# Patient Record
Sex: FEMALE | Race: WHITE | NOT HISPANIC OR LATINO | Employment: FULL TIME | URBAN - METROPOLITAN AREA
[De-identification: names, ages, dates, MRNs, and addresses within clinical notes are randomized per-mention and may not be internally consistent; named-entity substitution may affect disease eponyms.]

---

## 2017-07-14 ENCOUNTER — OFFICE VISIT - HEALTHEAST (OUTPATIENT)
Dept: MIDWIFE SERVICES | Facility: CLINIC | Age: 27
End: 2017-07-14

## 2017-07-14 DIAGNOSIS — Z30.431 IUD SURVEILLANCE: ICD-10-CM

## 2017-07-14 DIAGNOSIS — T83.32XA IUD STRINGS LOST: ICD-10-CM

## 2017-07-14 DIAGNOSIS — Z97.5 IUD (INTRAUTERINE DEVICE) IN PLACE: ICD-10-CM

## 2017-07-14 ASSESSMENT — MIFFLIN-ST. JEOR: SCORE: 1478.12

## 2017-12-17 ENCOUNTER — HEALTH MAINTENANCE LETTER (OUTPATIENT)
Age: 27
End: 2017-12-17

## 2018-05-21 ENCOUNTER — COMMUNICATION - HEALTHEAST (OUTPATIENT)
Dept: MIDWIFE SERVICES | Facility: CLINIC | Age: 28
End: 2018-05-21

## 2018-05-22 ENCOUNTER — AMBULATORY - HEALTHEAST (OUTPATIENT)
Dept: MIDWIFE SERVICES | Facility: CLINIC | Age: 28
End: 2018-05-22

## 2018-05-22 DIAGNOSIS — Z23 IMMUNIZATION DUE: ICD-10-CM

## 2018-06-12 ENCOUNTER — AMBULATORY - HEALTHEAST (OUTPATIENT)
Dept: INTERNAL MEDICINE | Facility: CLINIC | Age: 28
End: 2018-06-12

## 2018-06-13 ENCOUNTER — OFFICE VISIT - HEALTHEAST (OUTPATIENT)
Dept: INTERNAL MEDICINE | Facility: CLINIC | Age: 28
End: 2018-06-13

## 2018-06-13 DIAGNOSIS — Z11.1 SCREENING EXAMINATION FOR PULMONARY TUBERCULOSIS: ICD-10-CM

## 2018-06-13 DIAGNOSIS — Z00.00 ROUTINE GENERAL MEDICAL EXAMINATION AT A HEALTH CARE FACILITY: ICD-10-CM

## 2018-06-13 ASSESSMENT — MIFFLIN-ST. JEOR: SCORE: 1528.02

## 2018-06-15 LAB
QTF INTERPRETATION: NORMAL
QTF MITOGEN - NIL: 7.1 IU/ML
QTF NIL: 0.08 IU/ML
QTF RESULT: NEGATIVE
QTF TB ANTIGEN - NIL: 0.02 IU/ML

## 2018-07-05 ENCOUNTER — COMMUNICATION - HEALTHEAST (OUTPATIENT)
Dept: ADMINISTRATIVE | Facility: CLINIC | Age: 28
End: 2018-07-05

## 2018-07-18 ENCOUNTER — OFFICE VISIT - HEALTHEAST (OUTPATIENT)
Dept: INTERNAL MEDICINE | Facility: CLINIC | Age: 28
End: 2018-07-18

## 2018-07-18 DIAGNOSIS — F41.0 PANIC ATTACK: ICD-10-CM

## 2018-07-18 DIAGNOSIS — F43.23 ADJUSTMENT DISORDER WITH MIXED ANXIETY AND DEPRESSED MOOD: ICD-10-CM

## 2018-07-18 ASSESSMENT — MIFFLIN-ST. JEOR: SCORE: 1518.95

## 2018-08-08 ENCOUNTER — OFFICE VISIT - HEALTHEAST (OUTPATIENT)
Dept: INTERNAL MEDICINE | Facility: CLINIC | Age: 28
End: 2018-08-08

## 2018-08-08 ENCOUNTER — AMBULATORY - HEALTHEAST (OUTPATIENT)
Dept: INTERNAL MEDICINE | Facility: CLINIC | Age: 28
End: 2018-08-08

## 2018-08-08 ENCOUNTER — RECORDS - HEALTHEAST (OUTPATIENT)
Dept: GENERAL RADIOLOGY | Facility: CLINIC | Age: 28
End: 2018-08-08

## 2018-08-08 DIAGNOSIS — F41.9 ANXIETY: ICD-10-CM

## 2018-08-08 DIAGNOSIS — R07.89 CHEST WALL PAIN: ICD-10-CM

## 2018-08-08 DIAGNOSIS — R07.89 OTHER CHEST PAIN: ICD-10-CM

## 2018-08-08 DIAGNOSIS — F41.0 PANIC ATTACKS: ICD-10-CM

## 2018-08-08 ASSESSMENT — MIFFLIN-ST. JEOR: SCORE: 1518.95

## 2018-09-10 ENCOUNTER — OFFICE VISIT - HEALTHEAST (OUTPATIENT)
Dept: MIDWIFE SERVICES | Facility: CLINIC | Age: 28
End: 2018-09-10

## 2018-09-10 DIAGNOSIS — Z30.09 EMERGENCY CONTRACEPTIVE COUNSELING: ICD-10-CM

## 2018-09-10 DIAGNOSIS — Z30.09 ENCOUNTER FOR GENERAL COUNSELING AND ADVICE ON CONTRACEPTIVE MANAGEMENT: ICD-10-CM

## 2018-09-10 DIAGNOSIS — Z30.432 ENCOUNTER FOR IUD REMOVAL: ICD-10-CM

## 2018-09-10 ASSESSMENT — MIFFLIN-ST. JEOR: SCORE: 1519.4

## 2018-10-18 ENCOUNTER — OFFICE VISIT - HEALTHEAST (OUTPATIENT)
Dept: MIDWIFE SERVICES | Facility: CLINIC | Age: 28
End: 2018-10-18

## 2018-10-18 DIAGNOSIS — Z01.419 WELL WOMAN EXAM: ICD-10-CM

## 2018-10-18 DIAGNOSIS — Z88.9 PERSONAL HISTORY OF ALLERGY TO MEDICINAL AGENT: ICD-10-CM

## 2018-10-18 DIAGNOSIS — Z12.4 CERVICAL CANCER SCREENING: ICD-10-CM

## 2018-10-18 ASSESSMENT — MIFFLIN-ST. JEOR: SCORE: 1520.76

## 2018-10-19 LAB
HPV SOURCE: NORMAL
HUMAN PAPILLOMA VIRUS 16 DNA: NEGATIVE
HUMAN PAPILLOMA VIRUS 18 DNA: NEGATIVE
HUMAN PAPILLOMA VIRUS FINAL DIAGNOSIS: NORMAL
HUMAN PAPILLOMA VIRUS OTHER HR: NEGATIVE
SPECIMEN DESCRIPTION: NORMAL

## 2018-10-22 ENCOUNTER — OFFICE VISIT - HEALTHEAST (OUTPATIENT)
Dept: ALLERGY | Facility: CLINIC | Age: 28
End: 2018-10-22

## 2018-10-22 DIAGNOSIS — J30.9 ALLERGIC RHINITIS, UNSPECIFIED SEASONALITY, UNSPECIFIED TRIGGER: ICD-10-CM

## 2018-10-22 DIAGNOSIS — J45.901 ASTHMA EXACERBATION: ICD-10-CM

## 2018-10-22 DIAGNOSIS — J45.21 MILD INTERMITTENT ASTHMA WITH EXACERBATION: ICD-10-CM

## 2018-10-22 ASSESSMENT — MIFFLIN-ST. JEOR: SCORE: 1520.76

## 2018-10-24 LAB
BKR LAB AP ABNORMAL BLEEDING: NO
BKR LAB AP BIRTH CONTROL/HORMONES: NORMAL
BKR LAB AP CERVICAL APPEARANCE: NORMAL
BKR LAB AP GYN ADEQUACY: NORMAL
BKR LAB AP GYN INTERPRETATION: NORMAL
BKR LAB AP GYN OTHER FINDINGS: NORMAL
BKR LAB AP HPV REFLEX: NORMAL
BKR LAB AP LMP: NORMAL
BKR LAB AP PATIENT STATUS: NORMAL
BKR LAB AP PREVIOUS ABNORMAL: NORMAL
BKR LAB AP PREVIOUS NORMAL: NORMAL
HIGH RISK?: NO
PATH REPORT.COMMENTS IMP SPEC: NORMAL
RESULT FLAG (HE HISTORICAL CONVERSION): NORMAL

## 2018-10-25 ENCOUNTER — COMMUNICATION - HEALTHEAST (OUTPATIENT)
Dept: ALLERGY | Facility: CLINIC | Age: 28
End: 2018-10-25

## 2018-10-25 LAB
A ALTERNATA IGE QN: 11.7 KU/L
A FUMIGATUS IGE QN: <0.35 KU/L
C HERBARUM IGE QN: 1.94 KU/L
CAT DANDER IGG QN: 2.57 KU/L
COCKSFOOT IGE QN: <0.35 KU/L
COMMON RAGWEED IGE QN: <0.35 KU/L
COTTONWOOD IGE QN: <0.35 KU/L
D FARINAE IGE QN: <0.35 KU/L
D PTERONYSS IGE QN: <0.35 KU/L
DOG DANDER+EPITH IGE QN: 0.67 KU/L
KENT BLUE GRASS IGE QN: <0.35 KU/L
MAPLE IGE QN: <0.35 KU/L
ROACH IGE QN: <0.35 KU/L
SILVER BIRCH IGE QN: <0.35 KU/L
TIMOTHY IGE QN: <0.35 KU/L
TOTAL IGE - HISTORICAL: 141 KU/L (ref 0–100)
WHITE ASH IGE QN: 0.4 KU/L
WHITE ELM IGE QN: <0.35 KU/L
WHITE OAK IGE QN: <0.35 KU/L

## 2019-01-03 ENCOUNTER — COMMUNICATION - HEALTHEAST (OUTPATIENT)
Dept: SCHEDULING | Facility: CLINIC | Age: 29
End: 2019-01-03

## 2019-01-03 ENCOUNTER — AMBULATORY - HEALTHEAST (OUTPATIENT)
Dept: MIDWIFE SERVICES | Facility: CLINIC | Age: 29
End: 2019-01-03

## 2019-01-03 ENCOUNTER — OFFICE VISIT - HEALTHEAST (OUTPATIENT)
Dept: MIDWIFE SERVICES | Facility: CLINIC | Age: 29
End: 2019-01-03

## 2019-01-03 ENCOUNTER — COMMUNICATION - HEALTHEAST (OUTPATIENT)
Dept: MIDWIFE SERVICES | Facility: CLINIC | Age: 29
End: 2019-01-03

## 2019-01-03 DIAGNOSIS — N92.6 MISSED MENSES: ICD-10-CM

## 2019-01-03 DIAGNOSIS — Z32.00 POSSIBLE PREGNANCY: ICD-10-CM

## 2019-01-03 LAB — HCG UR QL: POSITIVE

## 2019-01-03 ASSESSMENT — MIFFLIN-ST. JEOR: SCORE: 1514.86

## 2019-01-15 ENCOUNTER — COMMUNICATION - HEALTHEAST (OUTPATIENT)
Dept: OBGYN | Facility: CLINIC | Age: 29
End: 2019-01-15

## 2019-03-11 ENCOUNTER — OFFICE VISIT (OUTPATIENT)
Dept: FAMILY MEDICINE | Facility: CLINIC | Age: 29
End: 2019-03-11
Payer: COMMERCIAL

## 2019-03-11 VITALS
OXYGEN SATURATION: 98 % | SYSTOLIC BLOOD PRESSURE: 122 MMHG | BODY MASS INDEX: 24.13 KG/M2 | TEMPERATURE: 98.2 F | DIASTOLIC BLOOD PRESSURE: 81 MMHG | HEIGHT: 68 IN | HEART RATE: 96 BPM | WEIGHT: 159.2 LBS

## 2019-03-11 DIAGNOSIS — B37.31 CANDIDIASIS OF VAGINA: ICD-10-CM

## 2019-03-11 DIAGNOSIS — N76.0 BV (BACTERIAL VAGINOSIS): ICD-10-CM

## 2019-03-11 DIAGNOSIS — Z11.3 SCREEN FOR STD (SEXUALLY TRANSMITTED DISEASE): ICD-10-CM

## 2019-03-11 DIAGNOSIS — Z30.431 IUD CHECK UP: Primary | ICD-10-CM

## 2019-03-11 DIAGNOSIS — B96.89 BV (BACTERIAL VAGINOSIS): ICD-10-CM

## 2019-03-11 DIAGNOSIS — J45.20 MILD INTERMITTENT ASTHMA WITHOUT COMPLICATION: ICD-10-CM

## 2019-03-11 LAB
ALBUMIN UR-MCNC: NEGATIVE MG/DL
APPEARANCE UR: CLEAR
BACTERIA #/AREA URNS HPF: ABNORMAL /HPF
BILIRUB UR QL STRIP: NEGATIVE
COLOR UR AUTO: YELLOW
GLUCOSE UR STRIP-MCNC: NEGATIVE MG/DL
HCG UR QL: NEGATIVE
HGB UR QL STRIP: NEGATIVE
KETONES UR STRIP-MCNC: NEGATIVE MG/DL
LEUKOCYTE ESTERASE UR QL STRIP: NEGATIVE
NITRATE UR QL: NEGATIVE
PH UR STRIP: 6.5 PH (ref 5–7)
RBC #/AREA URNS AUTO: ABNORMAL /HPF
SOURCE: ABNORMAL
SP GR UR STRIP: <=1.005 (ref 1–1.03)
SPECIMEN SOURCE: ABNORMAL
UROBILINOGEN UR STRIP-ACNC: 0.2 EU/DL (ref 0.2–1)
WBC #/AREA URNS AUTO: ABNORMAL /HPF
WET PREP SPEC: ABNORMAL

## 2019-03-11 PROCEDURE — 87491 CHLMYD TRACH DNA AMP PROBE: CPT | Performed by: NURSE PRACTITIONER

## 2019-03-11 PROCEDURE — 87591 N.GONORRHOEAE DNA AMP PROB: CPT | Performed by: NURSE PRACTITIONER

## 2019-03-11 PROCEDURE — 81025 URINE PREGNANCY TEST: CPT | Performed by: NURSE PRACTITIONER

## 2019-03-11 PROCEDURE — 81001 URINALYSIS AUTO W/SCOPE: CPT | Performed by: NURSE PRACTITIONER

## 2019-03-11 PROCEDURE — 99213 OFFICE O/P EST LOW 20 MIN: CPT | Performed by: NURSE PRACTITIONER

## 2019-03-11 PROCEDURE — 87210 SMEAR WET MOUNT SALINE/INK: CPT | Performed by: NURSE PRACTITIONER

## 2019-03-11 RX ORDER — METRONIDAZOLE 500 MG/1
500 TABLET ORAL 2 TIMES DAILY
Qty: 14 TABLET | Refills: 0 | Status: SHIPPED | OUTPATIENT
Start: 2019-03-11 | End: 2019-03-18

## 2019-03-11 RX ORDER — FLUCONAZOLE 150 MG/1
150 TABLET ORAL ONCE
Qty: 1 TABLET | Refills: 0 | Status: SHIPPED | OUTPATIENT
Start: 2019-03-11 | End: 2019-03-11

## 2019-03-11 RX ORDER — PRENATAL VIT/IRON FUM/FOLIC AC 27MG-0.8MG
1 TABLET ORAL DAILY
COMMUNITY
End: 2024-06-20

## 2019-03-11 ASSESSMENT — MIFFLIN-ST. JEOR: SCORE: 1494.6

## 2019-03-11 NOTE — PROGRESS NOTES
SUBJECTIVE:   Margarita Roper is a 28 year old female who presents to clinic today for the following health issues:      Would like to get blood drawn to check for potassium levels.    {additional problems for provider to add:341107}    Problem list and histories reviewed & adjusted, as indicated.  Additional history: as documented    {HIST REVIEW/ LINKS 2:024882}    Reviewed and updated as needed this visit by clinical staff  Tobacco  Allergies  Meds  Med Hx  Surg Hx  Fam Hx  Soc Hx      Reviewed and updated as needed this visit by Provider         {PROVIDER CHARTING PREFERENCE:364134}

## 2019-03-11 NOTE — LETTER
My Asthma Action Plan  Name: Margarita Roper   YOB: 1990  Date: 3/11/2019   My doctor: GAURAV Stone CNP   My clinic: [facility]        My Control Medicine: None  My Rescue Medicine: Albuterol (Proair/Ventolin/Proventil) inhaler inhale 2 puffs every 4 ours as needed for cough, sob, wheezing   My Asthma Severity: intermittent  Avoid your asthma triggers: .               GREEN ZONE   Good Control    I feel good    No cough or wheeze    Can work, sleep and play without asthma symptoms       Take your asthma control medicine every day.     1. If exercise triggers your asthma, take your rescue medication    15 minutes before exercise or sports, and    During exercise if you have asthma symptoms  2. Spacer to use with inhaler: If you have a spacer, make sure to use it with your inhaler             YELLOW ZONE Getting Worse  I have ANY of these:    I do not feel good    Cough or wheeze    Chest feels tight    Wake up at night   1. Keep taking your Green Zone medications  2. Start taking your rescue medicine:    every 20 minutes for up to 1 hour. Then every 4 hours for 24-48 hours.  3. If you stay in the Yellow Zone for more than 12-24 hours, contact your doctor.  4. If you do not return to the Green Zone in 12-24 hours or you get worse, start taking your oral steroid medicine if prescribed by your provider.           RED ZONE Medical Alert - Get Help  I have ANY of these:    I feel awful    Medicine is not helping    Breathing getting harder    Trouble walking or talking    Nose opens wide to breathe       1. Take your rescue medicine NOW  2. If your provider has prescribed an oral steroid medicine, start taking it NOW  3. Call your doctor NOW  4. If you are still in the Red Zone after 20 minutes and you have not reached your doctor:    Take your rescue medicine again and    Call 911 or go to the emergency room right away    See your regular doctor within 2 weeks of an  Emergency Room or Urgent Care visit for follow-up treatment.          Annual Reminders:  Meet with Asthma Educator,  Flu Shot in the Fall, consider Pneumonia Vaccination for patients with asthma (aged 19 and older).    Pharmacy:    [facility] [place] [facility] [address]  [id number] - [place], [place] - [address] [facility] [address] [place], [place] [address] [facility] [address]  [facility][id number] - [facility], [place] - [address] [id number] - [facility], [place] - [address]                      Asthma Triggers  How To Control Things That Make Your Asthma Worse    Triggers are things that make your asthma worse.  Look at the list below to help you find your triggers and what you can do about them.  You can help prevent asthma flare-ups by staying away from your triggers.      Trigger                                                          What you can do   Cigarette Smoke  Tobacco smoke can make asthma worse. Do not allow smoking in your home, car or around you.  Be sure no one smokes at a child s day care or school.  If you smoke, ask your health care provider for ways to help you quit.  Ask family members to quit too.  Ask your health care provider for a referral to Quit Plan to help you quit smoking, or call 0-089-530-PLAN.     Colds, Flu, Bronchitis  These are common triggers of asthma. Wash your hands often.  Don t touch your eyes, nose or mouth.  Get a flu shot every year.     Dust Mites  These are tiny bugs that live in cloth or carpet. They are too small to see. Wash sheets and blankets in hot water every week.   Encase pillows and mattress in dust mite proof covers.  Avoid having carpet if you can. If you have carpet, vacuum weekly.   Use a dust mask and HEPA vacuum.   Pollen and Outdoor Mold  Some people are allergic to trees, grass, or weed pollen, or molds. Try to  keep your windows closed.  Limit time out doors when pollen count is high.   Ask you health care provider about taking medicine during allergy season.     Animal Dander  Some people are allergic to skin flakes, urine or saliva from pets with fur or feathers. Keep pets with fur or feathers out of your home.    If you can t keep the pet outdoors, then keep the pet out of your bedroom.  Keep the bedroom door closed.  Keep pets off cloth furniture and away from stuffed toys.     Mice, Rats, and Cockroaches  Some people are allergic to the waste from these pests.   Cover food and garbage.  Clean up spills and food crumbs.  Store grease in the refrigerator.   Keep food out of the bedroom.   Indoor Mold  This can be a trigger if your home has high moisture. Fix leaking faucets, pipes, or other sources of water.   Clean moldy surfaces.  Dehumidify basement if it is damp and smelly.   Smoke, Strong Odors, and Sprays  These can reduce air quality. Stay away from strong odors and sprays, such as perfume, powder, hair spray, paints, smoke incense, paint, cleaning products, candles and new carpet.   Exercise or Sports  Some people with asthma have this trigger. Be active!  Ask your doctor about taking medicine before sports or exercise to prevent symptoms.    Warm up for 5-10 minutes before and after sports or exercise.     Other Triggers of Asthma  Cold air:  Cover your nose and mouth with a scarf.  Sometimes laughing or crying can be a trigger.  Some medicines and food can trigger asthma.

## 2019-03-11 NOTE — PATIENT INSTRUCTIONS
At [facility], we strive to deliver an exceptional experience to you, every time we see you.  If you receive a survey in the mail, please send us back your thoughts. We really do value your feedback.    Your care team:                            Family Medicine Internal Medicine   MD Doc Cabral MD Shantel Branch-Fleming, MD Katya Georgiev PA-C Megan Hill, APRN CNP    Wally Davis MD Pediatrics   Vinay Stevenson, HANSA Latif, MD Monse Membreno APRN CNP   MD Rosa Elena Steele MD Deborah Mielke, MD Michell Garcia, APRN [facility]      Clinic hours: Monday - Thursday 7 am-7 pm; Fridays 7 am-5 pm.   Urgent care: Monday - Friday 11 am-9 pm; Saturday and Sunday 9 am-5 pm.  Pharmacy : Monday -Thursday 8 am-8 pm; Friday 8 am-6 pm; Saturday and Sunday 9 am-5 pm.     Clinic: [phone]   Pharmacy: [phone]        Patient Education     Levonorgestrel intrauterine device (IUD)  Brand Names: Kyleena, LILETTA, Mirena, Sindhu  What is this medicine?  LEVONORGESTREL IUD (GRETEL voe nor shannon griffinl) is a contraceptive (birth control) device. The device is placed inside the uterus by a healthcare professional. It is used to prevent pregnancy. This device can also be used to treat heavy bleeding that occurs during your period.  How should I use this medicine?  This device is placed inside the uterus by a health care professional.  Talk to your pediatrician regarding the use of this medicine in children. Special care may be needed.  What side effects may I notice from receiving this medicine?  Side effects that you should report to your doctor or health care professional as soon as possible:    allergic reactions like skin rash, itching or hives, swelling of the face, lips, or tongue    fever, flu-like symptoms    genital sores    high blood pressure    no menstrual period for 6 weeks during use    pain, swelling, warmth in the leg    pelvic pain or  tenderness    severe or sudden headache    signs of pregnancy    stomach cramping    sudden shortness of breath    trouble with balance, talking, or walking    unusual vaginal bleeding, discharge    yellowing of the eyes or skin  Side effects that usually do not require medical attention (report to your doctor or health care professional if they continue or are bothersome):    acne    breast pain    change in sex drive or performance    changes in weight    cramping, dizziness, or faintness while the device is being inserted    headache    irregular menstrual bleeding within first 3 to 6 months of use    nausea  What may interact with this medicine?  Do not take this medicine with any of the following medications:    amprenavir    bosentan    fosamprenavir  This medicine may also interact with the following medications:    aprepitant    armodafinil    barbiturate medicines for inducing sleep or treating seizures    bexarotene    boceprevir    griseofulvin    medicines to treat seizures like carbamazepine, ethotoin, felbamate, oxcarbazepine, phenytoin, topiramate    modafinil    pioglitazone    rifabutin    rifampin    rifapentine    some medicines to treat HIV infection like atazanavir, efavirenz, indinavir, lopinavir, nelfinavir, tipranavir, ritonavir    Clay's wort    warfarin  What if I miss a dose?  This does not apply. Depending on the brand of device you have inserted, the device will need to be replaced every 3 to 5 years if you wish to continue using this type of birth control.  Where should I keep my medicine?  This does not apply.  What should I tell my health care provider before I take this medicine?  They need to know if you have any of these conditions:    abnormal Pap smear    cancer of the breast, uterus, or cervix    diabetes    endometritis    genital or pelvic infection now or in the past    have more than one sexual partner or your partner has more than one partner    heart disease    history  of an ectopic or tubal pregnancy    immune system problems    IUD in place    liver disease or tumor    problems with blood clots or take blood-thinners    seizures    use intravenous drugs    uterus of unusual shape    vaginal bleeding that has not been explained    an unusual or allergic reaction to levonorgestrel, other hormones, silicone, or polyethylene, medicines, foods, dyes, or preservatives    pregnant or trying to get pregnant    breast-feeding  What should I watch for while using this medicine?  Visit your doctor or health care professional for regular check ups. See your doctor if you or your partner has sexual contact with others, becomes HIV positive, or gets a sexual transmitted disease.  This product does not protect you against HIV infection (AIDS) or other sexually transmitted diseases.  You can check the placement of the IUD yourself by reaching up to the top of your vagina with clean fingers to feel the threads. Do not pull on the threads. It is a good habit to check placement after each menstrual period. Call your doctor right away if you feel more of the IUD than just the threads or if you cannot feel the threads at all.  The IUD may come out by itself. You may become pregnant if the device comes out. If you notice that the IUD has come out use a backup birth control method like condoms and call your health care provider.  Using tampons will not change the position of the IUD and are okay to use during your period.  This IUD can be safely scanned with magnetic resonance imaging (MRI) only under specific conditions. Before you have an MRI, tell your healthcare provider that you have an IUD in place, and which type of IUD you have in place.  NOTE:This sheet is a summary. It may not cover all possible information. If you have questions about this medicine, talk to your doctor, pharmacist, or health care provider. Copyright  2018 [employer]

## 2019-03-11 NOTE — PROGRESS NOTES
SUBJECTIVE:   Margarita Roper is a 28 year old female who presents to clinic today for the following health issues:      Patient is here for a gyn exam, to have provider check for her IUD string and a pregnancy test. She is unable to feel the string. Kyleena  IUD placed 19.  She is , s/p EAB 19.  She has had some light spotting since Kyleena was placed but no menses.  She was expecting to have a menstrual cycle with Kyleena. She requests STD testing due to new partner. No concerns for STI- no abnormal vaginal discharge, itching, no urinary frequency, urgency, dysuria, no abdominal, flank, or back pain.    Due for ACT- asthma is well controlled, ACT=25 today.      Problem list and histories reviewed & adjusted, as indicated.  Additional history: as documented    Patient Active Problem List   Diagnosis     CARDIOVASCULAR SCREENING; LDL GOAL LESS THAN 160     Cervical high risk HPV (human papillomavirus) test positive     Allergic rhinitis     Intermittent asthma     Tobacco use disorder     BMI 25.0-25.9,adult     IUD check up     Past Surgical History:   Procedure Laterality Date     EXTRACTION(S) DENTAL         Social History     Tobacco Use     Smoking status: Current Some Day Smoker     Packs/day: 0.30     Years: 6.00     Pack years: 1.80     Types: Cigarettes     Smokeless tobacco: Never Used     Tobacco comment: 1-2 cigarettes per week   Substance Use Topics     Alcohol use: Yes     Comment: social     Family History   Problem Relation Age of Onset     Hypertension Father      C.A.D. Paternal Grandfather         MI     Heart Disease Paternal Grandfather      Unknown/Adopted Maternal Grandfather      Diabetes No family hx of      Breast Cancer No family hx of      Cancer - colorectal No family hx of          Current Outpatient Medications   Medication Sig Dispense Refill     albuterol (PROAIR HFA, PROVENTIL HFA, VENTOLIN HFA) 108 (90 BASE) MCG/ACT inhaler Inhale 2 puffs into the lungs every 6  "hours as needed for shortness of breath / dyspnea or wheezing 3 Inhaler 1     Ascorbic Acid (VITAMIN C PO)        Prenatal Vit-Fe Fumarate-FA (PRENATAL MULTIVITAMIN W/IRON) 27-0.8 MG tablet Take 1 tablet by mouth daily       IRON PO Take 1 tablet by mouth       mefenamic acid (PONSTEL) 250 MG CAPS Take 2 capsules (500 mg) by mouth every 6 hours as needed for moderate pain (Patient not taking: Reported on 3/11/2019) 50 each 3     Omega-3 Fatty Acids (OMEGA-3 FISH OIL PO)        vitamin  B complex with vitamin C (VITAMIN  B COMPLEX) TABS Take 1 tablet by mouth daily       VITAMIN D, CHOLECALCIFEROL, PO Take by mouth daily       VITAMIN E COMPLEX PO        BP Readings from Last 3 Encounters:   03/11/19 122/81   10/10/14 107/69   06/04/13 116/72    Wt Readings from Last 3 Encounters:   03/11/19 72.2 kg (159 lb 3.2 oz)   10/10/14 69.3 kg (152 lb 12.8 oz)   06/04/13 69.4 kg (153 lb)                    Reviewed and updated as needed this visit by clinical staff  Tobacco  Allergies  Meds  Problems  Med Hx  Surg Hx  Fam Hx  Soc Hx        Reviewed and updated as needed this visit by Provider  Tobacco  Allergies  Meds  Problems  Med Hx  Surg Hx  Fam Hx         ROS:  Constitutional, HEENT, cardiovascular, pulmonary, gi and gu systems are negative, except as otherwise noted.    OBJECTIVE:     /81   Pulse 96   Temp 98.2  F (36.8  C) (Oral)   Ht 1.734 m (5' 8.25\")   Wt 72.2 kg (159 lb 3.2 oz)   SpO2 98%   Breastfeeding? Unknown   BMI 24.03 kg/m    Body mass index is 24.03 kg/m .  GENERAL: healthy, alert and no distress  NECK: no adenopathy, no asymmetry, masses, or scars and thyroid normal to palpation  RESP: lungs clear to auscultation - no rales, rhonchi or wheezes  CV: regular rate and rhythm, normal S1 S2, no S3 or S4, no murmur, click or rub, no peripheral edema and peripheral pulses strong  ABDOMEN: soft, nontender, no hepatosplenomegaly, no masses and bowel sounds normal   (female): normal " "female external genitalia, normal urethral meatus, vaginal mucosa, normal cervix/adnexa/uterus without masses, wet prep,and GC obtained, no discharge  MS: no gross musculoskeletal defects noted, no edema  SKIN: no suspicious lesions or rashes  NEURO: Normal strength and tone, mentation intact and speech normal  BACK: no CVA tenderness, no paralumbar tenderness  LYMPH: normal ant/post cervical, supraclavicular nodes    Diagnostic Test Results:  Results for orders placed or performed in visit on 03/11/19 (from the past 24 hour(s))   HCG qualitative urine - CSC and Range   Result Value Ref Range    HCG Qual Urine Negative NEG^Negative   UA with Microscopic reflex to Culture   Result Value Ref Range    Color Urine Yellow     Appearance Urine Clear     Glucose Urine Negative NEG^Negative mg/dL    Bilirubin Urine Negative NEG^Negative    Ketones Urine Negative NEG^Negative mg/dL    Specific Gravity Urine <=1.005 1.003 - 1.035    pH Urine 6.5 5.0 - 7.0 pH    Protein Albumin Urine Negative NEG^Negative mg/dL    Urobilinogen Urine 0.2 0.2 - 1.0 EU/dL    Nitrite Urine Negative NEG^Negative    Blood Urine Negative NEG^Negative    Leukocyte Esterase Urine Negative NEG^Negative    Source Midstream Urine     WBC Urine 0 - 5 OTO5^0 - 5 /HPF    RBC Urine O - 2 OTO2^O - 2 /HPF    Bacteria Urine Few (A) NEG^Negative /HPF   Wet prep   Result Value Ref Range    Specimen Description Vagina     Wet Prep No Trichomonas seen     Wet Prep Clue cells seen (A)     Wet Prep Yeast seen (A)        ASSESSMENT/PLAN:         BP Screening:   Last 3 BP Readings:    BP Readings from Last 3 Encounters:   03/11/19 122/81   10/10/14 107/69   06/04/13 116/72       The following was recommended to the patient:  Re-screen BP within a year and recommended lifestyle modifications  BMI:   Estimated body mass index is 24.03 kg/m  as calculated from the following:    Height as of this encounter: 1.734 m (5' 8.25\").    Weight as of this encounter: 72.2 kg (159 lb " 3.2 oz).         1. IUD check up  IUD string visible in os.  - HCG qualitative urine - CSC and Range    2. Screen for STD (sexually transmitted disease)  Safer sex practices reviewed.  - Hepatic panel  - HIV Antigen Antibody Combo  - UA with Microscopic reflex to Culture  - Wet prep  - Chlamydia trachomatis PCR  - Neisseria gonorrhoeae PCR  - Hepatitis B surface antigen  - Treponema Abs w Reflex to RPR and Titer  - **Hepatitis C Screen Reflex to RNA FUTURE anytime; Future  - **Hepatitis C Screen Reflex to RNA FUTURE anytime    3. Mild intermittent asthma without complication  ACT=25.  Well controlled.      See Patient Instructions    GAURAV Stone CNP  [facility]

## 2019-03-12 LAB
C TRACH DNA SPEC QL NAA+PROBE: NEGATIVE
N GONORRHOEA DNA SPEC QL NAA+PROBE: NEGATIVE
SPECIMEN SOURCE: NORMAL
SPECIMEN SOURCE: NORMAL

## 2019-03-12 ASSESSMENT — ASTHMA QUESTIONNAIRES: ACT_TOTALSCORE: 25

## 2019-09-24 ENCOUNTER — OFFICE VISIT (OUTPATIENT)
Dept: FAMILY MEDICINE | Facility: CLINIC | Age: 29
End: 2019-09-24
Payer: COMMERCIAL

## 2019-09-24 VITALS
TEMPERATURE: 98.5 F | SYSTOLIC BLOOD PRESSURE: 115 MMHG | HEART RATE: 50 BPM | WEIGHT: 163 LBS | DIASTOLIC BLOOD PRESSURE: 66 MMHG | HEIGHT: 68 IN | RESPIRATION RATE: 16 BRPM | BODY MASS INDEX: 24.71 KG/M2 | OXYGEN SATURATION: 99 %

## 2019-09-24 DIAGNOSIS — Z11.3 SCREEN FOR STD (SEXUALLY TRANSMITTED DISEASE): Primary | ICD-10-CM

## 2019-09-24 DIAGNOSIS — N76.0 BV (BACTERIAL VAGINOSIS): ICD-10-CM

## 2019-09-24 DIAGNOSIS — B96.89 BV (BACTERIAL VAGINOSIS): ICD-10-CM

## 2019-09-24 LAB
SPECIMEN SOURCE: ABNORMAL
WET PREP SPEC: ABNORMAL

## 2019-09-24 PROCEDURE — 86780 TREPONEMA PALLIDUM: CPT | Performed by: PHYSICIAN ASSISTANT

## 2019-09-24 PROCEDURE — 36415 COLL VENOUS BLD VENIPUNCTURE: CPT | Performed by: PHYSICIAN ASSISTANT

## 2019-09-24 PROCEDURE — 87210 SMEAR WET MOUNT SALINE/INK: CPT | Performed by: PHYSICIAN ASSISTANT

## 2019-09-24 PROCEDURE — 87491 CHLMYD TRACH DNA AMP PROBE: CPT | Performed by: PHYSICIAN ASSISTANT

## 2019-09-24 PROCEDURE — 87389 HIV-1 AG W/HIV-1&-2 AB AG IA: CPT | Performed by: PHYSICIAN ASSISTANT

## 2019-09-24 PROCEDURE — 87591 N.GONORRHOEAE DNA AMP PROB: CPT | Performed by: PHYSICIAN ASSISTANT

## 2019-09-24 PROCEDURE — 99213 OFFICE O/P EST LOW 20 MIN: CPT | Performed by: PHYSICIAN ASSISTANT

## 2019-09-24 ASSESSMENT — ANXIETY QUESTIONNAIRES
1. FEELING NERVOUS, ANXIOUS, OR ON EDGE: NOT AT ALL
5. BEING SO RESTLESS THAT IT IS HARD TO SIT STILL: NOT AT ALL
IF YOU CHECKED OFF ANY PROBLEMS ON THIS QUESTIONNAIRE, HOW DIFFICULT HAVE THESE PROBLEMS MADE IT FOR YOU TO DO YOUR WORK, TAKE CARE OF THINGS AT HOME, OR GET ALONG WITH OTHER PEOPLE: NOT DIFFICULT AT ALL
GAD7 TOTAL SCORE: 0
6. BECOMING EASILY ANNOYED OR IRRITABLE: NOT AT ALL
2. NOT BEING ABLE TO STOP OR CONTROL WORRYING: NOT AT ALL
7. FEELING AFRAID AS IF SOMETHING AWFUL MIGHT HAPPEN: NOT AT ALL
3. WORRYING TOO MUCH ABOUT DIFFERENT THINGS: NOT AT ALL

## 2019-09-24 ASSESSMENT — MIFFLIN-ST. JEOR: SCORE: 1506.83

## 2019-09-24 ASSESSMENT — PATIENT HEALTH QUESTIONNAIRE - PHQ9
SUM OF ALL RESPONSES TO PHQ QUESTIONS 1-9: 0
5. POOR APPETITE OR OVEREATING: NOT AT ALL

## 2019-09-24 ASSESSMENT — PAIN SCALES - GENERAL: PAINLEVEL: NO PAIN (0)

## 2019-09-24 NOTE — PATIENT INSTRUCTIONS
At [facility], we strive to deliver an exceptional experience to you, every time we see you.  If you receive a survey in the mail, please send us back your thoughts. We really do value your feedback.    Based on your medical history, these are the current health maintenance/preventive care services that you are due for (some may have been done at this visit.)  Health Maintenance Due   Topic Date Due     PNEUMOCOCCAL IMMUNIZATION 19-64 MEDIUM RISK (1 of 1 - PPSV23) 05/26/2007     PAP FOLLOW-UP  05/26/2009     PREVENTIVE CARE VISIT  10/10/2015     PHQ-2  01/01/2019     INFLUENZA VACCINE (1) 09/01/2019     ASTHMA CONTROL TEST  09/11/2019     HPV  10/10/2019         Suggested websites for health information:  Www.[employer].org : Up to date and easily searchable information on multiple topics.  Www.[employer].gov : medication info, interactive tutorials, watch real surgeries online  Www.familydoctor.org : good info from the Academy of Family Physicians  Www.cdc.gov : public health info, travel advisories, epidemics (H1N1)  Www.aap.org : children's health info, normal development, vaccinations  Www.health.[facility].[place].us : [place] dept of health, public health issues in [place], N1N1    Your care team:                            Family Medicine Internal Medicine   MD Doc Cabral MD Shantel Branch-Fleming, MD Katya Georgiev PA-C Nam Ho, MD Pediatrics   HANSA Gonzalez, MD Rosa Elena Jason CNP, MD Deborah Mielke, MD Kim Thein, APRN [facility]      Clinic hours: Monday - Thursday 7 am-7 pm; Fridays 7 am-5 pm.   Urgent care: Monday - Friday 11 am-9 pm; Saturday and Sunday 9 am-5 pm.  Pharmacy : Monday -Thursday 8 am-8 pm; Friday 8 am-6 pm; Saturday and Sunday 9 am-5 pm.     Clinic: [phone]   Pharmacy: [phone]

## 2019-09-24 NOTE — PROGRESS NOTES
Subjective     Margarita Roper is a 29 year old female who presents to clinic today for the following health issues:    HPI   STD Tests screen. Patient denies symptom, she has a new partner 3 weeks ago.      Patient Active Problem List   Diagnosis     CARDIOVASCULAR SCREENING; LDL GOAL LESS THAN 160     Cervical high risk HPV (human papillomavirus) test positive     Allergic rhinitis     Intermittent asthma     Tobacco use disorder     BMI 25.0-25.9,adult     IUD check up     Past Surgical History:   Procedure Laterality Date     EXTRACTION(S) DENTAL         Social History     Tobacco Use     Smoking status: Former Smoker     Packs/day: 0.30     Years: 6.00     Pack years: 1.80     Types: Cigarettes     Smokeless tobacco: Never Used     Tobacco comment: 1-2 cigarettes per week   Substance Use Topics     Alcohol use: Yes     Comment: social     Family History   Problem Relation Age of Onset     Hypertension Father      C.A.D. Paternal Grandfather         MI     Heart Disease Paternal Grandfather      Unknown/Adopted Maternal Grandfather      Diabetes No family hx of      Breast Cancer No family hx of      Cancer - colorectal No family hx of          Current Outpatient Medications   Medication Sig Dispense Refill     Prenatal Vit-Fe Fumarate-FA (PRENATAL MULTIVITAMIN W/IRON) 27-0.8 MG tablet Take 1 tablet by mouth daily       albuterol (PROAIR HFA, PROVENTIL HFA, VENTOLIN HFA) 108 (90 BASE) MCG/ACT inhaler Inhale 2 puffs into the lungs every 6 hours as needed for shortness of breath / dyspnea or wheezing (Patient not taking: Reported on 9/24/2019) 3 Inhaler 1     Ascorbic Acid (VITAMIN C PO)        IRON PO Take 1 tablet by mouth       mefenamic acid (PONSTEL) 250 MG CAPS Take 2 capsules (500 mg) by mouth every 6 hours as needed for moderate pain (Patient not taking: Reported on 3/11/2019) 50 each 3     Omega-3 Fatty Acids (OMEGA-3 FISH OIL PO)        vitamin  B complex with vitamin C (VITAMIN  B COMPLEX) TABS Take 1  "tablet by mouth daily       VITAMIN D, CHOLECALCIFEROL, PO Take by mouth daily       VITAMIN E COMPLEX PO        Allergies   Allergen Reactions     Penicillins Rash and Hives     Ampicillin Sodium      Corn Syrup [Glucose]      No Clinical Screening - See Comments      Allergic to corn     Gluten Meal Other (See Comments)     Allergic to gluten  \"Stomach blows up\", pain, constipation     Lactalbumin Other (See Comments)     Bloated, constipated     Reviewed and updated as needed this visit by Provider  Tobacco  Allergies  Meds  Problems  Med Hx  Surg Hx  Fam Hx         Review of Systems   ROS COMP: Constitutional, HEENT, cardiovascular, pulmonary, gi and gu systems are negative, except as otherwise noted.      Objective    /66 (BP Location: Left arm, Patient Position: Chair, Cuff Size: Adult Regular)   Pulse 50   Temp 98.5  F (36.9  C) (Oral)   Resp 16   Ht 1.734 m (5' 8.25\")   Wt 73.9 kg (163 lb)   LMP  (LMP Unknown)   SpO2 99%   Breastfeeding? No   BMI 24.60 kg/m    Body mass index is 24.6 kg/m .  Physical Exam   GENERAL: healthy, alert and no distress  EYES: Eyes grossly normal to inspection,  conjunctivae and sclerae normal  HENT: normal cephalic/atraumatic, face is symmetrical,   RESP: no difficulty breathing, no use of accessory muscles observed.   CV: no peripheral edema and color normal, no parasternal heaves visualized.   MS: no gross musculoskeletal defects noted, no edema  SKIN: no suspicious lesions or rashes  NEURO: Normal strength and tone, mentation intact and speech normal  PSYCH: mentation appears normal, affect normal/bright      Diagnostic Test Results:  Labs reviewed in Epic        Assessment & Plan       ICD-10-CM    1. Screen for STD (sexually transmitted disease) Z11.3 HIV Antigen Antibody Combo     Treponema Abs w Reflex to RPR and Titer     NEISSERIA GONORRHOEA PCR     CHLAMYDIA TRACHOMATIS PCR     Wet prep      STDs are pending.   Patient wants to have HIV done today, " advised to follow up in 3-4 month to repeat HIV test is she is concerned about new partner status.     Return in about 3 months (around 12/24/2019).    Helena Mojica PA-C  [facility]

## 2019-09-25 LAB
C TRACH DNA SPEC QL NAA+PROBE: NEGATIVE
HIV 1+2 AB+HIV1 P24 AG SERPL QL IA: NONREACTIVE
N GONORRHOEA DNA SPEC QL NAA+PROBE: NEGATIVE
SPECIMEN SOURCE: NORMAL
SPECIMEN SOURCE: NORMAL
T PALLIDUM AB SER QL: NONREACTIVE

## 2019-09-25 ASSESSMENT — ANXIETY QUESTIONNAIRES: GAD7 TOTAL SCORE: 0

## 2019-09-26 RX ORDER — METRONIDAZOLE 500 MG/1
500 TABLET ORAL 2 TIMES DAILY
Qty: 14 TABLET | Refills: 0 | Status: SHIPPED | OUTPATIENT
Start: 2019-09-26 | End: 2019-11-29

## 2019-10-02 ENCOUNTER — COMMUNICATION - HEALTHEAST (OUTPATIENT)
Dept: MIDWIFE SERVICES | Facility: CLINIC | Age: 29
End: 2019-10-02

## 2019-11-06 ENCOUNTER — HEALTH MAINTENANCE LETTER (OUTPATIENT)
Age: 29
End: 2019-11-06

## 2019-11-29 ENCOUNTER — OFFICE VISIT (OUTPATIENT)
Dept: FAMILY MEDICINE | Facility: CLINIC | Age: 29
End: 2019-11-29
Payer: COMMERCIAL

## 2019-11-29 VITALS
HEART RATE: 69 BPM | OXYGEN SATURATION: 97 % | WEIGHT: 167 LBS | DIASTOLIC BLOOD PRESSURE: 63 MMHG | TEMPERATURE: 98 F | BODY MASS INDEX: 25.31 KG/M2 | RESPIRATION RATE: 16 BRPM | HEIGHT: 68 IN | SYSTOLIC BLOOD PRESSURE: 97 MMHG

## 2019-11-29 DIAGNOSIS — Z87.891 FORMER SMOKER: ICD-10-CM

## 2019-11-29 DIAGNOSIS — N76.0 BV (BACTERIAL VAGINOSIS): ICD-10-CM

## 2019-11-29 DIAGNOSIS — Z30.432 ENCOUNTER FOR IUD REMOVAL: Primary | ICD-10-CM

## 2019-11-29 DIAGNOSIS — Z28.21 INFLUENZA VACCINATION DECLINED BY PATIENT: ICD-10-CM

## 2019-11-29 DIAGNOSIS — Z30.09 ENCOUNTER FOR OTHER GENERAL COUNSELING OR ADVICE ON CONTRACEPTION: ICD-10-CM

## 2019-11-29 DIAGNOSIS — J45.20 MILD INTERMITTENT ASTHMA WITHOUT COMPLICATION: ICD-10-CM

## 2019-11-29 DIAGNOSIS — B96.89 BV (BACTERIAL VAGINOSIS): ICD-10-CM

## 2019-11-29 DIAGNOSIS — Z11.3 SCREEN FOR STD (SEXUALLY TRANSMITTED DISEASE): ICD-10-CM

## 2019-11-29 LAB
ALBUMIN SERPL-MCNC: 4.2 G/DL (ref 3.4–5)
ALBUMIN UR-MCNC: NEGATIVE MG/DL
ALP SERPL-CCNC: 73 U/L (ref 40–150)
ALT SERPL W P-5'-P-CCNC: 28 U/L (ref 0–50)
APPEARANCE UR: CLEAR
AST SERPL W P-5'-P-CCNC: 16 U/L (ref 0–45)
BILIRUB DIRECT SERPL-MCNC: 0.2 MG/DL (ref 0–0.2)
BILIRUB SERPL-MCNC: 0.8 MG/DL (ref 0.2–1.3)
BILIRUB UR QL STRIP: NEGATIVE
COLOR UR AUTO: YELLOW
GLUCOSE UR STRIP-MCNC: NEGATIVE MG/DL
HBV SURFACE AG SERPL QL IA: NONREACTIVE
HGB UR QL STRIP: NEGATIVE
HIV 1+2 AB+HIV1 P24 AG SERPL QL IA: NONREACTIVE
KETONES UR STRIP-MCNC: NEGATIVE MG/DL
LEUKOCYTE ESTERASE UR QL STRIP: NEGATIVE
NITRATE UR QL: NEGATIVE
PH UR STRIP: 7.5 PH (ref 5–7)
PROT SERPL-MCNC: 7.6 G/DL (ref 6.8–8.8)
RBC #/AREA URNS AUTO: ABNORMAL /HPF
SOURCE: ABNORMAL
SP GR UR STRIP: <=1.005 (ref 1–1.03)
SPECIMEN SOURCE: ABNORMAL
UROBILINOGEN UR STRIP-ACNC: 0.2 EU/DL (ref 0.2–1)
WBC #/AREA URNS AUTO: ABNORMAL /HPF
WET PREP SPEC: ABNORMAL

## 2019-11-29 PROCEDURE — 36415 COLL VENOUS BLD VENIPUNCTURE: CPT | Performed by: NURSE PRACTITIONER

## 2019-11-29 PROCEDURE — 87210 SMEAR WET MOUNT SALINE/INK: CPT | Performed by: NURSE PRACTITIONER

## 2019-11-29 PROCEDURE — 81001 URINALYSIS AUTO W/SCOPE: CPT | Performed by: NURSE PRACTITIONER

## 2019-11-29 PROCEDURE — 58301 REMOVE INTRAUTERINE DEVICE: CPT | Performed by: NURSE PRACTITIONER

## 2019-11-29 PROCEDURE — 80076 HEPATIC FUNCTION PANEL: CPT | Performed by: NURSE PRACTITIONER

## 2019-11-29 PROCEDURE — 99213 OFFICE O/P EST LOW 20 MIN: CPT | Mod: 25 | Performed by: NURSE PRACTITIONER

## 2019-11-29 PROCEDURE — 87591 N.GONORRHOEAE DNA AMP PROB: CPT | Performed by: NURSE PRACTITIONER

## 2019-11-29 PROCEDURE — 87491 CHLMYD TRACH DNA AMP PROBE: CPT | Performed by: NURSE PRACTITIONER

## 2019-11-29 PROCEDURE — 87389 HIV-1 AG W/HIV-1&-2 AB AG IA: CPT | Performed by: NURSE PRACTITIONER

## 2019-11-29 PROCEDURE — 87340 HEPATITIS B SURFACE AG IA: CPT | Performed by: NURSE PRACTITIONER

## 2019-11-29 RX ORDER — METRONIDAZOLE 500 MG/1
500 TABLET ORAL 2 TIMES DAILY
Qty: 14 TABLET | Refills: 0 | Status: SHIPPED | OUTPATIENT
Start: 2019-11-29 | End: 2019-12-06

## 2019-11-29 ASSESSMENT — PAIN SCALES - GENERAL: PAINLEVEL: NO PAIN (0)

## 2019-11-29 ASSESSMENT — MIFFLIN-ST. JEOR: SCORE: 1524.98

## 2019-11-29 NOTE — PROGRESS NOTES
"Nikki Roper is a 29 year old female who presents to clinic today for the following health issues:    HPI   Patient presents today for an IUD removal and check for STD's.  She has a new partner, is  with unknown LAST MENSTUAL PERIOD.  Sindhu IUD was placed in 2019.  She wants to have a menstrual cycle and \"take a break from contraception.\"  She plans on using condoms. Last pap 10/18/19 NIL with negativ HPV, she has history of + HPV but is now on every 5 year pap/HPV screening.    Patient Active Problem List   Diagnosis     CARDIOVASCULAR SCREENING; LDL GOAL LESS THAN 160     Cervical high risk HPV (human papillomavirus) test positive     Allergic rhinitis     Intermittent asthma     Former smoker     BMI 25.0-25.9,adult     IUD check up     Past Surgical History:   Procedure Laterality Date     EXTRACTION(S) DENTAL         Social History     Tobacco Use     Smoking status: Former Smoker     Packs/day: 0.30     Years: 6.00     Pack years: 1.80     Types: Cigarettes     Smokeless tobacco: Never Used     Tobacco comment: 1-2 cigarettes per week   Substance Use Topics     Alcohol use: Yes     Comment: social     Family History   Problem Relation Age of Onset     Hypertension Father      C.A.D. Paternal Grandfather         MI     Heart Disease Paternal Grandfather      Unknown/Adopted Maternal Grandfather      Diabetes No family hx of      Breast Cancer No family hx of      Cancer - colorectal No family hx of          Current Outpatient Medications   Medication Sig Dispense Refill     Ascorbic Acid (VITAMIN C PO)        IRON PO Take 1 tablet by mouth       Omega-3 Fatty Acids (OMEGA-3 FISH OIL PO)        Prenatal Vit-Fe Fumarate-FA (PRENATAL MULTIVITAMIN W/IRON) 27-0.8 MG tablet Take 1 tablet by mouth daily       vitamin  B complex with vitamin C (VITAMIN  B COMPLEX) TABS Take 1 tablet by mouth daily       VITAMIN D, CHOLECALCIFEROL, PO Take by mouth daily       VITAMIN E COMPLEX PO        " "albuterol (PROAIR HFA, PROVENTIL HFA, VENTOLIN HFA) 108 (90 BASE) MCG/ACT inhaler Inhale 2 puffs into the lungs every 6 hours as needed for shortness of breath / dyspnea or wheezing (Patient not taking: Reported on 9/24/2019) 3 Inhaler 1     mefenamic acid (PONSTEL) 250 MG CAPS Take 2 capsules (500 mg) by mouth every 6 hours as needed for moderate pain (Patient not taking: Reported on 3/11/2019) 50 each 3     BP Readings from Last 3 Encounters:   11/29/19 97/63   09/24/19 115/66   03/11/19 122/81    Wt Readings from Last 3 Encounters:   11/29/19 75.8 kg (167 lb)   09/24/19 73.9 kg (163 lb)   03/11/19 72.2 kg (159 lb 3.2 oz)            Reviewed and updated as needed this visit by Provider         Review of Systems   ROS COMP: Constitutional, HEENT, cardiovascular, pulmonary, gi and gu systems are negative, except as otherwise noted.      Objective    BP 97/63 (BP Location: Left arm, Patient Position: Sitting, Cuff Size: Adult Large)   Pulse 69   Temp 98  F (36.7  C) (Oral)   Resp 16   Ht 1.734 m (5' 8.25\")   Wt 75.8 kg (167 lb)   LMP  (LMP Unknown)   SpO2 97%   Breastfeeding No   BMI 25.21 kg/m    Body mass index is 25.21 kg/m .  Physical Exam   GENERAL: healthy, alert and no distress  EYES: Eyes grossly normal to inspection, PERRL and conjunctivae and sclerae normal  HENT: ear canals and TM's normal, nose and mouth without ulcers or lesions  NECK: no adenopathy, no asymmetry, masses, or scars and thyroid normal to palpation  RESP: lungs clear to auscultation - no rales, rhonchi or wheezes  CV: regular rate and rhythm, normal S1 S2, no S3 or S4, no murmur, click or rub, no peripheral edema and peripheral pulses strong  ABDOMEN: soft, nontender, no hepatosplenomegaly, no masses and bowel sounds normal   (female): normal female external genitalia, normal urethral meatus, vaginal mucosa, normal cervix/adnexa/uterus without masses or discharge  MS: no gross musculoskeletal defects noted, no edema  SKIN: no " suspicious lesions or rashes  NEURO: Normal strength and tone, mentation intact and speech normal    Diagnostic Test Results:  Labs reviewed in Epic  No results found for this or any previous visit (from the past 24 hour(s)).        Assessment & Plan     1. Encounter for IUD removal  SUBJECTIVE:  Margarita oRper is a 29 year old requests removal of an laurie IUD.   She is  interested in conceivingNo.   Reports adverse side effect from the IUD, No  Symptoms include without any bleeding;Pain No, and/or increased vaginal discharge No.       Request alternative contraception No.      OBJECTIVE  External genitalia: normal without lesions.  Vagina: normal mucosa and rugae, with  Creamy white discharge.  Cervix: normal without lesion.    String is noted at the os, ringed forceps used to remove IUD.        ASSESSMENT  IUD Removal    PLAN   -Margarita tolerated procedure without immediate complication.     -Follow up with unexplained fever, abdominal or pelvic pain.      -Plans to use condoms for contraception   -Offered prenatal vitamins No       2. Encounter for other general counseling or advice on contraception   We discussed at length other contraceptive options including other IUD's, Nexplanon, Depo Provera.  She is not wanting to use combination contraceptives.   All questions were answered.  3. Mild intermittent asthma without complication  ACT=25 today.  Well controlle    4. Former smoker  Congratulated her on quitting smoking    5. Screen for STD (sexually transmitted disease)  Always use condoms to help prevent STI's.  - Hepatic panel  - HIV Antigen Antibody Combo  - UA with Microscopic reflex to Culture  - Wet prep  - Chlamydia trachomatis PCR  - Neisseria gonorrhoeae PCR  - Hepatitis B surface antigen    6.  Influenza vaccination declined by patient       7.  BV (bacterial vaginosis)    Medication started today, see epic for orders.  Patient is to avoid alcohol while on Flagyl.  I briefly discussed the  "pathophysiology of bacterial vaginosis and outlined the expected course.  I discussed the warning symptoms and signs that indicate an atypical course that would need urgent follow up.  Patient education materials given during examination today.    BMI:   Estimated body mass index is 25.21 kg/m  as calculated from the following:    Height as of this encounter: 1.734 m (5' 8.25\").    Weight as of this encounter: 75.8 kg (167 lb).   Weight management plan: Discussed healthy diet and exercise guidelines        Work on weight loss  Regular exercise  See Patient Instructions    No follow-ups on file.    GAURAV Stone [facility] [place]      "

## 2019-11-29 NOTE — PATIENT INSTRUCTIONS
At [facility], we strive to deliver an exceptional experience to you, every time we see you.  If you receive a survey in the mail, please send us back your thoughts. We really do value your feedback.    Based on your medical history, these are the current health maintenance/preventive care services that you are due for (some may have been done at this visit.)  Health Maintenance Due   Topic Date Due     PREVENTIVE CARE VISIT  10/10/2015     INFLUENZA VACCINE (1) 09/01/2019     ASTHMA CONTROL TEST  09/11/2019         Suggested websites for health information:  Www.[employer] : Up to date and easily searchable information on multiple topics.  Www.[employer].gov : medication info, interactive tutorials, watch real surgeries online  Www.familydoctor.org : good info from the Academy of Family Physicians  Www.cdc.gov : public health info, travel advisories, epidemics (H1N1)  Www.aap.org : children's health info, normal development, vaccinations  Www.health.[facility].[place].us : [place] dept of health, public health issues in [place], N1N1    Your care team:                            Family Medicine Internal Medicine   MD Doc Cabral MD Shantel Branch-Fleming, MD Katya Georgiev PA-C Nam Ho, MD Pediatrics   HANSA Gonzalez, OMAR NOLASCO CNP   MD Rosa Elena Steele MD Deborah Mielke, MD Kim Thein, APRN CNP      Clinic hours: Monday - Thursday 7 am-7 pm; Fridays 7 am-5 pm.   Urgent care: Monday - Friday 11 am-9 pm; Saturday and Sunday 9 am-5 pm.  Pharmacy : Monday -Thursday 8 am-8 pm; Friday 8 am-6 pm; Saturday and Sunday 9 am-5 pm.     Clinic: [phone]   Pharmacy: [phone]    Patient Education     Etonogestrel implant  What is this medicine?  ETONOGESTREL (et oh oskar RYAN trel) is a contraceptive (birth control) device. It is used to prevent pregnancy. It can be used for up to 3 years.  How should I use this medicine?  This device is  inserted just under the skin on the inner side of your upper arm by a health care professional.  Talk to your pediatrician regarding the use of this medicine in children. Special care may be needed.  What side effects may I notice from receiving this medicine?  Side effects that you should report to your doctor or health care professional as soon as possible:    allergic reactions like skin rash, itching or hives, swelling of the face, lips, or tongue    breast lumps    changes in emotions or moods    depressed mood    heavy or prolonged menstrual bleeding    pain, irritation, swelling, or bruising at the insertion site    scar at site of insertion    signs of infection at the insertion site such as fever, and skin redness, pain or discharge    signs of pregnancy    signs and symptoms of a blood clot such as breathing problems; changes in vision; chest pain; severe, sudden headache; pain, swelling, warmth in the leg; trouble speaking; sudden numbness or weakness of the face, arm or leg    signs and symptoms of liver injury like dark yellow or brown urine; general ill feeling or flu-like symptoms; light-colored stools; loss of appetite; nausea; right upper belly pain; unusually weak or tired; yellowing of the eyes or skin    unusual vaginal bleeding, discharge    signs and symptoms of a stroke like changes in vision; confusion; trouble speaking or understanding; severe headaches; sudden numbness or weakness of the face, arm or leg; trouble walking; dizziness; loss of balance or coordination  Side effects that usually do not require medical attention (report to your doctor or health care professional if they continue or are bothersome):    acne    back pain    breast pain    changes in weight    dizziness    general ill feeling or flu-like symptoms    headache    irregular menstrual bleeding    nausea    sore throat    vaginal irritation or inflammation  What may interact with this medicine?  Do not take this medicine  with any of the following medications:    amprenavir    fosamprenavir  This medicine may also interact with the following medications:    acitretin    aprepitant    armodafinil    bexarotene    bosentan    carbamazepine    certain medicines for fungal infections like fluconazole, ketoconazole, itraconazole and voriconazole    certain medicines to treat hepatitis, HIV or AIDS    cyclosporine    felbamate    griseofulvin    lamotrigine    modafinil    oxcarbazepine    phenobarbital    phenytoin    primidone    rifabutin    rifampin    rifapentine    Saxman's wort    topiramate  What if I miss a dose?  This does not apply.  Where should I keep my medicine?  This drug is given in a hospital or clinic and will not be stored at home.  What should I tell my health care provider before I take this medicine?  They need to know if you have any of these conditions:    abnormal vaginal bleeding    blood vessel disease or blood clots    breast, cervical, endometrial, ovarian, liver, or uterine cancer    diabetes    gallbladder disease    heart disease or recent heart attack    high blood pressure    high cholesterol or triglycerides    kidney disease    liver disease    migraine headaches    seizures    stroke    tobacco smoker    an unusual or allergic reaction to etonogestrel, anesthetics or antiseptics, other medicines, foods, dyes, or preservatives    pregnant or trying to get pregnant    breast-feeding  What should I watch for while using this medicine?  This product does not protect you against HIV infection (AIDS) or other sexually transmitted diseases.  You should be able to feel the implant by pressing your fingertips over the skin where it was inserted. Contact your doctor if you cannot feel the implant, and use a non-hormonal birth control method (such as condoms) until your doctor confirms that the implant is in place. Contact your doctor if you think that the implant may have broken or become bent while in your  arm.  You will receive a user card from your health care provider after the implant is inserted. The card is a record of the location of the implant in your upper arm and when it should be removed. Keep this card with your health records.  NOTE:This sheet is a summary. It may not cover all possible information. If you have questions about this medicine, talk to your doctor, pharmacist, or health care provider. Copyright  2019 Elsevier

## 2019-11-30 ENCOUNTER — MYC MEDICAL ADVICE (OUTPATIENT)
Dept: FAMILY MEDICINE | Facility: CLINIC | Age: 29
End: 2019-11-30

## 2019-11-30 DIAGNOSIS — J45.20 MILD INTERMITTENT ASTHMA WITHOUT COMPLICATION: Primary | ICD-10-CM

## 2019-11-30 ASSESSMENT — ASTHMA QUESTIONNAIRES: ACT_TOTALSCORE: 25

## 2019-12-02 NOTE — TELEPHONE ENCOUNTER
Routing to provider to advise; patient had IUD removed on 11/29.19.      Valentin Florez RN, BSN, PHN

## 2019-12-05 RX ORDER — ALBUTEROL SULFATE 90 UG/1
2 AEROSOL, METERED RESPIRATORY (INHALATION) EVERY 6 HOURS PRN
Qty: 3 INHALER | Refills: 1 | Status: ON HOLD | OUTPATIENT
Start: 2019-12-05 | End: 2022-06-18

## 2019-12-19 ENCOUNTER — VIRTUAL VISIT (OUTPATIENT)
Dept: FAMILY MEDICINE | Facility: CLINIC | Age: 29
End: 2019-12-19
Payer: COMMERCIAL

## 2019-12-19 DIAGNOSIS — N92.0 SPOTTING: Primary | ICD-10-CM

## 2019-12-19 PROCEDURE — 99442 ZZC PHYSICIAN TELEPHONE EVALUATION 11-20 MIN: CPT | Performed by: NURSE PRACTITIONER

## 2019-12-19 NOTE — PROGRESS NOTES
Patient opted to conduct today's return visit via telephone vs an in person visit to the clinic.    I spoke with: Margarita    The reason for the telephone visit was: Contraception    Pertinent history and review of systems: Patient hs been spotting X 3 days, dose not think she is pregnant, G`1P1, not using contraception but it would be OK if she were pregnant. She is wondering what she can to to stop the bleeding.  She denies STI history, no abnormal pap history, next pap due 10/18/21.    Assessment: Vaginal spotting    Advice/instructions given to patient/guardian including prescriptions, follow up appointment or orders for diagnostic testing: need to do testing to see why she is spotting- labs ordered and she'll come in to have them done.  We'll determine plan once she's completed labs.      Phone call contact time    Call Started at: 2:20    Call Ended at:2:31

## 2019-12-19 NOTE — LETTER
[address] [address]  [phone]        January 2, 2020    Margarita Roper  [address] [place] [id number]              Dear Margarita Roper    This is to remind you that your CBC Panel is due.    You may call our office at [phone] to schedule an appointment.    Please disregard this notice if you have already had your labs drawn or made an appointment.        Sincerely,        Veda Garcia PA-C

## 2019-12-19 NOTE — LETTER
[address] [address]  [phone]        February 27, 2020    Margarita Roper  [address] [place] [id number]              Dear Margarita Roper    This is to remind you that your non-fasting lab is due.    You may call our office at [phone] to schedule an appointment.    Please disregard this notice if you have already had your labs drawn or made an appointment.        Sincerely,        Veda Garcia

## 2019-12-19 NOTE — PATIENT INSTRUCTIONS
At [facility], we strive to deliver an exceptional experience to you, every time we see you. If you receive a survey, please complete it as we do value your feedback.  If you have MyChart, you can expect to receive results automatically within 24 hours of their completion.  Your provider will send a note interpreting your results as well.   If you do not have MyChart, you should receive your results in about a week by mail.    Your care team:                            Family Medicine Internal Medicine   MD Doc Cabral MD Shantel Branch-Fleming, MD Katya Georgiev PA-C Megan Hill, APRJACKSON Davis, MD Pediatrics   Vinay Stevenson, PABruceC  Moira Latif, MD Monse Membreno APRN CNP   MD Rosa Elena Steele MD Deborah Mielke, MD Kim Thein, APRN CNP      Clinic hours: Monday - Thursday 7 am-7 pm; Fridays 7 am-5 pm.   Urgent care: Monday - Friday 11 am-9 pm; Saturday and Sunday 9 am-5 pm.  Pharmacy : Monday -Thursday 8 am-8 pm; Friday 8 am-6 pm; Saturday and Sunday 9 am-5 pm.     Clinic: [phone]   Pharmacy: [phone]

## 2019-12-23 ENCOUNTER — NURSE TRIAGE (OUTPATIENT)
Dept: NURSING | Facility: CLINIC | Age: 29
End: 2019-12-23

## 2019-12-23 ENCOUNTER — NURSE TRIAGE (OUTPATIENT)
Dept: FAMILY MEDICINE | Facility: CLINIC | Age: 29
End: 2019-12-23

## 2019-12-23 NOTE — TELEPHONE ENCOUNTER
"Miscarriage suffered on Saturday (12/21/19) when she passed a \"baseball sized\" clot.  Since Sunday, has had intermittent bleeding with some clots noted.  Pain described as \"cramping\", changed pad 5 times in the past 24 hours.  Home care reviewed, reasons to be seen in ED reviewed.  Margarita to call back as needed for questions or concerns.    Additional Information    Negative: [1] SEVERE abdominal pain AND [2] present > 1 hour    Negative: SEVERE dizziness (e.g., unable to stand, requires support to walk, feels like passing out now)    Negative: Fever > 100.4 F (38.0 C)    Negative: SEVERE vaginal bleeding (i.e., soaking 2 pads or tampons per hour and present 2 or more hours)    Negative: Patient sounds very sick or weak to the triager    Negative: MODERATE vaginal bleeding (i.e., soaking 1 pad or tampon per hour and present > 6 hours)    Negative: [1] Constant abdominal pain AND [2] present > 2 hours    Negative: Pale skin (pallor) of new onset or worsening    Negative: Foul smelling vaginal discharge (i.e., lochia)    Negative: Bleeding with > 6 soaked pads per day    Negative: [1] Passing blood clots  AND [2] persists > 4 days postpartum    Negative: Taking Coumadin (warfarin) or other strong blood thinner, or known bleeding disorder (e.g., thrombocytopenia)    Negative: [1] Skin bruises or nosebleed AND [2] not caused by an injury    Normal postpartum bleeding    Negative: [1] Vaginal bleeding or spotting lasts > 4 weeks AND [2] red or red-brown    Negative: [1] Vaginal bleeding or spotting lasts > 5 weeks AND [2] pale-brown or pink    Protocols used: POSTPARTUM - VAGINAL BLEEDING AND LOCHIA-A-AH      "

## 2019-12-23 NOTE — TELEPHONE ENCOUNTER
"S-(situation): Patient feels she is having a miscarriage; wants D&C if possible    B-(background): Started bleeding heavily Saturday and has not stopped. Passing tissue and lots of clots    A-(assessment): Denies fever. Cramping, heavy vaginal bleeding, feels week    R-(recommendations): Be seen in the ED now    Reason for Disposition    SEVERE vaginal bleeding (i.e., soaking 2 pads per hour, large blood clots) and present 2 or more hours    Additional Information    Negative: Shock suspected (e.g., cold/pale/clammy skin, too weak to stand, low BP, rapid pulse)    Negative: Difficult to awaken or acting confused (e.g., disoriented, slurred speech)    Negative: Passed out (i.e., lost consciousness, collapsed and was not responding)    Negative: Sounds like a life-threatening emergency to the triager    Threatened miscarriage (threatened ) suspected and has not been examined by a HCP    Negative: Shock suspected (e.g., cold/pale/clammy skin, too weak to stand, low BP, rapid pulse)    Negative: Difficult to awaken or acting confused (e.g., disoriented, slurred speech)    Negative: Passed out (i.e., fainted, collapsed and was not responding)    Negative: Sounds like a life-threatening emergency to the triager    Vaginal bleeding and pregnant > 20 weeks    Negative: Passed out (i.e., fainted, collapsed and was not responding)    Negative: Shock suspected (e.g., cold/pale/clammy skin, too weak to stand, low BP, rapid pulse)    Negative: Difficult to awaken or acting confused (e.g., disoriented, slurred speech)    Negative: SEVERE vaginal bleeding (e.g., continuous red blood from vagina, or large blood clots)    Negative: SEVERE constant abdominal pain (e.g., excruciating) and present > 1 hour    Negative: Sounds like a life-threatening emergency to the triager    Vaginal bleeding and pregnant < 20 weeks    Answer Assessment - Initial Assessment Questions  1. DIAGNOSIS CONFIRMATION: \"When was the threatened " "miscarriage (threatened ) diagnosed?\" \"By whom?\"      Started Saturday  2. ULTRASOUND: \"Was an ultrasound performed at that time?\"  If so, \"Do you know what it showed?\"      N/A  3. PREGNANCY: \"Do you know how many weeks or months pregnant you are?\" \"When was the first day of your last normal menstrual period?\"      Not asked  4. MAIN CONCERN: \"What is your main concern right now?\" \"What questions do you have?\"      May need D&C  5. VAGINAL BLEEDING: \"Describe the bleeding that you are having.\" \"How much bleeding is there?\" Soaking over a pad an hour    - SPOTTING: spotting or pinkish / brownish mucous discharge; 1-2 pads a day    - MILD:  less than 1 pad / hour; similar to mild menstrual bleeding    - MODERATE: 1-2 pads / hour; small-medium blood clots (e.g., pea, grape, small coin)     - SEVERE: soaking 2 or more pads/hour; bleeding not contained by pads or continuous red blood from vagina; large blood clots (e.g., golf ball, large coin)       Severe  6. ABDOMINAL PAIN: \"Do you have any abdominal pain?\"  If present, ask: \"How bad is it?\"  (e.g., Scale 1-10; mild, moderate, or severe)    - MILD (1-3): doesn't interfere with normal activities, abdomen soft and not tender to touch     - MODERATE (4-7): interferes with normal activities or awakens from sleep, tender to touch     - SEVERE (8-10): excruciating pain, doubled over, unable to do any normal activities      Moderate crammping  7. HEMODYNAMIC STATUS: \"Are you weak or feeling lightheaded?\" If so, ask: \"Can you stand and walk normally?\"       Can stand and walk normally, but does feel weaker today than yesterday  8. OTHER SYMPTOMS: \"What other symptoms are you having with the bleeding?\" (e.g., passed tissue, vaginal discharge, fever, menstrual-type cramps, nausea, vomiting)      Passed tissue, bleeding heavily since Saturday, weak    Protocols used:  - THREATENED MISCARRIAGE FOLLOW-UP CALL-A-[place], PREGNANCY - VAGINAL BLEEDING LESS THAN 20 WEEKS " EGA-A-[place], PREGNANCY - VAGINAL BLEEDING GREATER THAN 20 WEEKS EGA-A-[place]          Valentin Florez RN, BSN, PHN

## 2019-12-23 NOTE — TELEPHONE ENCOUNTER
Patient was asked to call back to the nurse if the patient had any concerns.    [phone]    Okay to call anytime and leave a message.    Thank you.

## 2019-12-25 ENCOUNTER — MYC MEDICAL ADVICE (OUTPATIENT)
Dept: FAMILY MEDICINE | Facility: CLINIC | Age: 29
End: 2019-12-25

## 2019-12-27 ENCOUNTER — MYC MEDICAL ADVICE (OUTPATIENT)
Dept: FAMILY MEDICINE | Facility: CLINIC | Age: 29
End: 2019-12-27

## 2019-12-27 ENCOUNTER — E-VISIT (OUTPATIENT)
Dept: FAMILY MEDICINE | Facility: CLINIC | Age: 29
End: 2019-12-27
Payer: COMMERCIAL

## 2019-12-27 DIAGNOSIS — N92.0 MENORRHAGIA WITH REGULAR CYCLE: Primary | ICD-10-CM

## 2019-12-27 PROCEDURE — 99444 ZZC PHYSICIAN ONLINE EVALUATION & MANAGEMENT SERVICE: CPT | Performed by: NURSE PRACTITIONER

## 2019-12-29 ENCOUNTER — MYC MEDICAL ADVICE (OUTPATIENT)
Dept: FAMILY MEDICINE | Facility: CLINIC | Age: 29
End: 2019-12-29

## 2019-12-31 NOTE — PROGRESS NOTES
Patient seen in ER at [facility] on 12/23/19 after passing large clot and bleeding heavily.  Urine HCG was <1.  She states she had a miscarriage- Us as below.  US PELVIS WITH IVT-NON OB (12/23/2019 1:15 PM CST)  US PELVIS WITH IVT-NON OB (12/23/2019 1:15 PM CST)   Specimen         US PELVIS WITH IVT-NON OB (12/23/2019 1:15 PM CST)   Impressions Performed At   IMPRESSION:     1.  Complex material in the uterine fundus not present on the prior study. Given the appearance of a fluid fluid level is probably represents blood products. No discrete endometrial lesion.    2.  Left ovary with a 2.8 cm maximum diameter complex cyst not present on the prior study. This is probably a hemorrhagic cyst.        REPORT SIGNED BY DR. Hal Rojas       TEST       US PELVIS WITH IVT-NON OB (12/23/2019 1:15 PM CST)   Narrative Performed At   EXAM: US PELVIS WITH IVT-NON OB  12/23/2019 12:52 PM        CLINICAL DATA: Bleeding and cramping.        COMPARISON:  November 30, 2019.        TECHNIQUE: Transabdominal images were obtained. Subsequent endovaginal imaging obtained to better evaluate the endometrium and adnexal structures.        FINDINGS:        Uterus: The uterus measures 8.9 X 4.4 X 5.7 cm. No myometrial mass.        Endometrium: Evaluation the endometrium demonstrates overall thickness in the fundus of 1.1 cm. There appears to be a fluid fluid level within the fundus. No discrete endometrial blood flow to suggest lesion.        Right Ovary: 3.7 X 1.9 X 2.6 cm. 10 cc volume. Normal blood flow.        Left Ovary: 4.4 X 2.6 X 3.2 cm. Volume of 19 cc. Normal blood flow. Within the left ovary is a heterogeneous hypoechoic structure measuring 2.8 x 2.6 x 2.0 cm not present on the prior study.        No free fluid is identified.       TEST       She was treated with po Progesterone and sent home, hemodynamically stable.  Veda NOLASCO, CNP

## 2020-06-22 ENCOUNTER — ANCILLARY PROCEDURE (OUTPATIENT)
Dept: GENERAL RADIOLOGY | Facility: CLINIC | Age: 30
End: 2020-06-22
Attending: PHYSICIAN ASSISTANT
Payer: COMMERCIAL

## 2020-06-22 ENCOUNTER — TELEPHONE (OUTPATIENT)
Dept: FAMILY MEDICINE | Facility: CLINIC | Age: 30
End: 2020-06-22

## 2020-06-22 ENCOUNTER — OFFICE VISIT (OUTPATIENT)
Dept: URGENT CARE | Facility: URGENT CARE | Age: 30
End: 2020-06-22
Payer: COMMERCIAL

## 2020-06-22 VITALS
HEART RATE: 70 BPM | SYSTOLIC BLOOD PRESSURE: 113 MMHG | DIASTOLIC BLOOD PRESSURE: 67 MMHG | RESPIRATION RATE: 14 BRPM | BODY MASS INDEX: 25.6 KG/M2 | TEMPERATURE: 99.1 F | WEIGHT: 169.6 LBS | OXYGEN SATURATION: 98 %

## 2020-06-22 DIAGNOSIS — S83.242A TEAR OF MEDIAL MENISCUS OF LEFT KNEE, CURRENT, UNSPECIFIED TEAR TYPE, INITIAL ENCOUNTER: ICD-10-CM

## 2020-06-22 DIAGNOSIS — M25.562 ACUTE PAIN OF LEFT KNEE: Primary | ICD-10-CM

## 2020-06-22 PROCEDURE — 73562 X-RAY EXAM OF KNEE 3: CPT | Mod: LT

## 2020-06-22 PROCEDURE — 99214 OFFICE O/P EST MOD 30 MIN: CPT | Performed by: PHYSICIAN ASSISTANT

## 2020-06-22 ASSESSMENT — ENCOUNTER SYMPTOMS
SORE THROAT: 0
EYES NEGATIVE: 1
MYALGIAS: 0
FEVER: 0
ENDOCRINE NEGATIVE: 1
WEAKNESS: 0
NAUSEA: 0
PALPITATIONS: 0
VOMITING: 0
DIZZINESS: 0
CHILLS: 0
COUGH: 0
NECK STIFFNESS: 0
ARTHRALGIAS: 1
CARDIOVASCULAR NEGATIVE: 1
DIARRHEA: 0
HEMATOLOGIC/LYMPHATIC NEGATIVE: 1
BACK PAIN: 0
SHORTNESS OF BREATH: 0
NECK PAIN: 0
RHINORRHEA: 0
ALLERGIC/IMMUNOLOGIC NEGATIVE: 1
WOUND: 0
HEADACHES: 0
BRUISES/BLEEDS EASILY: 0
LIGHT-HEADEDNESS: 0
JOINT SWELLING: 0
RESPIRATORY NEGATIVE: 1

## 2020-06-22 NOTE — TELEPHONE ENCOUNTER
Reason for Call:  Other call back    Detailed comments: Pt would like to request an xray order be put in for her knee. States that she hurt it two weeks ago running and it hasn't improved. She would like a call back to advise. Thank you.    Phone Number Patient can be reached at: Home number on file [phone] (home)    Best Time: Any    Can we leave a detailed message on this number? YES    Call taken on 6/22/2020 at 7:12 AM by Nichole Boone

## 2020-06-22 NOTE — PROGRESS NOTES
"Chief Complaint:     Chief Complaint   Patient presents with     Knee Pain     Bilateral knee pain. Left knee popped 2 weeks ago while exercising       HPI: Margarita Roper is an 30 year old female who presents today for evaluation of L knee pain.  Onset of the pain was 2 weeks ago after she felt her knees pop while exercising.  The pain is worse with weight bearing.  She has been using ibuprofen with minimal relief.    The patient denies any locking or catching of the knee.  No feeling of instability.  Patient has not injured the knee in the past.    ROS:      Review of Systems   Constitutional: Negative for chills and fever.   HENT: Negative for congestion, ear pain, rhinorrhea and sore throat.    Eyes: Negative.    Respiratory: Negative.  Negative for cough and shortness of breath.    Cardiovascular: Negative.  Negative for chest pain and palpitations.   Gastrointestinal: Negative for diarrhea, nausea and vomiting.   Endocrine: Negative.    Genitourinary: Negative.    Musculoskeletal: Positive for arthralgias. Negative for back pain, joint swelling, myalgias, neck pain and neck stiffness.   Skin: Negative.  Negative for rash and wound.   Allergic/Immunologic: Negative.  Negative for immunocompromised state.   Neurological: Negative for dizziness, weakness, light-headedness and headaches.   Hematological: Negative.  Does not bruise/bleed easily.        Problem history  Patient Active Problem List   Diagnosis     CARDIOVASCULAR SCREENING; LDL GOAL LESS THAN 160     Cervical high risk HPV (human papillomavirus) test positive     Allergic rhinitis     Intermittent asthma     Former smoker     BMI 25.0-25.9,adult     IUD check up        Allergies  Allergies   Allergen Reactions     Penicillins Rash and Hives     Ampicillin Sodium      Corn Syrup [Glucose]      No Clinical Screening - See Comments      Allergic to corn     Gluten Meal Other (See Comments)     Allergic to gluten  \"Stomach blows up\", pain, constipation "     Lactalbumin Other (See Comments)     Bloated, constipated        Smoking History  History   Smoking Status     Former Smoker     Packs/day: 0.30     Years: 6.00     Types: Cigarettes   Smokeless Tobacco     Never Used     Comment: 1-2 cigarettes per week        Current Meds    Current Outpatient Medications:      albuterol (PROAIR HFA/PROVENTIL HFA/VENTOLIN HFA) 108 (90 Base) MCG/ACT inhaler, Inhale 2 puffs into the lungs every 6 hours as needed for shortness of breath / dyspnea or wheezing, Disp: 3 Inhaler, Rfl: 1     Ascorbic Acid (VITAMIN C PO), , Disp: , Rfl:      order for DME, Knee immobilizer and crutches, Disp: 1 each, Rfl: 0     Prenatal Vit-Fe Fumarate-FA (PRENATAL MULTIVITAMIN W/IRON) 27-0.8 MG tablet, Take 1 tablet by mouth daily, Disp: , Rfl:      vitamin  B complex with vitamin C (VITAMIN  B COMPLEX) TABS, Take 1 tablet by mouth daily, Disp: , Rfl:      VITAMIN D, CHOLECALCIFEROL, PO, Take by mouth daily, Disp: , Rfl:      VITAMIN E COMPLEX PO, , Disp: , Rfl:      IRON PO, Take 1 tablet by mouth, Disp: , Rfl:      mefenamic acid (PONSTEL) 250 MG CAPS, Take 2 capsules (500 mg) by mouth every 6 hours as needed for moderate pain (Patient not taking: Reported on 6/22/2020), Disp: 50 each, Rfl: 3     Omega-3 Fatty Acids (OMEGA-3 FISH OIL PO), , Disp: , Rfl:         Vital signs reviewed by Primitivo Nino PA-C  /67   Pulse 70   Temp 99.1  F (37.3  C) (Tympanic)   Resp 14   Wt 76.9 kg (169 lb 9.6 oz)   SpO2 98%   BMI 25.60 kg/m      Physical Exam     Physical Exam  Vitals signs and nursing note reviewed.   Constitutional:       General: She is not in acute distress.     Appearance: She is well-developed. She is not ill-appearing, toxic-appearing or diaphoretic.   HENT:      Head: Normocephalic and atraumatic.      Right Ear: Tympanic membrane and external ear normal. No drainage, swelling or tenderness. Tympanic membrane is not perforated, erythematous, retracted or bulging.      Left Ear:  Tympanic membrane and external ear normal. No drainage, swelling or tenderness. Tympanic membrane is not perforated, erythematous, retracted or bulging.      Nose: No mucosal edema, congestion or rhinorrhea.      Right Sinus: No maxillary sinus tenderness or frontal sinus tenderness.      Left Sinus: No maxillary sinus tenderness or frontal sinus tenderness.      Mouth/Throat:      Pharynx: No pharyngeal swelling, oropharyngeal exudate, posterior oropharyngeal erythema or uvula swelling.      Tonsils: No tonsillar abscesses.   Eyes:      Pupils: Pupils are equal, round, and reactive to light.   Neck:      Musculoskeletal: Full passive range of motion without pain, normal range of motion and neck supple.      Trachea: Trachea normal.   Cardiovascular:      Rate and Rhythm: Normal rate and regular rhythm.      Heart sounds: Normal heart sounds, S1 normal and S2 normal. No murmur. No friction rub. No gallop.    Pulmonary:      Effort: Pulmonary effort is normal. No respiratory distress.      Breath sounds: Normal breath sounds. No decreased breath sounds, wheezing, rhonchi or rales.   Abdominal:      General: Bowel sounds are normal. There is no distension.      Palpations: Abdomen is soft. Abdomen is not rigid. There is no mass.      Tenderness: There is no abdominal tenderness. There is no guarding or rebound.   Lymphadenopathy:      Cervical: No cervical adenopathy.   Skin:     General: Skin is warm and dry.   Neurological:      Mental Status: She is alert and oriented to person, place, and time.      Cranial Nerves: No cranial nerve deficit.      Deep Tendon Reflexes: Reflexes are normal and symmetric.   Psychiatric:         Behavior: Behavior normal. Behavior is cooperative.         Thought Content: Thought content normal.         Judgment: Judgment normal.          Knees: Left knee    Appearance: no lesions, swelling, ecchymosis     ROM:    Flexibility: full range of motion       Palpation:       Effusion: None        Joint Line: Pain worse on the medial side.        Patella:       Compression: Neg       Apprehension: Neg     Stability:       Lachman: Negative       Ant Drawer: Negative          Tibial tuberosity: no sig tenderness or swelling                  Weight Bearing:  the patient is able to bear weight on the injured leg at this time.  Pulses and sensation in both ankles are normal.  There is full ROM of both ankles.  Strength in both ankles is 5/5       ASSESSMENT     1. Acute pain of left knee    2. Tear of medial meniscus of left knee, current, unspecified tear type, initial encounter           PLAN    XR of the L knee was negative for any acute fracture per my read.  RICE discussed  High suspicion for meniscus tear.  Patient placed in knee immobilizer today.  She will be non-weight bearing using crutches.    Order placed for her to follow up with Sports medicine in 1-2 weeks.  Ice for 15-20 minutes every 2-3 hrs while awake for 2 days.  Elevation.  Pain relief: acetaminophen for the first 24 hours, then ibuprofen with food.  Patient verbalized understanding, and agrees with this plan.        Primitivo Nino PA-C  6/22/2020, 1:40 PM

## 2020-06-22 NOTE — TELEPHONE ENCOUNTER
Called and spoke with patient.    Margarita states that she ran for 90 secs, then walks. Then will go to a body squat.     I felt something in my left knee totally snap. Just walking is very aggravating. No swelling.     Right of the kneecap is where the pain is present.     Patient is wanting to go to  for an evaluation and xrays.     RN discuss hours and location with the patient and Margarita has agreed to go into UC today for assessment.    Heather Callahan RN  [facility], [place]

## 2020-06-22 NOTE — PATIENT INSTRUCTIONS
Patient Education     Knee Pain with Possible Torn Meniscus    The meniscus is a tough cartilage pad that cushions the inside of the knee joint. It helps absorb the shock from movement. It also spreads the weight of your body evenly across the knee joint. This prevents excess wear and tear to the bones of that joint.  The most common causes of meniscal tears are injuries, especially related to sports and degenerative disease that happens with aging.  A meniscus tear commonly happens during a twisting injury when the knee is bent. This causes pain, swelling, reduced movement of the knee, and trouble walking. There may be popping, clicking, joint locking or inability to completely straighten the knee. Ligaments of the knee may also be injured.  A torn meniscus is diagnosed by physical exam and X-rays. In the case of a severe injury, the knee may be too painful to examine fully. A more accurate exam can be done after the initial swelling goes down. An MRI may be done to make a final diagnosis.  If your healthcare provider suspects a meniscal injury, you will treat your knee with ice and rest and preventing movement of the knee. A splint or knee brace that keeps your leg straight may be put on to protect the joint. Depending on the severity of the injury, surgery may be needed. A cartilage injury may take 4 to 12 weeks to heal depending on how bad it is.  Home care    Stay off the injured leg as much as possible until you can walk on it without pain. If you have a lot of pain when walking, crutches or a walker may be prescribed. (These can be rented or bought at many pharmacies and surgical or orthopedic supply stores). Follow your healthcare provider's advice about when to begin putting weight on that leg.    Keep your leg elevated to reduce pain and swelling. When sleeping, place a pillow under the injured leg. When sitting, support the injured leg so it is above heart level. This is very important during the first  48 hours.    Apply an ice pack over the injured area for 15 to 20 minutes every 3 to 6 hours. You should do this for the first 24 to 48 hours. You can make an ice pack by filling a plastic bag that seals at the top with ice cubes and then wrapping it with a thin towel. Continue to use ice packs for relief of pain and swelling as needed. As the ice melts, be careful not to get your wrap, splint, or cast wet. After 48 hours, apply heat (warm shower or warm bath) for 15 to 20 minutes several times a day, or alternate ice and heat. You can place the ice pack directly over the splint. If you have to wear a hook-and-loop knee brace, you can open it to apply the ice pack, or heat, directly to the knee. Never put ice directly on the skin. Always wrap the ice in a towel or other type of cloth.    You may use over-the-counter pain medicine to control pain, unless another pain medicine was prescribed. If you have chronic liver or kidney disease or ever had a stomach ulcer or gastrointestinal bleeding, talk with your healthcare provider before using these medicines.    If you were given a splint, keep it dry at all times. Bathe with your splint out of the water. Protect it with a large plastic bag that is rubber-banded or taped at the top end. If a fiberglass splint gets wet, you can dry it with a hair dryer set on cool. If you have a hook-and-loop knee brace, you can remove this to bathe, unless told otherwise.    Check with your healthcare provider before returning to sports or full work duties.  Follow-up care  Follow up with your healthcare provider, or as advised. This is usually within 1 to 2 weeks. Further testing may be required to check the extent of your injury.  If X-rays were taken, you will be told of any new findings that may affect your care.  Call 911  Call 911 if you have:     Shortness of breath    Chest pain  When to seek medical advice  Call your healthcare provider right away if any of these occur:    Toes  or foot gets swollen, cold, blue, numb, or tingly    Pain or swelling spreads over the knee or calf    Warmth or redness appears over the knee or calf    Fever of 100.4 F (38 C) or higher, or as directed by your healthcare provider    Chills  Date Last Reviewed: 5/1/2018 2000-2019 [employer]. [address] [place] [address]. All rights reserved. This information is not intended as a substitute for professional medical care. Always follow your healthcare professional's instructions.

## 2020-06-29 ENCOUNTER — TELEPHONE (OUTPATIENT)
Dept: ORTHOPEDICS | Facility: CLINIC | Age: 30
End: 2020-06-29

## 2020-06-29 ENCOUNTER — OFFICE VISIT (OUTPATIENT)
Dept: ORTHOPEDICS | Facility: CLINIC | Age: 30
End: 2020-06-29
Attending: PHYSICIAN ASSISTANT
Payer: COMMERCIAL

## 2020-06-29 VITALS — BODY MASS INDEX: 25.61 KG/M2 | HEIGHT: 68 IN | WEIGHT: 169 LBS

## 2020-06-29 DIAGNOSIS — S83.242A TEAR OF MEDIAL MENISCUS OF LEFT KNEE, CURRENT, UNSPECIFIED TEAR TYPE, INITIAL ENCOUNTER: ICD-10-CM

## 2020-06-29 PROCEDURE — 99214 OFFICE O/P EST MOD 30 MIN: CPT | Performed by: FAMILY MEDICINE

## 2020-06-29 ASSESSMENT — MIFFLIN-ST. JEOR: SCORE: 1529.05

## 2020-06-29 ASSESSMENT — PAIN SCALES - GENERAL: PAINLEVEL: MODERATE PAIN (4)

## 2020-06-29 NOTE — PROGRESS NOTES
"      [facility]  6/29/2020    Margarita Roper's chief complaint for this visit includes:  Chief Complaint   Patient presents with     Consult     left knee pain, was doing interval running with body squats inbetween, when she felt a pop, she was unable weightbear, was using cruches, it able to put pressure today ,      PCP: Radha Arriaza    Referring Provider:  Primitivo Nino PA-C  [address] [place], [place] [address] 1.734 m (5' 8.25\")   Wt 76.7 kg (169 lb)   BMI 25.51 kg/m    Moderate Pain (4)       Reason for visit:     What part of your body is injured / painful?  left knee    What caused the injury /pain? squatting     How long ago did your injury occur or pain begin? several weeks ago    What are your most bothersome symptoms? Pain    How would you characterize your symptom?  aching    What makes your symptoms better? Rest    What makes your symptoms worse? Movement    Have you been previously seen for this problem? No    Medical History:    Any recent changes to your medical history? No    Any new medication prescribed since last visit? No    Have you had surgery on this body part before? No    Social History:    Occupation: Teacher     Handedness: Right    Review of Systems:    Do you have fever, chills, weight loss? No    Do you have any vision problems? No    Do you have any chest pain or edema? No    Do you have any shortness of breath or wheezing?  No    Do you have stomach problems? No    Do you have any urinary track issues? No    Do you have any numbness or focal weakness? No    Do you have diabetes? No    Do you have problems with bleeding or clotting? No    Do you have an rashes or other skin lesions? No       CHIEF COMPLAINT:  Consult (left knee pain, was doing interval running with body squats inbetween, when she felt a pop, she was unable weightbear, was using cruches, it able to put pressure today , )       HISTORY OF PRESENT ILLNESS  Ms. Roper is a " pleasant 30 year old female who presents to clinic today with left knee pain.  Xochitl injured her knee about 3 weeks ago.  She was doing interval runs with body weight squats in between the intervals.  While doing the squats she felt a pop in her left knee.  She was unable to bear weight immediately afterward.  She was seen at urgent care where she was given crutches and a knee immobilizer after a reassuring x-ray.  Her knee has gotten somewhat better, she is not using the crutches as of this past Wednesday.  She does still feel posterior and medial knee pain.    Additional history: as documented    MEDICAL HISTORY  Patient Active Problem List   Diagnosis     CARDIOVASCULAR SCREENING; LDL GOAL LESS THAN 160     Cervical high risk HPV (human papillomavirus) test positive     Allergic rhinitis     Intermittent asthma     Former smoker     BMI 25.0-25.9,adult     IUD check up       Current Outpatient Medications   Medication Sig Dispense Refill     albuterol (PROAIR HFA/PROVENTIL HFA/VENTOLIN HFA) 108 (90 Base) MCG/ACT inhaler Inhale 2 puffs into the lungs every 6 hours as needed for shortness of breath / dyspnea or wheezing 3 Inhaler 1     Ascorbic Acid (VITAMIN C PO)        IRON PO Take 1 tablet by mouth       mefenamic acid (PONSTEL) 250 MG CAPS Take 2 capsules (500 mg) by mouth every 6 hours as needed for moderate pain (Patient not taking: Reported on 6/22/2020) 50 each 3     Omega-3 Fatty Acids (OMEGA-3 FISH OIL PO)        order for DME Knee immobilizer and crutches 1 each 0     Prenatal Vit-Fe Fumarate-FA (PRENATAL MULTIVITAMIN W/IRON) 27-0.8 MG tablet Take 1 tablet by mouth daily       vitamin  B complex with vitamin C (VITAMIN  B COMPLEX) TABS Take 1 tablet by mouth daily       VITAMIN D, CHOLECALCIFEROL, PO Take by mouth daily       VITAMIN E COMPLEX PO          Allergies   Allergen Reactions     Penicillins Rash and Hives     Ampicillin Sodium      Corn Syrup [Glucose]      No Clinical Screening - See Comments   "    Allergic to corn     Gluten Meal Other (See Comments)     Allergic to gluten  \"Stomach blows up\", pain, constipation     Lactalbumin Other (See Comments)     Bloated, constipated       Family History   Problem Relation Age of Onset     Hypertension Father      C.A.D. Paternal Grandfather         MI     Heart Disease Paternal Grandfather      Unknown/Adopted Maternal Grandfather      Diabetes No family hx of      Breast Cancer No family hx of      Cancer - colorectal No family hx of        Additional medical/Social/Surgical histories reviewed in [facility] and updated as appropriate.        PHYSICAL EXAM  Vitals:    06/29/20 0827   Weight: 76.7 kg (169 lb)   Height: 1.734 m (5' 8.25\")     General  - normal appearance, in no obvious distress  HEENT  - conjunctivae not injected, moist mucous membranes  CV  - normal popliteal pulse  Pulm  - normal respiratory pattern, non-labored  Musculoskeletal - left knee  - stance: normal gait without limp, no obvious leg length discrepancy  - inspection: trace effusion, no ecchymosis, normal muscle tone, normal bone and joint alignment, no obvious deformity  - palpation: posterior joint line tenderness, patella and patellar tendon non-tender, normal popliteal pulse  - ROM: 135 degrees flexion, 0 degrees extension, pain at terminal flexion and extension passively  - strength: 5/5 in flexion, 5/5 in extension  - neuro: no sensory or motor deficit  - special tests:  (-) Lachman  (+) Estephanie  (-) varus at 0 and 30 degrees flexion  (-) valgus at 0 and 30 degrees flexion  Neuro  - no sensory or motor deficit, grossly normal coordination, normal muscle tone  Skin  - no ecchymosis, erythema, warmth, or induration, no obvious rash  Psych  - interactive, appropriate, normal mood and affect             ASSESSMENT & PLAN  Ms. Roper is a 30 year old female who presents to clinic today with a left knee injury.    I reviewed her x-ray which shows no obvious issues.    I do suspect that Xochitl " suffered a likely medial meniscus injury.  I am happy that this is improving, I do think this will continue to improve with watchful waiting, physical therapy, and the use of a brace as helpful.  We did provide her a brace today.    We should follow-up in 4 to 6 weeks to reassess.  If not improving I would likely order an MRI.    It was a pleasure seeing Margarita today.    Isidro Rosario DO, [facility]  Primary Care Sports Medicine      This note was constructed using Dragon dictation software, please excuse any minor errors in spelling, grammar, or syntax.

## 2020-06-29 NOTE — TELEPHONE ENCOUNTER
6/29 Provided phone number [phone] to schedule a follow up in 6 weeks around 8/10.     Karen Booth   Procedure    Ortho/Sports Med/Ent/Eye   [facility]   [phone]

## 2020-06-29 NOTE — LETTER
"    6/29/2020         RE: Margarita Roper  [address]  [address]  [facility] [address]        Dear Colleague,    Thank you for referring your patient, Margarita Roper, to the [facility]. Please see a copy of my visit note below.          [facility]  6/29/2020    Margarita Roper's chief complaint for this visit includes:  Chief Complaint   Patient presents with     Consult     left knee pain, was doing interval running with body squats inbetween, when she felt a pop, she was unable weightbear, was using cruches, it able to put pressure today ,      PCP: Radha Arriaza    Referring Provider:  Primitivo Nino PA-C  [address] [place] [address]    Ht 1.734 m (5' 8.25\")   Wt 76.7 kg (169 lb)   BMI 25.51 kg/m    Moderate Pain (4)       Reason for visit:     What part of your body is injured / painful?  left knee    What caused the injury /pain? squatting     How long ago did your injury occur or pain begin? several weeks ago    What are your most bothersome symptoms? Pain    How would you characterize your symptom?  aching    What makes your symptoms better? Rest    What makes your symptoms worse? Movement    Have you been previously seen for this problem? No    Medical History:    Any recent changes to your medical history? No    Any new medication prescribed since last visit? No    Have you had surgery on this body part before? No    Social History:    Occupation: Teacher     Handedness: Right    Review of Systems:    Do you have fever, chills, weight loss? No    Do you have any vision problems? No    Do you have any chest pain or edema? No    Do you have any shortness of breath or wheezing?  No    Do you have stomach problems? No    Do you have any urinary track issues? No    Do you have any numbness or focal weakness? No    Do you have diabetes? No    Do you have problems with bleeding or clotting? No    Do you have an rashes or other skin lesions? No   "     CHIEF COMPLAINT:  Consult (left knee pain, was doing interval running with body squats inbetween, when she felt a pop, she was unable weightbear, was using cruches, it able to put pressure today , )       HISTORY OF PRESENT ILLNESS  Ms. Roper is a pleasant 30 year old female who presents to clinic today with left knee pain.  Xochitl injured her knee about 3 weeks ago.  She was doing interval runs with body weight squats in between the intervals.  While doing the squats she felt a pop in her left knee.  She was unable to bear weight immediately afterward.  She was seen at urgent care where she was given crutches and a knee immobilizer after a reassuring x-ray.  Her knee has gotten somewhat better, she is not using the crutches as of this past Wednesday.  She does still feel posterior and medial knee pain.    Additional history: as documented    MEDICAL HISTORY  Patient Active Problem List   Diagnosis     CARDIOVASCULAR SCREENING; LDL GOAL LESS THAN 160     Cervical high risk HPV (human papillomavirus) test positive     Allergic rhinitis     Intermittent asthma     Former smoker     BMI 25.0-25.9,adult     IUD check up       Current Outpatient Medications   Medication Sig Dispense Refill     albuterol (PROAIR HFA/PROVENTIL HFA/VENTOLIN HFA) 108 (90 Base) MCG/ACT inhaler Inhale 2 puffs into the lungs every 6 hours as needed for shortness of breath / dyspnea or wheezing 3 Inhaler 1     Ascorbic Acid (VITAMIN C PO)        IRON PO Take 1 tablet by mouth       mefenamic acid (PONSTEL) 250 MG CAPS Take 2 capsules (500 mg) by mouth every 6 hours as needed for moderate pain (Patient not taking: Reported on 6/22/2020) 50 each 3     Omega-3 Fatty Acids (OMEGA-3 FISH OIL PO)        order for DME Knee immobilizer and crutches 1 each 0     Prenatal Vit-Fe Fumarate-FA (PRENATAL MULTIVITAMIN W/IRON) 27-0.8 MG tablet Take 1 tablet by mouth daily       vitamin  B complex with vitamin C (VITAMIN  B COMPLEX) TABS Take 1 tablet by  "mouth daily       VITAMIN D, CHOLECALCIFEROL, PO Take by mouth daily       VITAMIN E COMPLEX PO          Allergies   Allergen Reactions     Penicillins Rash and Hives     Ampicillin Sodium      Corn Syrup [Glucose]      No Clinical Screening - See Comments      Allergic to corn     Gluten Meal Other (See Comments)     Allergic to gluten  \"Stomach blows up\", pain, constipation     Lactalbumin Other (See Comments)     Bloated, constipated       Family History   Problem Relation Age of Onset     Hypertension Father      C.A.D. Paternal Grandfather         MI     Heart Disease Paternal Grandfather      Unknown/Adopted Maternal Grandfather      Diabetes No family hx of      Breast Cancer No family hx of      Cancer - colorectal No family hx of        Additional medical/Social/Surgical histories reviewed in [facility] and updated as appropriate.        PHYSICAL EXAM  Vitals:    06/29/20 0827   Weight: 76.7 kg (169 lb)   Height: 1.734 m (5' 8.25\")     General  - normal appearance, in no obvious distress  HEENT  - conjunctivae not injected, moist mucous membranes  CV  - normal popliteal pulse  Pulm  - normal respiratory pattern, non-labored  Musculoskeletal - left knee  - stance: normal gait without limp, no obvious leg length discrepancy  - inspection: trace effusion, no ecchymosis, normal muscle tone, normal bone and joint alignment, no obvious deformity  - palpation: posterior joint line tenderness, patella and patellar tendon non-tender, normal popliteal pulse  - ROM: 135 degrees flexion, 0 degrees extension, pain at terminal flexion and extension passively  - strength: 5/5 in flexion, 5/5 in extension  - neuro: no sensory or motor deficit  - special tests:  (-) Lachman  (+) Estephanie  (-) varus at 0 and 30 degrees flexion  (-) valgus at 0 and 30 degrees flexion  Neuro  - no sensory or motor deficit, grossly normal coordination, normal muscle tone  Skin  - no ecchymosis, erythema, warmth, or induration, no obvious " rash  Psych  - interactive, appropriate, normal mood and affect             ASSESSMENT & PLAN  Ms. Roper is a 30 year old female who presents to clinic today with a left knee injury.    I reviewed her x-ray which shows no obvious issues.    I do suspect that Xochitl suffered a likely medial meniscus injury.  I am happy that this is improving, I do think this will continue to improve with watchful waiting, physical therapy, and the use of a brace as helpful.  We did provide her a brace today.    We should follow-up in 4 to 6 weeks to reassess.  If not improving I would likely order an MRI.    It was a pleasure seeing Margarita today.    Isidro Rosario DO, [facility]  Primary Care Sports Medicine      This note was constructed using Dragon dictation software, please excuse any minor errors in spelling, grammar, or syntax.      Again, thank you for allowing me to participate in the care of your patient.        Sincerely,        Isidro Rosario DO

## 2020-07-03 ENCOUNTER — THERAPY VISIT (OUTPATIENT)
Dept: PHYSICAL THERAPY | Facility: CLINIC | Age: 30
End: 2020-07-03
Attending: FAMILY MEDICINE
Payer: COMMERCIAL

## 2020-07-03 DIAGNOSIS — M25.562 ACUTE PAIN OF LEFT KNEE: ICD-10-CM

## 2020-07-03 DIAGNOSIS — S83.242A TEAR OF MEDIAL MENISCUS OF LEFT KNEE, CURRENT, UNSPECIFIED TEAR TYPE, INITIAL ENCOUNTER: ICD-10-CM

## 2020-07-03 PROCEDURE — 97161 PT EVAL LOW COMPLEX 20 MIN: CPT | Mod: GP | Performed by: PHYSICAL THERAPIST

## 2020-07-03 PROCEDURE — 97110 THERAPEUTIC EXERCISES: CPT | Mod: GP | Performed by: PHYSICAL THERAPIST

## 2020-07-03 ASSESSMENT — ACTIVITIES OF DAILY LIVING (ADL)
AS_A_RESULT_OF_YOUR_KNEE_INJURY,_HOW_WOULD_YOU_RATE_YOUR_CURRENT_LEVEL_OF_DAILY_ACTIVITY?: ABNORMAL
KNEE_ACTIVITY_OF_DAILY_LIVING_SUM: 38
SIT WITH YOUR KNEE BENT: ACTIVITY IS MINIMALLY DIFFICULT
KNEEL ON THE FRONT OF YOUR KNEE: ACTIVITY IS SOMEWHAT DIFFICULT
WEAKNESS: THE SYMPTOM AFFECTS MY ACTIVITY SLIGHTLY
STIFFNESS: THE SYMPTOM AFFECTS MY ACTIVITY SLIGHTLY
LIMPING: THE SYMPTOM AFFECTS MY ACTIVITY SLIGHTLY
SQUAT: I AM UNABLE TO DO THE ACTIVITY
GIVING WAY, BUCKLING OR SHIFTING OF KNEE: I DO NOT HAVE THE SYMPTOM
PAIN: THE SYMPTOM AFFECTS MY ACTIVITY MODERATELY
RAW_SCORE: 38
KNEE_ACTIVITY_OF_DAILY_LIVING_SCORE: 54.29
WALK: ACTIVITY IS SOMEWHAT DIFFICULT
HOW_WOULD_YOU_RATE_THE_CURRENT_FUNCTION_OF_YOUR_KNEE_DURING_YOUR_USUAL_DAILY_ACTIVITIES_ON_A_SCALE_FROM_0_TO_100_WITH_100_BEING_YOUR_LEVEL_OF_KNEE_FUNCTION_PRIOR_TO_YOUR_INJURY_AND_0_BEING_THE_INABILITY_TO_PERFORM_ANY_OF_YOUR_USUAL_DAILY_ACTIVITIES?: 70
GO DOWN STAIRS: ACTIVITY IS FAIRLY DIFFICULT
SWELLING: THE SYMPTOM AFFECTS MY ACTIVITY MODERATELY
HOW_WOULD_YOU_RATE_THE_OVERALL_FUNCTION_OF_YOUR_KNEE_DURING_YOUR_USUAL_DAILY_ACTIVITIES?: ABNORMAL
STAND: ACTIVITY IS MINIMALLY DIFFICULT
RISE FROM A CHAIR: ACTIVITY IS FAIRLY DIFFICULT
GO UP STAIRS: ACTIVITY IS FAIRLY DIFFICULT

## 2020-07-03 NOTE — PROGRESS NOTES
[facility] Initial Evaluation  Subjective:  The history is provided by the patient. No  was used.   Patient Health History  Margarita Persons being seen for L  knee pain.     Problem began: 6/8/2020.   Problem occurred: while exercising; running   Pain is reported as 4/10 on pain scale.  General health as reported by patient is excellent.  Pertinent medical history includes: none.   Red flags:  None as reported by patient.  Medical allergies: none.   Surgeries include:  None.    Current medications:  None.    Current occupation is .   Primary job tasks include:  Driving, lifting/carrying, prolonged standing, prolonged sitting and repetitive tasks.                  Therapist Generated HPI Evaluation  Problem details: Patient presents to PT today with c/o L knee pain;  Patient stated the pain started 6/8/20.  Patient stated she was doing an interval exercise program which involved running and squats; stated she was in the middle of the squat; felt a pop and unable to get up out of the squat without assist.  .         Type of problem:  Left knee.    This is a new condition.  Condition occurred with:  Repetition/overuse.    Patient reports pain:  In the joint and sub patellar.        Associated symptoms:  Loss of motion/stiffness, loss of strength and edema. Symptoms are exacerbated by activity, ascending stairs, descending stairs, transfers, running, standing, weight bearing, sitting and walking  and relieved by bracing/immobilizing, ice and NSAID's.                 Knee Activity of Daily Living Score: 54.29            Objective:  System    Physical Exam    General     ROS    Assessment/Plan:    Patient is a 30 year old female with left side knee complaints.    Patient has the following significant findings with corresponding treatment plan.                Diagnosis 1:  L knee pain  Pain -  hot/cold therapy, manual therapy and splint/taping/bracing/orthotics  Decreased  strength - therapeutic exercise and therapeutic activities  Impaired muscle performance - neuro re-education  Decreased function - therapeutic activities    Therapy Evaluation Codes:   1) History comprised of:   Personal factors that impact the plan of care:      None.    Comorbidity factors that impact the plan of care are:      None.     Medications impacting care: None.  2) Examination of Body Systems comprised of:   Body structures and functions that impact the plan of care:      Knee.   Activity limitations that impact the plan of care are:      Bending, Lifting, Running, Squatting/kneeling, Stairs and transfers.  3) Clinical presentation characteristics are:   Stable/Uncomplicated.  4) Decision-Making    Low complexity using standardized patient assessment instrument and/or measureable assessment of functional outcome.  Cumulative Therapy Evaluation is: Low complexity.    Previous and current functional limitations:  (See Goal Flow Sheet for this information)    Short term and Long term goals: (See Goal Flow Sheet for this information)     Communication ability:  Patient appears to be able to clearly communicate and understand verbal and written communication and follow directions correctly.  Treatment Explanation - The following has been discussed with the patient:   RX ordered/plan of care  Anticipated outcomes  Possible risks and side effects  This patient would benefit from PT intervention to resume normal activities.   Rehab potential is good.    Frequency:  1 X week, once daily  Duration:  for 6 visits (patient would like to trial Video Visits if possible)  Discharge Plan:  Achieve all LTG.  Independent in home treatment program.  Reach maximal therapeutic benefit.    Please refer to the daily flowsheet for treatment today, total treatment time and time spent performing 1:1 timed codes.

## 2020-07-05 PROBLEM — M25.562 ACUTE PAIN OF LEFT KNEE: Status: ACTIVE | Noted: 2020-07-05

## 2020-07-20 NOTE — TELEPHONE ENCOUNTER
7/20 2nd attempt  Provided phone number [phone] to schedule a follow up in 6 weeks around 8/10.     Karen Booth   Procedure    Ortho/Sports Med/Ent/Eye   [facility]   [phone]

## 2020-08-03 NOTE — TELEPHONE ENCOUNTER
8/3 3rd attempt  Provided phone number [phone] to schedule a follow up in 6 weeks around 8/10.     Karen Booth   Procedure    Ortho/Sports Med/Ent/Eye   [facility]   [phone]

## 2020-08-27 ENCOUNTER — VIRTUAL VISIT (OUTPATIENT)
Dept: FAMILY MEDICINE | Facility: CLINIC | Age: 30
End: 2020-08-27
Payer: COMMERCIAL

## 2020-08-27 DIAGNOSIS — J45.20 MILD INTERMITTENT ASTHMA WITHOUT COMPLICATION: Primary | ICD-10-CM

## 2020-08-27 DIAGNOSIS — J02.9 SORE THROAT: ICD-10-CM

## 2020-08-27 DIAGNOSIS — H92.01 RIGHT EAR PAIN: ICD-10-CM

## 2020-08-27 PROCEDURE — 99212 OFFICE O/P EST SF 10 MIN: CPT | Mod: TEL | Performed by: NURSE PRACTITIONER

## 2020-08-27 NOTE — Clinical Note
Please read my note and please let me know if that is okay how I worded things.   Thank you,  GAURAV Tracey, NP-C  [facility]

## 2020-08-27 NOTE — PROGRESS NOTES
"Margarita Roper is a 30 year old female who is being evaluated via a billable telephone visit.      The patient has been notified of following:     \"This telephone visit will be conducted via a call between you and your physician/provider. We have found that certain health care needs can be provided without the need for a physical exam.  This service lets us provide the care you need with a short phone conversation.  If a prescription is necessary we can send it directly to your pharmacy.  If lab work is needed we can place an order for that and you can then stop by our lab to have the test done at a later time.    Telephone visits are billed at different rates depending on your insurance coverage. During this emergency period, for some insurers they may be billed the same as an in-person visit.  Please reach out to your insurance provider with any questions.    If during the course of the call the physician/provider feels a telephone visit is not appropriate, you will not be charged for this service.\"    Patient has given verbal consent for Telephone visit?  Yes    What phone number would you like to be contacted at? Cell phone    How would you like to obtain your AVS? 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  Has to use a mask at work. She recently overheated and vomited at work due to asthma attack and trouble breathing due to the mask.  She states she can wear the mask for up to 30 minutes then needs a break. Her classes with the kids she teaches are 90 minutes and she doesn't have the option to have a 2nd person there so she can take breaks.  She needs a letter stating she can use a face shield instead of a mask. Provider advised that if she were to have asymptomatic COVID-19 it is possible the virus could get under the face shield and " potentially infect others she is around.     When she goes into grocery stores or other stores she is able to wear a mask for that duration. Other than wearing a mask she feels her asthma is controlled. Uses albuterol once every 2 years.     Has sore throat as of last night. Right ear pain. Treating at home with garlic as feels it may be an ear infection.      After provider confirmed [employer] is not writing letters for patient's to not wear a mask or alternate with a face shield for patients, Margarita stated she does not feel like [employer] has her health in her best interest and she states she is done with [employer]. She verbally stated she wanted this statement in her chart and she is unhappy with the decision.  She states she doesn't know what she should do if she cannot tolerate her mask for long periods of time. Encouraged to her to work with her employer to see if they are okay with her using a face shield instead.       Review of Systems   Constitutional, HEENT-as above, cardiovascular, pulmonary-as above, gi and gu systems are negative, except as otherwise noted.       Objective          Vitals:  No vitals were obtained today due to virtual visit.    healthy, alert and no distress  PSYCH: Alert and oriented times 3; coherent speech, normal   rate and volume, able to articulate logical thoughts, able   to abstract reason, no tangential thoughts, no hallucinations   or delusions  Her affect is normal  RESP: No cough, no audible wheezing, able to talk in full sentences  Remainder of exam unable to be completed due to telephone visits    No results found for this or any previous visit (from the past 24 hour(s)).        Assessment/Plan:    Assessment & Plan     Mild intermittent asthma without complication  Patient has asthma, wanted a letter for her employer to wear a face shield instead of a mask. It is the state mandate to wear a mask and [employer] is not writing letters to replace a mask with a face shield at  "this time. Patient was suggested to work with her employer to see if they are okay with her wearing a face shield instead of a mask.       2. Sore throat/3. Right ear pain  Patient feels might be an ear infection, is treating with garlic at home for now.        BMI:   Estimated body mass index is 25.51 kg/m  as calculated from the following:    Height as of 6/29/20: 1.734 m (5' 8.25\").    Weight as of 6/29/20: 76.7 kg (169 lb).        Return in about 1 week (around 9/3/2020), or if symptoms worsen or fail to improve.    GAURAV Tracey, NP-C  [facility]    Phone call duration:  12 minutes                "

## 2020-10-19 PROBLEM — M25.562 ACUTE PAIN OF LEFT KNEE: Status: RESOLVED | Noted: 2020-07-05 | Resolved: 2020-10-19

## 2020-11-09 ENCOUNTER — VIRTUAL VISIT (OUTPATIENT)
Dept: FAMILY MEDICINE | Facility: OTHER | Age: 30
End: 2020-11-09
Payer: COMMERCIAL

## 2020-11-09 PROCEDURE — 99421 OL DIG E/M SVC 5-10 MIN: CPT | Performed by: FAMILY MEDICINE

## 2020-11-09 NOTE — PROGRESS NOTES
"Date: 2020 08:16:14  Clinician: Denia Hernández  Clinician NPI: [id number]  Patient: BRIANNE BRICE  Patient : 1990  Patient Address: [address] [place] [address], [place] [id number]  Patient Phone: [phone]  Visit Protocol: URI  Patient Summary:  BRIANNE is a 30 year old ( : 1990 ) female who initiated a [facility] Visit for COVID-19 (Coronavirus) evaluation and screening. When asked the question \"Please sign me up to receive news, health information and promotions from [facility].\", BRIANNE responded \"No\".    BRIANNE states her symptoms started gradually 3-4 days ago.   Her symptoms consist of rhinitis, facial pain or pressure, myalgia, malaise, and nasal congestion.   Symptom details     Nasal secretions: The color of her mucus is clear.    Facial pain or pressure: The facial pain or pressure feels worse when bending over or leaning forward.      BRIANNE denies having vomiting, chills, sore throat, teeth pain, ageusia, diarrhea, ear pain, headache, wheezing, fever, cough, nausea, and anosmia. She also denies taking antibiotic medication in the past month, having recent facial or sinus surgery in the past 60 days, and double sickening (worsening symptoms after initial improvement). She is not experiencing dyspnea.   Precipitating events  She has not recently been exposed to someone with influenza. BRIANNE has not been in close contact with any high risk individuals.   Pertinent COVID-19 (Coronavirus) information  BRIANNE does not work or volunteer as healthcare worker or a . In the past 14 days, BRIANNE has not worked or volunteered at a healthcare facility or group living setting.   In the past 14 days, she also has not lived in a congregate living setting.   BRIANNE has not had a close contact with a laboratory-confirmed COVID-19 patient within 14 days of symptom onset.    Since 2019, BRIANNE has not been tested for COVID-19 and has not had upper respiratory infection or " influenza-like illness.   Pertinent medical history  BRIANNE does not get yeast infections when she takes antibiotics.   BRIANNE does not need a return to work/school note.   Weight: 165 lbs   BRIANNE does not smoke or use smokeless tobacco.   She denies pregnancy and denies breastfeeding. She has menstruated in the past month.   Weight: 165 lbs    MEDICATIONS: No current medications, ALLERGIES: NKDA  Clinician Response:  Dear BRIANNE,   Your symptoms show that you may have coronavirus (COVID-19). This illness can cause fever, cough and trouble breathing. Many people get a mild case and get better on their own. Some people can get very sick.  What should I do?  We would like to test you for this virus.   1. Please call [phone] to schedule your visit. Explain that you were referred by [facility] to have a COVID-19 test. Be ready to share your [facility] visit ID number.  * If you need to schedule in [facility] please call [phone] or for [employer] employees please call [phone].  * If you need to schedule in the [place] area please call [phone]. [place] employees call [phone].  The following will serve as your written order for this COVID Test, ordered by me, for the indication of suspected COVID [Z20.828]: The test will be ordered in [employer], our electronic health record, after you are scheduled. It will show as ordered and authorized by Sylvain Acevedo MD.  Order: COVID-19 (Coronavirus) PCR for SYMPTOMATIC testing from [facility].   2. When it's time for your COVID test:  Stay at least 6 feet away from others. (If someone will drive you to your test, stay in the backseat, as far away from the  as you can.)   Cover your mouth and nose with a mask, tissue or washcloth.  Go straight to the testing site. Don't make any stops on the way there or back.      3.Starting now: Stay home and away from others (self-isolate) until:   You've had no fever---and no medicine that reduces fever---for one full day (28  "hours). And...   Your other symptoms have gotten better. For example, your cough or breathing has improved. And...   At least 10 days have passed since your symptoms started.       During this time, don't leave the house except for testing or medical care.   Stay in your own room, even for meals. Use your own bathroom if you can.   Stay away from others in your home. No hugging, kissing or shaking hands. No visitors.  Don't go to work, school or anywhere else.    Clean \"high touch\" surfaces often (doorknobs, counters, handles, etc.). Use a household cleaning spray or wipes. You'll find a full list of  on the EPA website: www.epa.gov/pesticide-registration/list-n-disinfectants-use-against-sars-cov-2.   Cover your mouth and nose with a mask, tissue or washcloth to avoid spreading germs.  Wash your hands and face often. Use soap and water.  Caregivers in these groups are at risk for severe illness due to COVID-19:  o People 65 years and older  o People who live in a nursing home or long-term care facility  o People with chronic disease (lung, heart, cancer, diabetes, kidney, liver, immunologic)  o People who have a weakened immune system, including those who:   Are in cancer treatment  Take medicine that weakens the immune system, such as corticosteroids  Had a bone marrow or organ transplant  Have an immune deficiency  Have poorly controlled HIV or AIDS  Are obese (body mass index of 40 or higher)  Smoke regularly   o Caregivers should wear gloves while washing dishes, handling laundry and cleaning bedrooms and bathrooms.  o Use caution when washing and drying laundry: Don't shake dirty laundry, and use the warmest water setting that you can.  o For more tips, go to www.cdc.gov/coronavirus/2019-ncov/downloads/10Things.pdf.    4.Sign up for [facility]. We know it's scary to hear that you might have COVID-19. We want to track your symptoms to make sure you're okay over the next 2 weeks. Please look for an " email from [facility]---this is a free, online program that we'll use to keep in touch. To sign up, follow the link in the email. Learn more at [email or web address]  How can I take care of myself?   Get lots of rest. Drink extra fluids (unless a doctor has told you not to).   Take Tylenol (acetaminophen) for fever or pain. If you have liver or kidney problems, ask your family doctor if it's okay to take Tylenol.   Adults can take either:    650 mg (two 325 mg pills) every 4 to 6 hours, or...   1,000 mg (two 500 mg pills) every 8 hours as needed.    Note: Don't take more than 3,000 mg in one day. Acetaminophen is found in many medicines (both prescribed and over-the-counter medicines). Read all labels to be sure you don't take too much.   For children, check the Tylenol bottle for the right dose. The dose is based on the child's age or weight.    If you have other health problems (like cancer, heart failure, an organ transplant or severe kidney disease): Call your specialty clinic if you don't feel better in the next 2 days.       Know when to call 911. Emergency warning signs include:    Trouble breathing or shortness of breath Pain or pressure in the chest that doesn't go away Feeling confused like you haven't felt before, or not being able to wake up Bluish-colored lips or face.  Where can I get more information?   [facility] -- About COVID-19: [email or web address]   CDC -- What to Do If You're Sick: www.cdc.gov/coronavirus/2019-ncov/about/steps-when-sick.html   CDC -- Ending Home Isolation: www.cdc.gov/coronavirus/2019-ncov/hcp/disposition-in-home-patients.html   CDC -- Caring for Someone: www.cdc.gov/coronavirus/2019-ncov/if-you-are-sick/care-for-someone.html   [facility] -- Interim Guidance for Hospital Discharge to Home: www.health.[facility].mn.us/diseases/coronavirus/hcp/hospdischarge.pdf   [facility] clinical trials (COVID-19 research studies):  clinicalaffairs.[facility]-clinical-trials    Below are the COVID-19 hotlines at the [place] Department of [facility] ([facility]). Interpreters are available.    For health questions: Call [phone] or 1-437.883.3975 (7 a.m. to 7 p.m.) For questions about schools and childcare: Call [phone] or 1-506.813.5840 (7 a.m. to 7 p.m.)    Diagnosis: Cough  Diagnosis ICD: R05

## 2020-11-14 DIAGNOSIS — Z20.822 SUSPECTED COVID-19 VIRUS INFECTION: Primary | ICD-10-CM

## 2020-11-15 DIAGNOSIS — Z20.822 SUSPECTED COVID-19 VIRUS INFECTION: ICD-10-CM

## 2020-11-15 PROCEDURE — U0003 INFECTIOUS AGENT DETECTION BY NUCLEIC ACID (DNA OR RNA); SEVERE ACUTE RESPIRATORY SYNDROME CORONAVIRUS 2 (SARS-COV-2) (CORONAVIRUS DISEASE [COVID-19]), AMPLIFIED PROBE TECHNIQUE, MAKING USE OF HIGH THROUGHPUT TECHNOLOGIES AS DESCRIBED BY CMS-2020-01-R: HCPCS | Performed by: FAMILY MEDICINE

## 2020-11-16 LAB
SARS-COV-2 RNA SPEC QL NAA+PROBE: NOT DETECTED
SPECIMEN SOURCE: NORMAL

## 2020-11-29 ENCOUNTER — HEALTH MAINTENANCE LETTER (OUTPATIENT)
Age: 30
End: 2020-11-29

## 2021-05-31 VITALS — HEIGHT: 69 IN | WEIGHT: 158 LBS | BODY MASS INDEX: 23.4 KG/M2

## 2021-06-01 VITALS — BODY MASS INDEX: 25.03 KG/M2 | WEIGHT: 169 LBS | HEIGHT: 69 IN

## 2021-06-01 VITALS — WEIGHT: 167 LBS | BODY MASS INDEX: 24.73 KG/M2 | HEIGHT: 69 IN

## 2021-06-01 VITALS — WEIGHT: 167 LBS | HEIGHT: 69 IN | BODY MASS INDEX: 24.73 KG/M2

## 2021-06-02 VITALS — BODY MASS INDEX: 24.6 KG/M2 | WEIGHT: 166.1 LBS | HEIGHT: 69 IN

## 2021-06-02 VITALS — BODY MASS INDEX: 24.79 KG/M2 | WEIGHT: 167.4 LBS | HEIGHT: 69 IN

## 2021-06-02 VITALS — WEIGHT: 167.4 LBS | BODY MASS INDEX: 24.79 KG/M2 | HEIGHT: 69 IN

## 2021-06-02 VITALS — HEIGHT: 69 IN | BODY MASS INDEX: 24.75 KG/M2 | WEIGHT: 167.1 LBS

## 2021-06-11 NOTE — PROGRESS NOTES
"Assessment:      27 y.o.,  Paragard IUD check - 9  months post-insertion, correct placement confirmed.   Patient unable to feel strings at home last night. Previously able to feel strings   Good pelvic floor tone   No bowel or urinary incontinence    Plan:     1.  Reassurance re: correct placement, normalcy of side effects and that these often lessen with extended use of IUD.   2.  Recommended taking ibuprofen 600 mg [id number] x 5 days to reduce amount and duration of bleeding, PRN   3.  Taught and encouraged to continue to check IUD strings monthly.    4.  Warning signs related to IUD use (PAINS) and when to call CNM reviewed.  5.    Evaluation of the pelvic floor today reveals good pelvic tone, and no evidence of prolapse, or cystocele or rectocele. Patient does kegels regularly and encouraged her to continue to do so.  Reviewed that there may be some very mild shifting that occurs postpartum if she has any concerns with urinary incontinence, or bowel incontinence to contact us for further evaluation.      Subjective:      Margarita Roper is a 27 y.o. female who presents for IUD check. This was inserted on 10/5/16. The patient has no complaints today. The patient is sexually active. She attempted to but could not check her IUD strings. She normally can feel her strings but could not last night.     The patient also states and has questions about her pelvic floor sensation and states that things just \"feel different\" after pregnancy.  She denies any urinary incontinence or bowel incontinence.  She states that she has just noticed that she seems to have decreased sensation during intercourse with her  but that this is only happened in the last few months or so.  She states that she did not notice this in the more immediate postpartum period.  Evaluation of the pelvic floor today reveals good pelvic tone, and no evidence of prolapse, or cystocele or rectocele.  Patient does kegels regularly and encouraged her " to continue to do so.      Review of Systems  Pertinent items are noted in HPI.     Objective:     Pelvic:SE only - normal vaginal and mucosal tissue.  No abnormal discharge or odor.  Cervix normal appearance with IUD strings visible x 2 without evidence of IUD itself.  Approximately 2 cm long.       GAURAV Alexander, HAWA  7/14/17  3:15 PM    Total time; 20 mins, Greater than 50% of time spent with patient was counseling, education and coordination of care.

## 2021-06-18 NOTE — PROGRESS NOTES
"  Office Visit - Physical   Margarita OCHOA Persons   28 y.o.  female    Date of visit: 6/13/2018  Physician: Tyler Grullon MD     Assessment and Plan   1. Routine general medical examination at a health care facility  Paperwork filled out.  She did not want anymore testing.  To new healthy lifestyle including healthy diet regular exercise and modest weight loss    2. Screening examination for pulmonary tuberculosis  - QTF-Mycobacterium tuberculosis by [facility]    Return in about 1 year (around 6/13/2019) for annual physical.     Chief Complaint   Chief Complaint   Patient presents with     Annual Exam        Patient Profile   Social History     Social History Narrative    Lives with her boyfriend, Luke and son, Thomas (7/14/16). Works in [employer] at [employer] .          Past Medical History   Patient Active Problem List   Diagnosis     Blood type, Rh negative     IUD (intrauterine device) in place     IUD surveillance     IUD strings lost     IUD (intrauterine device) in place       Past Surgical History  She has a past surgical history that includes Colposcopy.     History of Present Illness   This 28 y.o. old comes in for annual physical.  She had some paperwork for me to fill out for school.    Review of Systems: A comprehensive review of systems was negative except as noted.     Medications and Allergies   Current Outpatient Prescriptions   Medication Sig Dispense Refill     copper (PARAGARD T 380A) 380 square mm IUD IUD 1 each by Intrauterine route once. Lot [id number]  Exp 10/2022  Placed 10/5/16       CYANOCOBALAMIN, VITAMIN B-12, (VITAMIN B-12 ORAL) Take by mouth.       No current facility-administered medications for this visit.      Allergies   Allergen Reactions     Ampicillin Hives     Gluten Other (See Comments)     \"Stomach blows up\", pain, constipation     Lactalbumin Unknown     Penicillins Unknown        Family and Social History   Family History   Problem Relation Age of Onset     No Medical " "Problems Mother      Hypertension Father      Heart disease Brother      No Medical Problems Maternal Aunt      No Medical Problems Maternal Uncle      No Medical Problems Paternal Aunt      No Medical Problems Paternal Uncle      No Medical Problems Maternal Grandmother      Kidney disease Maternal Grandfather      Stroke Paternal Grandmother      Heart disease Paternal Grandfather      No Medical Problems Brother      No Medical Problems Son         Social History   Substance Use Topics     Smoking status: Former Smoker     Quit date: 11/11/2015     Smokeless tobacco: Never Used     Alcohol use 1.2 oz/week     2 Standard drinks or equivalent per week        Physical Exam   General Appearance:   No acute distress    /74 (Patient Site: Right Arm, Patient Position: Sitting, Cuff Size: Adult Regular)  Pulse 78  Ht 5' 8.5\" (1.74 m)  Wt 169 lb (76.7 kg)  SpO2 98%  BMI 25.32 kg/m2    EYES: Eyelids, conjunctiva, and sclera were normal. Pupils were normal. Cornea, iris, and lens were normal bilaterally.  HEAD, EARS, NOSE, MOUTH, AND THROAT: Head and face were normal. Hearing was normal to voice and the ears were normal to external exam. Nose appearance was normal and there was no discharge. Oropharynx was normal.  NECK: Neck appearance was normal. There were no neck masses and the thyroid was not enlarged.  RESPIRATORY: Breathing pattern was normal and the chest moved symmetrically.  Percussion/auscultatory percussion was normal.  Lung sounds were normal and there were no abnormal sounds.  CARDIOVASCULAR: Heart rate and rhythm were normal.  S1 and S2 were normal and there were no extra sounds or murmurs. Peripheral pulses in arms and legs were normal.  Jugular venous pressure was normal.  There was no peripheral edema.  GASTROINTESTINAL: The abdomen was normal in contour.  Bowel sounds were present.  Percussion detected no organ enlargement or tenderness.  Palpation detected no tenderness, mass, or enlarged " organs.   MUSCULOSKELETAL: Skeletal configuration was normal and muscle mass was normal for age. Joint appearance was overall normal.  LYMPHATIC: There were no enlarged nodes.  SKIN/HAIR/NAILS: Skin color was normal.  There were no skin lesions.  Hair and nails were normal.  NEUROLOGIC: The patient was alert and oriented to person, place, time, and circumstance. Speech was normal. Cranial nerves were normal. Motor strength was normal for age. The patient was normally coordinated.  PSYCHIATRIC:  Mood and affect were normal and the patient had normal recent and remote memory. The patient's judgment and insight were normal.       Additional Information        Tyler Grullon MD  Internal Medicine  Contact me at [phone]

## 2021-06-19 NOTE — PROGRESS NOTES
Office Visit - Follow Up   Margarita Roper   28 y.o. female    Date of Visit: 7/18/2018    Chief Complaint   Patient presents with     Anxiety        Assessment and Plan   1. Adjustment disorder with mixed anxiety and depressed mood  We discussed anxiety at length.  She is going to return to her therapist for cognitive behavioral therapy.  We also discussed online insomnia based cognitive behavioral therapy.  We discussed SSRIs.  At this point she does not want to start one.  If she does not improve with above measures she will consider this.  I did give her low-dose Lorazepam to use as needed for panic attacks.  She will try to limit this to 1 or 2 times per week.  We discussed dependency and abuse issues.  We discussed sedative effects of the medication and that she should not drive or mix with alcohol which she is taking this.    2. Panic attack  As above    Return in about 4 weeks (around 8/15/2018) for recheck.     History of Present Illness   This 28 y.o. old and comes in for evaluation of anxiety and panic attacks.  She has a long history of mild generalized anxiety.  This started as a teenager.  She is generally been able to manage through cognitive behavioral therapy.  Recently her life has been quite stressful.  She and her family are living with her mother.  Her mother's history of traumatic brain injury.  There is a lot of tension in the home regarding Monday and activities such as cooking and cleaning.  She is going to go to therapy with her mother.  They will be moving out in September.  She starting graduate school in an online program in [place].  There is a lot of paperwork and licensure think she needs to fill out.  She has been more irritable, sad.  She has been sleeping poorly.  She has had a couple of panic attacks a week.  She is able to use some of her mindfulness and talk therapy to help with these.  She has never been on antidepressant or anxiolytic medication in the past.  She does not  "want to be on a medication that she takes daily.  She would like and as needed medication.  She is no history of chemical dependency.  She drinks a few glasses of wine a week.    Review of Systems: A comprehensive review of systems was negative except as noted.     Medications, Allergies and Problem List   Reviewed and updated     Physical Exam   General Appearance:   No acute distress    /60 (Patient Site: Left Arm, Patient Position: Sitting, Cuff Size: Adult Regular)  Pulse 70  Ht 5' 8.5\" (1.74 m)  Wt 167 lb (75.8 kg)  SpO2 99%  BMI 25.02 kg/m2    HEENT exam is unremarkable  Neck supple no thyromegaly or nodule palpable  Lymphatic no cervical lymphadenopathy  Cardiovascular regular rate and rhythm no murmur gallop or rub  Pulmonary lungs are clear to auscultation bilaterally  Gastrointestinall abdomen soft nontender nondistended no organomegaly  Neurologic exam is non focal  Psychiatric pleasant, no confusion or agitation        Additional Information   Current Outpatient Prescriptions   Medication Sig Dispense Refill     copper (PARAGARD T 380A) 380 square mm IUD IUD 1 each by Intrauterine route once. Lot [id number]  Exp 10/2022  Placed 10/5/16       CYANOCOBALAMIN, VITAMIN B-12, (VITAMIN B-12 ORAL) Take by mouth.       LORazepam (ATIVAN) 0.5 MG tablet Take 1 tablet (0.5 mg total) by mouth every 8 (eight) hours as needed for anxiety. 20 tablet 0     No current facility-administered medications for this visit.      Allergies   Allergen Reactions     Ampicillin Hives     Gluten Other (See Comments)     \"Stomach blows up\", pain, constipation     Lactalbumin Unknown     Penicillins Unknown     Social History   Substance Use Topics     Smoking status: Former Smoker     Quit date: 11/11/2015     Smokeless tobacco: Never Used     Alcohol use 1.2 oz/week     2 Standard drinks or equivalent per week       Review and/or order of clinical lab tests:  Review and/or order of radiology tests:  Review and/or order of " medicine tests:  Discussion of test results with performing physician:  Decision to obtain old records and/or obtain history from someone other than the patient:  Review and summarization of old records and/or obtaining history from someone other than the patient and.or discussion of case with another health care provider:  Independent visualization of image, tracing or specimen itself:    Time:      Tyler Grullon MD

## 2021-06-19 NOTE — PROGRESS NOTES
"  Office Visit - Follow Up   Margarita Roper   28 y.o. female    Date of Visit: 8/8/2018    Chief Complaint   Patient presents with     Anxiety        Assessment and Plan   1. Chest wall pain  Likely musculoskeletal  - XR Ribs Right W PA Chest; Future    2. Anxiety  She is going to continue cognitive behavioral therapy would like to work with her homeopathic practitioner, taking dilution    3. Panic attacks  Is going to stop lorazepam as it did not help    Return in about 4 weeks (around 9/5/2018) for recheck.     History of Present Illness   This 28 y.o. old woman comes in for follow-up.  Last visit we had a long discussion about anxiety.  She is going to do some cognitive behavioral therapy.  We also prescribed lorazepam for panic attacks.  She has tried Lorazepam and does not like it.  It makes her too sleepy.  She has been meeting with homeopathic physician has been taking some delusions which she thinks has helped.  She would like to continue down this path.  She is also going to continue with her therapist.  She has been going to a chiropractor.  She has some manipulation of her spine and chest wall.  She has been having significant rib pain on the right and chest wall pain since manipulation.  She wonders if she broke her rib.    Review of Systems: A comprehensive review of systems was negative except as noted.     Medications, Allergies and Problem List   Reviewed and updated     Physical Exam   General Appearance:   No acute distress    /62 (Patient Site: Left Arm, Patient Position: Sitting, Cuff Size: Adult Regular)  Pulse 74  Ht 5' 8.5\" (1.74 m)  Wt 167 lb (75.8 kg)  SpO2 99%  BMI 25.02 kg/m2    She does have tenderness along her ribs laterally and posteriorly on the right.  Her lungs are clear cardiovascular regular rate and rhythm no murmur gallop or rub mood generally stable, pleasant, mildly anxious and occasionally tearful.     Additional Information   Current Outpatient Prescriptions " "  Medication Sig Dispense Refill     copper (PARAGARD T 380A) 380 square mm IUD IUD 1 each by Intrauterine route once. Lot [id number]  Exp 10/2022  Placed 10/5/16       CYANOCOBALAMIN, VITAMIN B-12, (VITAMIN B-12 ORAL) Take by mouth.       No current facility-administered medications for this visit.      Allergies   Allergen Reactions     Ampicillin Hives     Gluten Other (See Comments)     \"Stomach blows up\", pain, constipation     Lactalbumin Unknown     Penicillins Unknown     Social History   Substance Use Topics     Smoking status: Former Smoker     Quit date: 11/11/2015     Smokeless tobacco: Never Used     Alcohol use 1.2 oz/week     2 Standard drinks or equivalent per week       Review and/or order of clinical lab tests:  Review and/or order of radiology tests:  Review and/or order of medicine tests:  Discussion of test results with performing physician:  Decision to obtain old records and/or obtain history from someone other than the patient:  Review and summarization of old records and/or obtaining history from someone other than the patient and.or discussion of case with another health care provider:  Independent visualization of image, tracing or specimen itself:    Time:      Tyler Grullon MD  "

## 2021-06-20 NOTE — PROGRESS NOTES
"Margarita OCHOA Persons  [id number]  1990  09/10/18    Subjective:  Patient presents today for IUD removal.  Patient has the ParaGard IUD and shares that she has heavy cycles and is no longer happy with this method.  LMP was 8/27.  She reports that periods are 6 days long and she can at times bleeds through 2 tampons an hour.  She does not have any cramping, but does notice mittelschmerz.  Prior to using birth control she had regular cycles with 4-5 days of moderate bleeding and maybe 1-2 days of cramping.  Patient states that she had intercourse yesterday.  Patient is not interested in pregnancy at this time and desires to discuss other options.  When reviewing her menstrual history patient is on day 15 of her cycle.    Procedure:   BP 98/60  Pulse 64  Ht 5' 8.5\" (1.74 m)  Wt 167 lb 1.6 oz (75.8 kg)  LMP 08/24/2018 (Exact Date)  Breastfeeding? No  BMI 25.04 kg/m2  Risks of procedure discussed including pain, bleeding, inability to remove. Written consent provided.   Copious thin, stretchy clear mucus noted.   IUD strings easily visualized at cervical os. Grasped with ring forceps and removed without difficulty. Minimal/no bleeding, patient tolerated well.   Showed patient cervical mucus at tip of IUD to discuss what fertile cervical mucus looks like.     Assessment:   1. IUD removal  2. Contraceptive counseling including NFP  3. Emergency contraceptive counseling     Plan:   1. Discussed with patient that due to life span of sperm, fertile window based on patient tracking and confirmed by cervical mucus, there will be chance of pregnancy upon removal of IUD, offered to leave in, patient wants removed, encouraged use of plan B, offered RX also available OTC, patient shares will likely use OTC. We discussed recommendation to take ASAP and that this may lead to an irregular next cycle.   2. Encouraged to call with bleeding, cramping.    3. Discussed other contraceptive methods, we reviewed diaphragm and CAYA--how " to use, how to be fitted, efficacy and importance of correct use, we discussed POP MOA, common side effects, important of use at same time each day due to MOA and increased risk of pregnancy if miss a pill, and NFP--reviewed cervical mucus changes, BBT, mittleschmerz and tracking cycle.-- handouts provided on all options, r/b/a and side effects reviewed.   4. Offered hemoglobin check today, patient declines.   5. Follow up PRN    GAURAV Coleman, [facility] Nurse-Midwives  Total time 30 minutes with >50% spent face to face counseling prior to procedure.

## 2021-06-21 NOTE — PROGRESS NOTES
Assessment:     1.  Annual well woman exam  2.  Cervical cancer screening  3.  Contraception  4.  History of allergy to medication and environmental allergies     Plan:      1. Discussed nutrition and exercise.  Advised MVI, Vitamin D3 4000IU geltab and an omega 3 supplement daily   2. Blood tests: declines all HM /screening labs.  3. Breast self exam technique reviewed and patient encouraged to perform self-exam monthly.   4. Contraception: Natural family planning  5.  Next pap due today. HPV co-testing discussed with that pap, then paps q 5 yrs if both negative.  6.  Referral to allergist for testing.  7.  RTC 1 year for annual physical exam, PRN    Subjective:      Margarita Roper is a 28 y.o. female who presents for an annual exam.     Healthy Habits:   Regular Exercise: Yes   Sunscreen Use: Yes  Healthy Diet: Yes  Dental Visits Regularly: Yes  Seat Belt: Yes  Sexually active: Yes  STI risk No  domestic violence No    Self Breast Exam Monthly:Yes  Colonoscopy: N/A  Lipid Profile: No  Glucose Screen: No  Prevention of Osteoporosis: No  Last Dexa: N/A      Immunization History   Administered Date(s) Administered     HPV Quadrivalent 2007, 2007, 2009     Hep A / Hep B 2011     IPV 2011     Meningococcal MCV4 Conjugate,Unspecified 2006     Immunization status: delayed.    Gynecologic History  Patient's last menstrual period was 2018 (exact date).  Contraception: rhythm method    Cancer screening:   Last Pap: 2014. Results were: normal        OB History    Para Term  AB Living   1 1 1 0 0 1   SAB TAB Ectopic Multiple Live Births   0 0 0 0 1      # Outcome Date GA Lbr Nasim/2nd Weight Sex Delivery Anes PTL Lv   1 Term / 40w1d  7 lb 14 oz (3.572 kg) M Vag-Spont EPI  [facility]      Birth Comments: Long prodromal labor, spontaneous active labor, pushed x 1 hr, tear with repair, BFx 18 months          Current Outpatient Prescriptions   Medication Sig  Dispense Refill     CYANOCOBALAMIN, VITAMIN B-12, (VITAMIN B-12 ORAL) Take by mouth.       prenatal vitamin iron-folic acid 27mg-0.8mg (PRENATAL S) 27 mg iron- 800 mcg Tab tablet Take 1 tablet by mouth daily.       No current facility-administered medications for this visit.      Past Medical History:   Diagnosis Date     Abnormal Pap smear of cervix     Abnormal pap smear early 20's     Allergic     Ampicillin (rash), gluten (abdomnial distended)     Asthma     Ex induced asthma; last used inhaler 3 yr ago     Rh incompatibility      Varicella     As a child     Past Surgical History:   Procedure Laterality Date     COLPOSCOPY      early 20's after an abnormal pap smear     WISDOM TOOTH EXTRACTION       Ampicillin; Gluten; Lactalbumin; and Penicillins  Family History   Problem Relation Age of Onset     No Medical Problems Mother      Hypertension Father      Heart disease Brother      No Medical Problems Maternal Aunt      No Medical Problems Maternal Uncle      No Medical Problems Paternal Aunt      No Medical Problems Paternal Uncle      No Medical Problems Maternal Grandmother      Kidney disease Maternal Grandfather      Stroke Paternal Grandmother      Heart disease Paternal Grandfather      No Medical Problems Brother      No Medical Problems Son      Social History     Social History     Marital status: Single     Spouse name: Luke     Number of children: 1     Years of education: Master's degree     Occupational History     Teacher      Social History Main Topics     Smoking status: Former Smoker     Quit date: 11/11/2015     Smokeless tobacco: Never Used     Alcohol use 1.2 oz/week     2 Standard drinks or equivalent per week     Drug use: No     Sexual activity: Yes     Partners: Male     Other Topics Concern     Not on file     Social History Narrative    Lives with her boyfriend, Luke and son, Thomas (7/14/16). Works in [employer] at [employer] .         Review of Systems  General:  Denies problem  Eyes:  "Denies problem  Ears/Nose/Throat: Denies problem  Cardiovascular: Denies problem  Respiratory:  Denies problem  Gastrointestinal:  denies problems  Genitourinary: denies problems  Musculoskeletal:  Denies problem  Skin: Denies problem  Neurologic:denies problems  Psychiatric: denies problems  Endocrine: denies problems        Objective:         Vitals:    10/18/18 0735   BP: 108/60   Pulse: 80   Weight: 167 lb 6.4 oz (75.9 kg)   Height: 5' 8.5\" (1.74 m)       Physical Exam:  General Appearance: Alert, cooperative, no distress, appears stated age  Skin: Skin color, texture, turgor normal, no rashes or lesions  Throat: Lips, mucosa, and tongue normal; teeth and gums normal  HEENT: grossly normal; otoscopic and opthalmic exam not performed.   Neck: Supple, symmetrical, trachea midline, no adenopathy;  thyroid: not enlarged, symmetric, no tenderness/mass/nodules  Lungs: Clear to auscultation bilaterally, respirations unlabored  Breasts: No breast masses, tenderness, asymmetry, or nipple discharge.  Heart: Regular rate and rhythm, S1 and S2 normal, no murmur  Abdomen: Soft, non-tender. No organomegaly or masses  Pelvic:External genitalia normal without lesions or irritation. Vagina and cervix show no lesions, inflammation, discharge or tenderness. No cystocele, No rectocele. Uterus & adnexal normal without masses or tenderness.       "

## 2021-06-21 NOTE — PROGRESS NOTES
Assessment:    Allergic rhinitis with unknown specific sensitivities.  Intermittent asthma with allergy trigger  History of penicillin family allergy  Dermatographia    Plan:    Because of dermatographia, we are unable to do allergy skin testing.  We will do blood specific IgE testing.    Consider using daily antihistamines.  This can help with allergy symptoms but also with dermatographia.  Cetirizine 10 mg.    Albuterol 2 puffs every 4 hours as needed    Recommend penicillin testing when convenient  ____________________________________________________________________________     Margarita comes today for allergy evaluation.  She reports having significant allergy from childhood.  She recalls having environmental allergies.  She also had problems with milk and still has some problems with milk and gluten.  She avoids them somewhat.  Most of her symptoms are gastrointestinal.  She does report sneezing, itchy nose and eyes.  She has rhinorrhea and congestion.  She has occasional coughing.  The symptoms are worse in the fall.  She has a history of asthma as a child.  She has not had any problems for years until this past fall.  She was noted to have wheezing.  She had an old albuterol that she used and it did help somewhat.  She identifies rain as a trigger.  Also perfumes can trigger her symptoms.  She does not routinely take allergy medication.  She has a history of drug allergy to penicillin antibiotic.  It is written as ampicillin in her chart but she is unsure which particular antibiotic was used.  This was from childhood.  She does not recall being hospitalized.  She did think that it affected her breathing.  Details are not known    Review of symptoms:  As above, otherwise negative    Past medical history: No other chronic medical conditions noted.    Allergies: No known allergies to latex, foods or hymenoptera venom    Family history: Mother with allergies    Social history: Currently is lives in the same house  for 5 years.  It has forced air heat and central air conditioning.  There is a basement present.  No visible water seepage or mold.  No pets in the home.  Previous cigarette smoker stopping in 2015.  She estimates smoking 10 years 1/4 pack/day.      Medications: reviewed in chart    Physical Exam:  General:  Alert and in no apparent distress.  Eyes:  Sclera clear.  Ears: TMs translucent grey with bony landmarks visible. Nose: Pale, boggy mucosal membranes.  Throat: Pink, moist.  No lesions.  Neck: Supple.  No lymphadenopathy.  Lungs: CTA.  CV: Regular rate and rhythm. Extremities: Well perfused.  No clubbing or cyanosis. Skin: No rash    Spirometry: We were unable to get reproducible results.  Her best FVC was 3.75 L which is 88% of predicted.    Nitric oxide is 48 ppb    We attempted skin prick testing however she is dermatographic and we are unable to complete it.

## 2021-06-22 NOTE — TELEPHONE ENCOUNTER
Returned pt call regarding US from today.  There was a question as to when her LMP was.  She was seen in clinic today and had an US at [facility].  US showed:  [facility]  US OB BEFORE 14 WEEKS SINGLE FETUS, US OB TRANSVAGINAL  1/3/2019 5:00 PM     INDICATION: Dating  TECHNIQUE: Transabdominal scans were performed. Endovaginal ultrasound was  performed to better visualize the embryo.  COMPARISON: Clinical gestational age 5 weeks 6 days     FINDINGS:  UTERUS: Single intrauterine anechoic sac. Mean sac diameter is 4 mm which would  correlate with a 4 week 6 day gestation. No yolk sac, embryonic disc, or  heartbeat seen.  EDC: 9/6/19.     RIGHT OVARY: Normal.  LEFT OVARY: Normal size containing a 2.4 cm possible corpus luteum.     CONCLUSION:  1.  Single intrauterine anechoic sac suggesting a 4 week 6 day gestation.  2.  No embryonic disc or heartbeat seen.  3.  If clinically indicated, follow-up imaging could be performed in 10-14 days.    Discussed that this US seems more consistent with 11/26/18 LMP.  Recommended FU US in 2 weeks.  This has already been ordered by a different provider.  Pt given phone number to call to make that appt.

## 2021-06-22 NOTE — PROGRESS NOTES
Pregnancy Confirmation Visit:     Assessment:   1.  Missed menses; possible IUP at 9 weeks 6 days with certain last NORMAL menstrual period 10/26/2018 predicting STEVEN 8/2/2019    Plan:   1. Discussed maternity care options at [facility]- philosophy, care models, and locations.   2. Anticipatory guidance given re: first trimester discomforts and relief measures. Nutrition principles in early pregnancy briefly discussed. Encouraged PNV with folic acid, vitamin D, and DHA supplements. First trimester warning signs reviewed.   3. Dating ultrasound discussed. Accepts referral to [facility] to confirm pregnancy dating.   4. Genetic screening options briefly discussed and offered: First trimester screen (11-13.6wks @ [facility]), [facility] (10-12wks). Pt aware of options and will discuss further at IOB.   5. Pt encouraged to follow-up for IOB visit between 10 and 12 weeks.       TT spent with patient, 25 Mns, all of which was spent in counseling or coordination of care    Subjective:   Margarita Roper is a 28 y.o., here for pregnancy confirmation visit. This is an unplanned pregnancy but welcomed!. She is very happy to be pregnant.  UPT was positive at home on 12/31/2018. L (normal) MP 10/26/2018, sure.  On November 29, 2018 she reports experiencing a VERY light spotting which could have been a menstrual cycle but she does not believe so.  Has had regular cycles q 30 days.  Symptoms of pregnancy include: Exceedingly tender nipples and nausea.  Questions today regarding last normal menstrual period and dating the pregnancy.   ParaGard IUD was removed on 9/10/2018.    Review of Systems  Pertinent items are noted in HPI.      Objective:     Physical Exam:  General: Pleasant, articulate, well-groomed, well-nourished female.  Not in any apparent distress.    Urine pregnancy test today: POSITIVE

## 2021-06-22 NOTE — TELEPHONE ENCOUNTER
Patient calling for test results: Ultrasound done today. She knows she is pregnant.  Done @ [facility] today. Results are on the chart: will have CNM discuss results with patient. Paged Rima (on call CNM) to patient @ 7:03pm.   Hospital: ANSLEY Urbina RN Care Connection Triage Nurse

## 2021-06-23 NOTE — TELEPHONE ENCOUNTER
Telephone call from patient re: options due to unplanned pregnancy.  All options discussed and are likely available to patient due to early gestational age. All questions answered. Patient will call back when she has decided what she plans to do.  Aware that [facility] does not offer all options, patient has information about where to call and declines additional resources. Patient also reports that she has good emotional support and does not need any resources at this time.  GAURAV Benitez,CNM

## 2021-07-14 PROBLEM — Z97.5 IUD (INTRAUTERINE DEVICE) IN PLACE: Status: RESOLVED | Noted: 2017-07-14 | Resolved: 2019-01-03

## 2021-07-14 PROBLEM — T83.32XA IUD STRINGS LOST: Status: RESOLVED | Noted: 2017-07-14 | Resolved: 2018-07-18

## 2021-09-19 ENCOUNTER — HEALTH MAINTENANCE LETTER (OUTPATIENT)
Age: 31
End: 2021-09-19

## 2021-12-02 ENCOUNTER — TRANSFERRED RECORDS (OUTPATIENT)
Dept: HEALTH INFORMATION MANAGEMENT | Facility: CLINIC | Age: 31
End: 2021-12-02

## 2021-12-02 ENCOUNTER — OFFICE VISIT (OUTPATIENT)
Dept: MIDWIFE SERVICES | Facility: CLINIC | Age: 31
End: 2021-12-02
Payer: COMMERCIAL

## 2021-12-02 VITALS
SYSTOLIC BLOOD PRESSURE: 108 MMHG | DIASTOLIC BLOOD PRESSURE: 62 MMHG | WEIGHT: 176 LBS | BODY MASS INDEX: 26.07 KG/M2 | HEART RATE: 64 BPM | HEIGHT: 69 IN

## 2021-12-02 DIAGNOSIS — O20.9 VAGINAL BLEEDING IN PREGNANCY, FIRST TRIMESTER: Primary | ICD-10-CM

## 2021-12-02 LAB
ABO/RH(D): NORMAL
ANTIBODY SCREEN: NEGATIVE
HCG SERPL-ACNC: ABNORMAL MLU/ML (ref 0–4)
SPECIMEN EXPIRATION DATE: NORMAL

## 2021-12-02 PROCEDURE — 86900 BLOOD TYPING SEROLOGIC ABO: CPT | Performed by: ADVANCED PRACTICE MIDWIFE

## 2021-12-02 PROCEDURE — 99213 OFFICE O/P EST LOW 20 MIN: CPT | Performed by: ADVANCED PRACTICE MIDWIFE

## 2021-12-02 PROCEDURE — 86850 RBC ANTIBODY SCREEN: CPT | Performed by: ADVANCED PRACTICE MIDWIFE

## 2021-12-02 PROCEDURE — 84702 CHORIONIC GONADOTROPIN TEST: CPT | Performed by: ADVANCED PRACTICE MIDWIFE

## 2021-12-02 PROCEDURE — 36415 COLL VENOUS BLD VENIPUNCTURE: CPT | Performed by: ADVANCED PRACTICE MIDWIFE

## 2021-12-02 PROCEDURE — 86901 BLOOD TYPING SEROLOGIC RH(D): CPT | Performed by: ADVANCED PRACTICE MIDWIFE

## 2021-12-02 ASSESSMENT — MIFFLIN-ST. JEOR: SCORE: 1559.77

## 2021-12-02 NOTE — PROGRESS NOTES
"Subjective:  Margarita presents to clinic with  Yasmany with concerns for early pregnancy bleeding. Reports \"3 to 4\" drops of fresh red blood to toilet. Endorses period-like, achy, cramping in right lower abdomen, more with activity but not always. Also notes recently getting over episode of hemorrhoids, but became more concerned over the course of the day with ongoing bleeding. Bleeding has now stopped, but cramping continues. Denies itching, irritation, change discharge smell/characteristics; unconcerned about infection.    7 weeks 4 days gestation by LMP 10/9/21, home preg test positive about 3 weeks ago. Last intercourse 3-4 days ago.    Objective:  /62 (BP Location: Right arm, Patient Position: Sitting, Cuff Size: Adult Regular)   Pulse 64   Ht 1.74 m (5' 8.5\")   Wt 79.8 kg (176 lb)   LMP 10/09/2021   Breastfeeding No   BMI 26.37 kg/m      Assessment:  1. Vaginal bleeding in pregnancy in the first trimester  2. Unknown Rh status    Plan:  Margarita was seen for early pregnancy bleeding.   - Labs: ABO/RH type & screen, serial quantitative beta-HCGs today and in 48 hours (follow-up not requires if viable IUP/cardiac activity present on ultrasound today)  - Dating and viability ultrasound ASAP; ordered.  - Physical exam deferred due to bleeding resolution and patient preference  - Reviewed common causes of first trimester bleeding including: Implantation bleeding, subchorionic hemorrhage, ectopic pregnancy and pregnancy loss.  - If Rh-, consider RhoGam.  Discussed with Dr. Godinez; RhoGam unnecessary if viable IUP is less than 8 weeks 0 days.  - Plan to follow-up with results to create plan of care.     - RTC or seek emergency care if symptoms worsen.     Patient was seen with student, JEFF Allen who was present for learning. I personally assessed, examined and made clinical decisions reflected in the documentation.     GAURAV Santana, CNANA ROSA    "

## 2021-12-05 ENCOUNTER — TELEPHONE (OUTPATIENT)
Dept: MIDWIFE SERVICES | Facility: CLINIC | Age: 31
End: 2021-12-05
Payer: COMMERCIAL

## 2021-12-06 ENCOUNTER — HOSPITAL ENCOUNTER (EMERGENCY)
Facility: CLINIC | Age: 31
Discharge: HOME | End: 2021-12-06
Attending: EMERGENCY MEDICINE | Admitting: EMERGENCY MEDICINE
Payer: COMMERCIAL

## 2021-12-06 ENCOUNTER — APPOINTMENT (OUTPATIENT)
Dept: ULTRASOUND IMAGING | Facility: CLINIC | Age: 31
End: 2021-12-06
Attending: EMERGENCY MEDICINE
Payer: COMMERCIAL

## 2021-12-06 VITALS
HEIGHT: 69 IN | WEIGHT: 175 LBS | SYSTOLIC BLOOD PRESSURE: 107 MMHG | TEMPERATURE: 98.3 F | OXYGEN SATURATION: 100 % | HEART RATE: 76 BPM | DIASTOLIC BLOOD PRESSURE: 51 MMHG | BODY MASS INDEX: 25.92 KG/M2 | RESPIRATION RATE: 14 BRPM

## 2021-12-06 DIAGNOSIS — O20.0 THREATENED MISCARRIAGE IN EARLY PREGNANCY: ICD-10-CM

## 2021-12-06 PROCEDURE — 99284 EMERGENCY DEPT VISIT MOD MDM: CPT | Mod: 25

## 2021-12-06 PROCEDURE — 76801 OB US < 14 WKS SINGLE FETUS: CPT

## 2021-12-06 ASSESSMENT — MIFFLIN-ST. JEOR: SCORE: 1563.17

## 2021-12-06 NOTE — ED PROVIDER NOTES
"  History   Chief Complaint:  Ingestion       HPI   Margaritacatarino Roper is a 31 year old pregnant  female with history of restless leg syndrome during pregnancy who presents with ingestion of Cat's claw last night at 1800.  She explained that she took 1 teaspoon due to restless legs and then discovered it's not safe to take while pregnant. She denied abnormal symptoms after ingestion.     Of note she is 8 weeks pregnant, has one living child, and one miscarriage. Last week she went to the doctor for vaginal bleeding and got an ultrasound with negative results. She reported the bleeding has since stopped. She sees the midwives of [facility] in [place]. She has been prescribed Rhogam during pregnancy in the past but her doctor has not prescribed it as she is only 8 weeks pregnant.        Review of Systems   Genitourinary: Positive for menstrual problem. Negative for vaginal bleeding and vaginal discharge.   Musculoskeletal:        + restless legs   All other systems reviewed and are negative.        Allergies:  Penicillins  Ampicillin Sodium  Corn Syrup  Gluten Meal  Lactalbumin    Medications:  Albuterol  Vitamin D  Prenatal vitamin  Iron    Past Medical History:     Allergies  Depression  Kidney stones  Asthma  RLS    Past Surgical History:    Colposcopy  Extraction dental  Orthopedic surgery  [place] tooth extraction     Family History:    Mother: cancer  Father: HTN  Brother: heart disease    Social History:  Presents with .     Physical Exam     Patient Vitals for the past 24 hrs:   BP Temp Temp src Pulse Resp SpO2 Height Weight   21 1252 116/50 98.3  F (36.8  C) Oral 78 18 100 % 1.753 m (5' 9\") 79.4 kg (175 lb)       Physical Exam  General: Patient is alert and normal appearing.  HEENT: Head atraumatic    Eyes: pupils equal and reactive. Conjunctiva clear   Nares: patent   Oropharynx: no lesions, uvula midline, no palatal draping, normal voice, no trismus  Neck: Supple without " lymphadenopathy, no meningismus  Chest: Heart regular rate and rhythm.   Lungs: Equal clear to auscultation with no wheeze or rales  Abdomen: Soft, non tender, nondistended, normal bowel sounds  Back: No costovertebral angle tenderness, no midline C, T or L spine tenderness  Neuro: Grossly nonfocal, normal speech, strength equal bilaterally, CN 2-12 intact  Extremities: No deformities, equal radial and DP pulses. No clubbing, cyanosis.  No edema  Skin: Warm and dry with no rash.       Emergency Department Course     Imaging:  US OB < 14 Weeks Single   Preliminary Result   IMPRESSION: Early single live intrauterine pregnancy of approximately   8 weeks 4 days gestation.        Report per radiology     Emergency Department Course:    Reviewed:  I reviewed nursing notes, vitals, past medical history and Care Everywhere    Assessments:  1415 I obtained history and examined the patient as noted above.   1545 I rechecked the patient and explained findings.     Disposition:  The patient was discharged to home.     Impression & Plan     Medical Decision Making:  Patient is a 31-year-old G3, P1 who is approximately 9 weeks pregnant by dates who presents the emergency department with concern for possible miscarriage.  A week ago she had vaginal bleeding was seen in her clinic with a normal ultrasound with IUP.  The bleeding has since stopped and not recurred.  She was not given RhoGam and per the note her OB stated it was unnecessary given that she was under 8 weeks gestation.  Patient's had no further bleeding here and therefore will not provide RhoGam here today.  Patient states no significant cramping different than typical early pregnancy cramping.  Patient does have a history of restless leg syndrome while pregnant and took an over-the-counter supplement called Perez clot last night.  She only took 1 dose that was approximately a teaspoon.  Them she read on the box that it can cause miscarriage.  She wants to continue with  this pregnancy.  She called her OB today and per their note they recommended her follow-up with the mother to baby teratogen expert to discuss possible effects of this.  Ultrasound today still reveals IUP with strong heartbeat.  Again she has no further vaginal bleeding.  I have again given her the information provided by her clinic to make follow-up regarding any teratogenic effects of taking this over-the-counter supplement.  At this point there is no emergent intervention given that this was nearly 24 hours ago and she only took 1 dose of it and has an IUP currently.  Recommend follow-up with her OB.  Patient was agreeable with this plan and all questions and concerns addressed.      Diagnosis:    ICD-10-CM    1. Threatened miscarriage in early pregnancy  O20.0        Discharge Medications:  New Prescriptions    No medications on file       Scribe Disclosure:  Berta VOSS, am serving as a scribe at 2:00 PM on 12/6/2021 to document services personally performed by Florina Overton MD based on my observations and the provider's statements to me.              Florina Overton MD  12/06/21 2160

## 2021-12-06 NOTE — DISCHARGE INSTRUCTIONS
Discharge Instructions  Vaginal Bleeding in Pregnancy    Bleeding in early pregnancy can be a sign of a miscarriage or an abnormal pregnancy, but often is innocent and the pregnancy will continue normally. We may do blood pregnancy tests and ultrasound to try to determine what is causing the bleeding, but sometimes we are unable to tell. If this is the case, it will often require more time, more blood tests, and another ultrasound.     Generally, every Emergency Department visit should have a follow-up clinic visit with either a primary or a specialty clinic/provider. Please follow-up as instructed by your emergency provider today.    Return to the Emergency Department if:  You have severe abdominal (belly) or pelvic pain.  You faint, or feel lightheaded or dizzy.   Your bleeding gets much worse.  You pass any tissue--solid material that does not look like a blood clot. If you pass tissue, save it (even if you have to pull it out of the toilet) and put it in a plastic bag or jar and bring it in.    You have a fever of 100.5 F or higher.  If no pregnancy could be seen:  It may be that everything is normal and it is just too early to see the pregnancy. It is also possible that you could have an ectopic pregnancy, which is a pregnancy in an abnormal location, such as in the tube ( tubal pregnancy ). We don t see evidence that this is likely today but we cannot exclude it with certainty until a pregnancy can be seen in the uterus (womb) and an ectopic pregnancy can cause severe internal bleeding or death so follow-up is very important.  You should not be alone, in case you suddenly become very sick.   You should not have sex or put anything in your vagina.     If a pregnancy was seen in your uterus:  If a heartbeat could be seen, the chance of miscarriage is lower but because you had bleeding the chance of a miscarriage still exists.  You should not have sex or put anything in your vagina.  Follow-up as directed with  your OB/GYN provider.     Facts about miscarriage: We hope you do not have a miscarriage, but if you do, here are important things to know:  Early miscarriage is very common, and having one miscarriage does not mean you will have problems with another pregnancy.  Nothing you did caused it. Taking medicine, drinking alcohol, having sex, exercising, or falling down will not cause a miscarriage.     If you were given a prescription for medicine here today, be sure to read all of the information (including the package insert) that comes with your prescription.  This will include important information about the medicine, its side effects, and any warnings that you need to know about.  The pharmacist who fills the prescription can provide more information and answer questions you may have about the medicine.  If you have questions or concerns that the pharmacist cannot address, please call or return to the Emergency Department.   Remember that you can always come back to the Emergency Department if you are not able to see your regular provider in the amount of time listed above, if you get any new symptoms, or if there is anything that worries you.

## 2021-12-06 NOTE — ED TRIAGE NOTES
Pt took an herb called cats claw and looked it up afterwards and found out it is used to abort pregnancies. Pt is 9 weeks pregnant and was not trying to abort. Pt c/o cramping, but it is not new. No vaginal bleeding. Pt reports clear discharge after taking the herb.

## 2021-12-06 NOTE — TELEPHONE ENCOUNTER
Phone call from Margarita, 32yo,  at 8-9 wks gestation with concern for ingesting catsclaw tincture to treat her restless legs. After ingesting approximately 5mL of a tincture, read that it was not to be ingested in pregnancy. This writer is not familiar with Catsclaw but encouraged pt to contact [facility] to connect with a teratogen specialist to discuss her exposure. Website and phone info given. First trimester warning signs and when to call reviewed. All questions answered, agrees with plan.     Maria Guadalupe Pratt, APRN, CNM, IBCLC  [facility]  Midwifery

## 2022-01-09 ENCOUNTER — HEALTH MAINTENANCE LETTER (OUTPATIENT)
Age: 32
End: 2022-01-09

## 2022-01-14 ENCOUNTER — PRENATAL OFFICE VISIT (OUTPATIENT)
Dept: MIDWIFE SERVICES | Facility: CLINIC | Age: 32
End: 2022-01-14
Payer: COMMERCIAL

## 2022-01-14 VITALS
HEART RATE: 76 BPM | BODY MASS INDEX: 26.22 KG/M2 | HEIGHT: 69 IN | WEIGHT: 177 LBS | DIASTOLIC BLOOD PRESSURE: 54 MMHG | SYSTOLIC BLOOD PRESSURE: 106 MMHG

## 2022-01-14 DIAGNOSIS — Z34.80 SUPERVISION OF OTHER NORMAL PREGNANCY: Primary | ICD-10-CM

## 2022-01-14 DIAGNOSIS — Z13.79 GENETIC SCREENING: ICD-10-CM

## 2022-01-14 DIAGNOSIS — I49.3 PVC'S (PREMATURE VENTRICULAR CONTRACTIONS): ICD-10-CM

## 2022-01-14 LAB
ABO/RH(D): NORMAL
ANTIBODY SCREEN: NEGATIVE
ERYTHROCYTE [DISTWIDTH] IN BLOOD BY AUTOMATED COUNT: 12.3 % (ref 10–15)
HBA1C MFR BLD: 5 % (ref 0–5.6)
HCT VFR BLD AUTO: 34.3 % (ref 35–47)
HGB BLD-MCNC: 12.1 G/DL (ref 11.7–15.7)
HIV 1+2 AB+HIV1 P24 AG SERPL QL IA: NEGATIVE
MCH RBC QN AUTO: 31.5 PG (ref 26.5–33)
MCHC RBC AUTO-ENTMCNC: 35.3 G/DL (ref 31.5–36.5)
MCV RBC AUTO: 89 FL (ref 78–100)
PLATELET # BLD AUTO: 258 10E3/UL (ref 150–450)
RBC # BLD AUTO: 3.84 10E6/UL (ref 3.8–5.2)
SPECIMEN EXPIRATION DATE: NORMAL
SPECIMEN EXPIRATION DATE: NORMAL
T4 FREE SERPL-MCNC: 0.86 NG/DL (ref 0.7–1.8)
TSH SERPL DL<=0.005 MIU/L-ACNC: 1.42 UIU/ML (ref 0.3–5)
WBC # BLD AUTO: 8.3 10E3/UL (ref 4–11)

## 2022-01-14 PROCEDURE — 83036 HEMOGLOBIN GLYCOSYLATED A1C: CPT | Performed by: ADVANCED PRACTICE MIDWIFE

## 2022-01-14 PROCEDURE — 85027 COMPLETE CBC AUTOMATED: CPT | Performed by: ADVANCED PRACTICE MIDWIFE

## 2022-01-14 PROCEDURE — 84439 ASSAY OF FREE THYROXINE: CPT | Performed by: ADVANCED PRACTICE MIDWIFE

## 2022-01-14 PROCEDURE — 86780 TREPONEMA PALLIDUM: CPT | Performed by: ADVANCED PRACTICE MIDWIFE

## 2022-01-14 PROCEDURE — 86900 BLOOD TYPING SEROLOGIC ABO: CPT | Performed by: ADVANCED PRACTICE MIDWIFE

## 2022-01-14 PROCEDURE — 99207 PR FIRST OB VISIT: CPT | Performed by: ADVANCED PRACTICE MIDWIFE

## 2022-01-14 PROCEDURE — 36415 COLL VENOUS BLD VENIPUNCTURE: CPT | Performed by: ADVANCED PRACTICE MIDWIFE

## 2022-01-14 PROCEDURE — 86803 HEPATITIS C AB TEST: CPT | Performed by: ADVANCED PRACTICE MIDWIFE

## 2022-01-14 PROCEDURE — 87340 HEPATITIS B SURFACE AG IA: CPT | Performed by: ADVANCED PRACTICE MIDWIFE

## 2022-01-14 PROCEDURE — 99215 OFFICE O/P EST HI 40 MIN: CPT | Performed by: ADVANCED PRACTICE MIDWIFE

## 2022-01-14 PROCEDURE — 84443 ASSAY THYROID STIM HORMONE: CPT | Performed by: ADVANCED PRACTICE MIDWIFE

## 2022-01-14 PROCEDURE — 86901 BLOOD TYPING SEROLOGIC RH(D): CPT | Performed by: ADVANCED PRACTICE MIDWIFE

## 2022-01-14 PROCEDURE — 87389 HIV-1 AG W/HIV-1&-2 AB AG IA: CPT | Performed by: ADVANCED PRACTICE MIDWIFE

## 2022-01-14 PROCEDURE — 86850 RBC ANTIBODY SCREEN: CPT | Performed by: ADVANCED PRACTICE MIDWIFE

## 2022-01-14 PROCEDURE — 86762 RUBELLA ANTIBODY: CPT | Performed by: ADVANCED PRACTICE MIDWIFE

## 2022-01-14 PROCEDURE — 87086 URINE CULTURE/COLONY COUNT: CPT | Performed by: ADVANCED PRACTICE MIDWIFE

## 2022-01-14 ASSESSMENT — EDINBURGH POSTNATAL DEPRESSION SCALE (EPDS)
THE THOUGHT OF HARMING MYSELF HAS OCCURRED TO ME: NEVER
I HAVE BLAMED MYSELF UNNECESSARILY WHEN THINGS WENT WRONG: NOT VERY OFTEN
THINGS HAVE BEEN GETTING ON TOP OF ME: NO, MOST OF THE TIME I HAVE COPED QUITE WELL
I HAVE BEEN SO UNHAPPY THAT I HAVE HAD DIFFICULTY SLEEPING: NOT AT ALL
TOTAL SCORE: 4
I HAVE LOOKED FORWARD WITH ENJOYMENT TO THINGS: AS MUCH AS I EVER DID
I HAVE BEEN ABLE TO LAUGH AND SEE THE FUNNY SIDE OF THINGS: AS MUCH AS I ALWAYS COULD
I HAVE FELT SCARED OR PANICKY FOR NO GOOD REASON: NO, NOT AT ALL
I HAVE BEEN SO UNHAPPY THAT I HAVE BEEN CRYING: ONLY OCCASIONALLY
I HAVE BEEN ANXIOUS OR WORRIED FOR NO GOOD REASON: NO, NOT AT ALL
I HAVE FELT SAD OR MISERABLE: NOT VERY OFTEN

## 2022-01-14 ASSESSMENT — MIFFLIN-ST. JEOR: SCORE: 1564.31

## 2022-01-14 NOTE — PROGRESS NOTES
PRENATAL VISIT   FIRST OBSTETRICAL EXAM - OB     Assessment / Impression   1.  31-year-old  2 para 1-0-0-1 with IUP at 14 weeks 2 days x 8-week 1 day ultrasound performed on 2021  2.  Rh-  3.  Genetic screening  4.  Exercise-induced asthma  5.  History of PVCs  6.  Pregravid BMI 26    Plan:      -IOB labs drawn.   -Pt is not a candidate for drawing lead level per [facility] screening tool.   -Patient is not interested in waterbirth.    -Reviewed prenatal care schedule.   -Optimal nutrition and weight gain discussed. Pregnancy weight gain of 15-25 lbs (BMI 25.0-29.9) encouraged.   -Anticipatory guidance for common pregnancy questions and concerns reviewed.   -Danger s/sx for this trimester reviewed with patient.   -Reviewed genetic screening options with patient, patient Is to advanced in her pregnancy for first trimester screening. The patient does not elect for quad screening.   -Reviewed carrier testing options with patient, patient does not elect for testing or referral to genetic counseling.  -Margarita elects for noninvasive prenatal testing and desires to know  fetal sex.  Plan to offer single marker AFP when RTC.  -IOB packet given and reviewed with patient.   -CNM services and hospital options reviewed; emergency and scheduling phone numbers given to patient.   -The patient has the following high risk factors for preeclampsia:none. Therefore, low-dose aspirin will not be initiated.  -The patient has the following moderate risk factors for preeclampsia:none. Because the patient does not have 2 or more risk factors, low-dose aspirin not be initiated.  -Antepartum VTE risk factors absent.  -Patient is at increased risk for overt diabetes, so A1C will be added to IOB labs today.  -Pt is not a candidate for an antepartum OB consult.    -Return to clinic in 4 weeks or sooner as needed.    Total time: 40 minutes spent on the date of the encounter doing chart review, review of test results, patient visit and  documentation.     Subjective:   Margarita Roper is a 31 year old G 2 P 1001 here today for her first obstetrical exam at 14w2d. Here by herself. The patient reports nausea, but no vomiting, fatigue and some mild breast tenderness.  Patient's last menstrual period was 10/09/2021. Estimated Date of Delivery: 2022. Last period was normal. Her previous three cycles were normal. Her pregnancy is dated by 8-week 1 day ultrasound.     The patient states that she is in a monogamous relationship. Offered GC/CT screening today and patient declines.  Current symptoms also include: breast tenderness, fatigue and frequent urination.     Risk factors: None. Pregnancy Risk Factors:None    VTE antepartum risk factors (two or more risk factors, or 1 * risk factor, places patient at higher risk): none.   Clinical history/risk factors requiring antepartum OB consult: none.     The patient has the following risk factors for overt diabetes: Body mass index greater than or equal to 25 kg/m2. Plus the additional risk factor(s) of: No additional risk factors.    Social History:   Education level: Postgraduate  Occupation: English   Partners name: Yasmany Hunt   ?   OB History    Para Term  AB Living   2 1 1 0 0 1   SAB IAB Ectopic Multiple Live Births   0 0 0 0 1      # Outcome Date GA Lbr Nsaim/2nd Weight Sex Delivery Anes PTL Lv   2 Current            1 Term 16 40w1d  3.572 kg (7 lb 14 oz) M Vag-Spont EPI  [facility]      Birth Comments: Long prodromal labor, spontaneous active labor, pushed x 1 hr, tear with repair, BFx 18 months      Name: Thomas      Apgar1: 9  Apgar5: 9        History:   Past Medical History:   Diagnosis Date     Allergic rhinitis      Cervical high risk HPV (human papillomavirus) test positive 2010     Depression      History of kidney stones      Intermittent asthma       Past Surgical History:   Procedure Laterality Date     COLPOSCOPY      early 20's after an abnormal  pap smear     EXTRACTION(S) DENTAL       ORTHOPEDIC SURGERY       WISDOM TOOTH EXTRACTION        Family History   Problem Relation Age of Onset     Cancer Mother 63        Salivary gland     Hypertension Father      Heart Disease Brother      No Known Problems Maternal Grandmother      Unknown/Adopted Maternal Grandfather      Kidney Disease Maternal Grandfather      Cerebrovascular Disease Paternal Grandmother      C.A.D. Paternal Grandfather         MI     Heart Disease Paternal Grandfather      No Known Problems Son      No Known Problems Maternal Aunt      No Known Problems Maternal Uncle      No Known Problems Paternal Aunt      No Known Problems Paternal Uncle      Diabetes No family hx of      Breast Cancer No family hx of      Cancer - colorectal No family hx of       Social History     Tobacco Use     Smoking status: Former Smoker     Packs/day: 0.30     Years: 6.00     Pack years: 1.80     Types: Cigarettes     Smokeless tobacco: Never Used     Tobacco comment: 1-2 cigarettes per week   Substance Use Topics     Alcohol use: Not Currently     Comment: social     Drug use: No      Current Outpatient Medications   Medication Sig Dispense Refill     Prenatal Vit-Fe Fumarate-FA (PRENATAL MULTIVITAMIN W/IRON) 27-0.8 MG tablet Take 1 tablet by mouth daily       albuterol (PROAIR HFA/PROVENTIL HFA/VENTOLIN HFA) 108 (90 Base) MCG/ACT inhaler Inhale 2 puffs into the lungs every 6 hours as needed for shortness of breath / dyspnea or wheezing (Patient not taking: Reported on 12/2/2021) 3 Inhaler 1     Ascorbic Acid (VITAMIN C PO)  (Patient not taking: Reported on 12/2/2021)       IRON PO Take 1 tablet by mouth (Patient not taking: Reported on 12/2/2021)       Omega-3 Fatty Acids (OMEGA-3 FISH OIL PO)  (Patient not taking: Reported on 12/2/2021)       vitamin  B complex with vitamin C (VITAMIN  B COMPLEX) TABS Take 1 tablet by mouth daily (Patient not taking: Reported on 12/2/2021)       VITAMIN D, CHOLECALCIFEROL, PO  "Take by mouth daily (Patient not taking: Reported on 12/2/2021)       VITAMIN E COMPLEX PO  (Patient not taking: Reported on 12/2/2021)        Allergies   Allergen Reactions     Penicillins Rash and Hives     Ampicillin Sodium      Corn Syrup [Glucose]      Other [No Clinical Screening - See Comments]      Allergic to corn     Lactalbumin Other (See Comments)     Bloated, constipated        The patient's medical, surgical and family histories were reviewed and were pertinent to this visit.     Pap smear: Last Pap: 10/18/2018, Result: Normal/HPV negative.    EPDS score today: 4 .\"  0\" to #10   History of anxiety or depression: yes    Review of Systems   General: Fatigue but otherwise denies problem   Eyes: Denies problem   Ears/Nose/Throat: Denies problem   Cardiovascular: Denies problem   Respiratory: Denies problem   Gastrointestinal: Nausea without vomiting, otherwise negative   Genitourinary: Denies any discharge, vaginal bleeding or itchiness or any other problem   Musculoskeletal: Breast tenderness otherwise denies problem   Skin: Denies problem   Neurologic: Denies problem   Psychiatric: Denies problem   Endocrine: Denies problem   Heme/Lymphatic: Denies problem   Allergic/Immunologic: Denies problem         Objective:   Objective    Vitals:    01/14/22 1520   BP: 106/54   Pulse: 76   Weight: 80.3 kg (177 lb)   Height: 1.74 m (5' 8.5\")        Physical Exam:   General Appearance: Alert, cooperative, no distress, appears stated age   ALBINA: Normocephalic, without obvious abnormality, atraumatic. Conjunctiva/corneas clear, does not wear corrective lenses   Neck: Supple, symmetrical, trachea midline, no adenopathy.   Thyroid: not enlarged, symmetric, no tenderness/mass/nodules   Back: Symmetric, ROM normal.   Lungs: Clear to auscultation bilaterally, respirations unlabored   Heart: Regular rate and rhythm, S1 and S2 normal, no murmur. No edema to lower extremities.   Abdomen: Gravid, soft, non-tender, bowel sounds " active all four quadrants, no masses.   FHT: 156 bpm  Musculoskeletal: Extremities normal, atraumatic, no cyanosis   Skin: Skin color, texture, turgor normal, no rashes or lesions   Neurologic: Alert and oriented x 3. Normal speech

## 2022-01-15 PROBLEM — O26.899 RH NEGATIVE STATE IN ANTEPARTUM PERIOD: Status: ACTIVE | Noted: 2022-01-15

## 2022-01-15 PROBLEM — Z67.91 RH NEGATIVE STATE IN ANTEPARTUM PERIOD: Status: ACTIVE | Noted: 2022-01-15

## 2022-01-15 LAB
HBV SURFACE AG SERPL QL IA: NONREACTIVE
T PALLIDUM AB SER QL: NONREACTIVE

## 2022-01-16 LAB — BACTERIA UR CULT: NO GROWTH

## 2022-01-17 PROBLEM — Z30.431 IUD CHECK UP: Chronic | Status: RESOLVED | Noted: 2019-03-11 | Resolved: 2022-01-17

## 2022-01-17 LAB
HCV AB SERPL QL IA: NEGATIVE
RUBV IGG SERPL QL IA: 5.36 INDEX
RUBV IGG SERPL QL IA: POSITIVE

## 2022-01-21 ENCOUNTER — TELEPHONE (OUTPATIENT)
Dept: MIDWIFE SERVICES | Facility: CLINIC | Age: 32
End: 2022-01-21
Payer: COMMERCIAL

## 2022-01-21 PROBLEM — Z13.71 SCREENING FOR GENETIC DISEASE CARRIER STATUS: Status: ACTIVE | Noted: 2022-01-21

## 2022-01-21 LAB — SCANNED LAB RESULT: NORMAL

## 2022-02-25 ENCOUNTER — PRENATAL OFFICE VISIT (OUTPATIENT)
Dept: MIDWIFE SERVICES | Facility: CLINIC | Age: 32
End: 2022-02-25
Payer: COMMERCIAL

## 2022-02-25 VITALS
SYSTOLIC BLOOD PRESSURE: 128 MMHG | HEIGHT: 69 IN | HEART RATE: 76 BPM | BODY MASS INDEX: 27.11 KG/M2 | DIASTOLIC BLOOD PRESSURE: 56 MMHG | WEIGHT: 183 LBS

## 2022-02-25 DIAGNOSIS — O26.899 RH NEGATIVE STATE IN ANTEPARTUM PERIOD: Primary | ICD-10-CM

## 2022-02-25 DIAGNOSIS — Z34.80 SUPERVISION OF OTHER NORMAL PREGNANCY: ICD-10-CM

## 2022-02-25 DIAGNOSIS — Z67.91 RH NEGATIVE STATE IN ANTEPARTUM PERIOD: Primary | ICD-10-CM

## 2022-02-25 PROCEDURE — 99207 PR PRENATAL VISIT: CPT | Performed by: ADVANCED PRACTICE MIDWIFE

## 2022-02-26 PROBLEM — Z34.80 SUPERVISION OF OTHER NORMAL PREGNANCY: Status: ACTIVE | Noted: 2022-02-26

## 2022-02-27 NOTE — PROGRESS NOTES
Margarita presents with her  Yasmany.  They are VERY excited to have just returned from the 20-week fetal anatomy survey ultrasound (results of which are not available to this writer at the time of the prenatal visit today).  We discussed single marker alpha-fetoprotein, and she declines today.  Reports quickening!  Feeling bigger at 20 weeks this second pregnancy.  Second trimester teaching completed.  Desires to participate in yoga at [facility], but she is unable to enjoy the active yoga with a mask on due to breathlessness.  This writer encouraged her to inquire whether she could remote in to a class from home.  She denies abdominal pain, loss of fluid or vaginal bleeding.  Written information was given regarding  options, GDM screening, vaccines and pregnancy, Tdap, syphilis and Rh- (patient's ABO/Rh is O-).  Danger signs and symptoms reviewed.  All questions answered.  Encouraged to call or RTC with any questions, concerns, or as needed.

## 2022-02-28 ENCOUNTER — DOCUMENTATION ONLY (OUTPATIENT)
Facility: CLINIC | Age: 32
End: 2022-02-28
Payer: COMMERCIAL

## 2022-03-24 ENCOUNTER — TELEPHONE (OUTPATIENT)
Dept: MIDWIFE SERVICES | Facility: CLINIC | Age: 32
End: 2022-03-24
Payer: COMMERCIAL

## 2022-03-25 ENCOUNTER — PRENATAL OFFICE VISIT (OUTPATIENT)
Dept: MIDWIFE SERVICES | Facility: CLINIC | Age: 32
End: 2022-03-25
Payer: COMMERCIAL

## 2022-03-25 VITALS
HEART RATE: 92 BPM | BODY MASS INDEX: 28.19 KG/M2 | DIASTOLIC BLOOD PRESSURE: 48 MMHG | WEIGHT: 186 LBS | HEIGHT: 68 IN | SYSTOLIC BLOOD PRESSURE: 124 MMHG

## 2022-03-25 DIAGNOSIS — R55 PRE-SYNCOPE: ICD-10-CM

## 2022-03-25 DIAGNOSIS — O26.899 RH NEGATIVE STATE IN ANTEPARTUM PERIOD: ICD-10-CM

## 2022-03-25 DIAGNOSIS — I49.3 PVC'S (PREMATURE VENTRICULAR CONTRACTIONS): ICD-10-CM

## 2022-03-25 DIAGNOSIS — Z67.91 RH NEGATIVE STATE IN ANTEPARTUM PERIOD: ICD-10-CM

## 2022-03-25 DIAGNOSIS — Z34.80 SUPERVISION OF OTHER NORMAL PREGNANCY: Primary | ICD-10-CM

## 2022-03-25 LAB
ALBUMIN SERPL-MCNC: 2.9 G/DL (ref 3.5–5)
ALBUMIN UR-MCNC: NEGATIVE MG/DL
ALP SERPL-CCNC: 114 U/L (ref 45–120)
ALT SERPL W P-5'-P-CCNC: 13 U/L (ref 0–45)
ANION GAP SERPL CALCULATED.3IONS-SCNC: 10 MMOL/L (ref 5–18)
APPEARANCE UR: CLEAR
AST SERPL W P-5'-P-CCNC: 13 U/L (ref 0–40)
BILIRUB SERPL-MCNC: 0.2 MG/DL (ref 0–1)
BILIRUB UR QL STRIP: NEGATIVE
BUN SERPL-MCNC: 5 MG/DL (ref 8–22)
CALCIUM SERPL-MCNC: 8.8 MG/DL (ref 8.5–10.5)
CHLORIDE BLD-SCNC: 107 MMOL/L (ref 98–107)
CO2 SERPL-SCNC: 22 MMOL/L (ref 22–31)
COLOR UR AUTO: YELLOW
CREAT SERPL-MCNC: 0.62 MG/DL (ref 0.6–1.1)
ERYTHROCYTE [DISTWIDTH] IN BLOOD BY AUTOMATED COUNT: 12.9 % (ref 10–15)
GFR SERPL CREATININE-BSD FRML MDRD: >90 ML/MIN/1.73M2
GLUCOSE BLD-MCNC: 96 MG/DL (ref 70–125)
GLUCOSE UR STRIP-MCNC: NEGATIVE MG/DL
HCT VFR BLD AUTO: 33.3 % (ref 35–47)
HGB BLD-MCNC: 11.6 G/DL (ref 11.7–15.7)
HGB UR QL STRIP: NEGATIVE
KETONES UR STRIP-MCNC: NEGATIVE MG/DL
LEUKOCYTE ESTERASE UR QL STRIP: NEGATIVE
MCH RBC QN AUTO: 32.3 PG (ref 26.5–33)
MCHC RBC AUTO-ENTMCNC: 34.8 G/DL (ref 31.5–36.5)
MCV RBC AUTO: 93 FL (ref 78–100)
NITRATE UR QL: NEGATIVE
PH UR STRIP: 7 [PH] (ref 5–8)
PLATELET # BLD AUTO: 260 10E3/UL (ref 150–450)
POTASSIUM BLD-SCNC: 3.7 MMOL/L (ref 3.5–5)
PROT SERPL-MCNC: 5.8 G/DL (ref 6–8)
RBC # BLD AUTO: 3.59 10E6/UL (ref 3.8–5.2)
SODIUM SERPL-SCNC: 139 MMOL/L (ref 136–145)
SP GR UR STRIP: 1.01 (ref 1–1.03)
UROBILINOGEN UR STRIP-ACNC: 0.2 E.U./DL
WBC # BLD AUTO: 7.3 10E3/UL (ref 4–11)

## 2022-03-25 PROCEDURE — 99207 PR PRENATAL VISIT: CPT | Performed by: ADVANCED PRACTICE MIDWIFE

## 2022-03-25 PROCEDURE — 81003 URINALYSIS AUTO W/O SCOPE: CPT | Performed by: ADVANCED PRACTICE MIDWIFE

## 2022-03-25 PROCEDURE — 99213 OFFICE O/P EST LOW 20 MIN: CPT | Performed by: ADVANCED PRACTICE MIDWIFE

## 2022-03-25 PROCEDURE — 36415 COLL VENOUS BLD VENIPUNCTURE: CPT | Performed by: ADVANCED PRACTICE MIDWIFE

## 2022-03-25 PROCEDURE — 85027 COMPLETE CBC AUTOMATED: CPT | Performed by: ADVANCED PRACTICE MIDWIFE

## 2022-03-25 PROCEDURE — 80053 COMPREHEN METABOLIC PANEL: CPT | Performed by: ADVANCED PRACTICE MIDWIFE

## 2022-03-25 NOTE — TELEPHONE ENCOUNTER
Telephone call from [place] reporting that she had called around 4pm and talked to a midwife about feeling dizzy, lightheaded and like the room was spinning while she was sitting in a work meeting. Was told to rest, hydrate with water and sports drink and to call back if symptoms did not resolve or worsened. Patient was feeling okay after increased hydration, she was able to eat dinner and take her iron supplements, but then she had another episode while on her knees and leaning forward on an ottoman. Changing positions did help her to feel a bit better. Reports that she feels like there has been a significant growth spurt in the baby recently. Denies bleeding, LOF or changes in fetal movement. Concerned about being anemic and that that is the cause of her symptoms. She was not anemic at her IOB, but she is aware that that can change and she had a colleague pass out at school due to anemia in pregnancy. Has overall been hydrating well and eating small frequent meals throughout the day. Denies any current nasal or sinus congestion that could be causing inner ear issues, states she did have a cold a little while ago, but feels fully recovered. Reports that she has an appointment scheduled with Ning Salas CNM tomorrow. Recommended that patient keep that appointment. Discussed that Ning may choose to do blood work tomorrow due to her symptoms. Patient encouraged to rest tonight, hydrate well, eat small snacks as able, change positions if feeling unwell/dizzy and generally change positions slowly to avoid orthostatic hypotension. Patient to call back if symptoms worsen or she loses consciousness. All questions answered and patient is in agreement with the plan. She will call back PRN and otherwise keep her appointment tomorrow.     GAURAV Elizabeth, HAWA  [facility]

## 2022-03-28 PROBLEM — R55 PRE-SYNCOPE: Status: ACTIVE | Noted: 2022-03-28

## 2022-03-28 NOTE — PROGRESS NOTES
"Margarita presents for a problem visit today.  She was at work yesterday in a meeting.  But it was her return to speak, she reports feeling like she was going to pass out.  She never lost consciousness.  History of PVCs with abnormal EKGs in both 2017 in the year  followed by NORMAL echocardiograms.  During episode at work yesterday, she does endorse feeling symptomatic for PVCs.  Orthostatic hypotension not present.  Orthostatic blood pressures today:  After supine for 10 minutes: /50, heart rate 89  Seated: /62, heart rate 87  Standing: /64, heart rate 100  2022, TSH WNL.  Labs today: CBC, CMP, UA with UC if indicated.  Referral to cardiology for twelve-lead EKG, consideration for Holter monitoring, possibly echocardiogram and further recommendations.  Margarita endorses drinking 64 ounces of water most days.  Lately, has been craving Gatorade which helps her to feel, \"more clear minded.\"  Additionally, taking prenatal vitamin and iron supplementation as directed.  This writer strongly recommends 96 ounces of water daily plus Gatorade,  Adequate sleep and regular snacking/meals every 2 hours.  Declined single marker AFP.  Next visit: 1 hour GCT, antibody screen, hemoglobin, RPR and will offer Rhophylac and Tdap describing  benefits.   labor precautions and danger signs and symptoms reviewed.  All questions answered.  Encouraged to call or RTC if symptoms worsen or with any questions, concerns, or as needed.  "

## 2022-03-30 NOTE — PROGRESS NOTES
HPI and Plan:                         CURRENT MEDICATIONS:  Current Outpatient Medications   Medication Sig Dispense Refill     albuterol (PROAIR HFA/PROVENTIL HFA/VENTOLIN HFA) 108 (90 Base) MCG/ACT inhaler Inhale 2 puffs into the lungs every 6 hours as needed for shortness of breath / dyspnea or wheezing (Patient not taking: Reported on 12/2/2021) 3 Inhaler 1     Ascorbic Acid (VITAMIN C PO)        IRON PO Take 1 tablet by mouth        Omega-3 Fatty Acids (OMEGA-3 FISH OIL PO)  (Patient not taking: Reported on 12/2/2021)       Prenatal Vit-Fe Fumarate-FA (PRENATAL MULTIVITAMIN W/IRON) 27-0.8 MG tablet Take 1 tablet by mouth daily       vitamin  B complex with vitamin C (VITAMIN  B COMPLEX) TABS Take 1 tablet by mouth daily        VITAMIN D, CHOLECALCIFEROL, PO Take by mouth daily        VITAMIN E COMPLEX PO  (Patient not taking: Reported on 12/2/2021)         ALLERGIES     Allergies   Allergen Reactions     Penicillins Rash and Hives     Ampicillin Sodium      Other [No Clinical Screening - See Comments]      Allergic to corn     Lactalbumin Other (See Comments)     Bloated, constipated       PAST MEDICAL HISTORY:  Past Medical History:   Diagnosis Date     Abnormal Pap smear of cervix      Allergic rhinitis      Cervical high risk HPV (human papillomavirus) test positive 05/18/2010     Depression      Depressive disorder      History of kidney stones      HPV (human papilloma virus) infection      Intermittent asthma      PVC's (premature ventricular contractions)        PAST SURGICAL HISTORY:  Past Surgical History:   Procedure Laterality Date     COLPOSCOPY      early 20's after an abnormal pap smear     EXTRACTION(S) DENTAL       ORTHOPEDIC SURGERY       WISDOM TOOTH EXTRACTION         FAMILY HISTORY:  Family History   Problem Relation Age of Onset     Cancer Mother 63        Salivary gland     Hypertension Father      Heart Disease Brother      No Known Problems Maternal Grandmother      Unknown/Adopted  Maternal Grandfather      Kidney Disease Maternal Grandfather      Cerebrovascular Disease Paternal Grandmother      C.A.D. Paternal Grandfather         MI     Heart Disease Paternal Grandfather      No Known Problems Son      No Known Problems Maternal Aunt      No Known Problems Maternal Uncle      No Known Problems Paternal Aunt      No Known Problems Paternal Uncle      Diabetes No family hx of      Breast Cancer No family hx of      Cancer - colorectal No family hx of        SOCIAL HISTORY:  Social History     Socioeconomic History     Marital status:      Spouse name: Yasmany Morejon     Number of children: 1     Years of education: Not on file     Highest education level: Master's degree (e.g., MA, MS, Kathleen, MEd, MSW, LARISA)   Occupational History     Occupation: Bank Teller     Employer: [employer]   Tobacco Use     Smoking status: Former Smoker     Packs/day: 0.30     Years: 6.00     Pack years: 1.80     Types: Cigarettes     Smokeless tobacco: Never Used     Tobacco comment: 1-2 cigarettes per week   Substance and Sexual Activity     Alcohol use: Not Currently     Comment: social     Drug use: No     Sexual activity: Yes     Partners: Male   Other Topics Concern     Parent/sibling w/ CABG, MI or angioplasty before 65F 55M? No   Social History Narrative    Getting  in August 2013.     No honeymoon planned yet     Social Determinants of Health     Financial Resource Strain: Not on file   Food Insecurity: Not on file   Transportation Needs: Not on file   Physical Activity: Not on file   Stress: Not on file   Social Connections: Not on file   Intimate Partner Violence: Not on file   Housing Stability: Not on file       Review of Systems:  Skin:          Eyes:         ENT:         Respiratory:          Cardiovascular:         Gastroenterology:        Genitourinary:         Musculoskeletal:         Neurologic:         Psychiatric:         Heme/Lymph/Imm:         Endocrine:           Physical  Exam:  Vitals: LMP 10/09/2021     Constitutional:  cooperative, alert and oriented, well developed, well nourished, in no acute distress        Skin:  warm and dry to the touch, no apparent skin lesions or masses noted          Head:  normocephalic, no masses or lesions        Eyes:  pupils equal and round        Lymph:      ENT:  no pallor or cyanosis        Neck:  JVP normal        Respiratory:  clear to auscultation         Cardiac: regular rhythm                pulses full and equal                                      2/6 DANIELA    GI:           Extremities and Muscular Skeletal:  no edema              Neurological:  no gross motor deficits        Psych:  Alert and Oriented x 3        CC  Ning Salas CNM  [address]  [address]  [place] [address]

## 2022-03-31 ENCOUNTER — OFFICE VISIT (OUTPATIENT)
Dept: CARDIOLOGY | Facility: CLINIC | Age: 32
End: 2022-03-31
Payer: COMMERCIAL

## 2022-03-31 VITALS
DIASTOLIC BLOOD PRESSURE: 65 MMHG | WEIGHT: 189.2 LBS | SYSTOLIC BLOOD PRESSURE: 105 MMHG | HEIGHT: 69 IN | HEART RATE: 94 BPM | BODY MASS INDEX: 28.02 KG/M2

## 2022-03-31 DIAGNOSIS — R55 PRE-SYNCOPE: ICD-10-CM

## 2022-03-31 PROCEDURE — 99204 OFFICE O/P NEW MOD 45 MIN: CPT | Performed by: INTERNAL MEDICINE

## 2022-03-31 PROCEDURE — 93000 ELECTROCARDIOGRAM COMPLETE: CPT | Performed by: INTERNAL MEDICINE

## 2022-03-31 NOTE — LETTER
3/31/2022    [facility]  [address]  [place] [address]    RE: Margarita OCHOA Persons       Dear Colleague,     I had the pleasure of seeing Margarita OCHOA Persons in the [facility].  HPI and Plan:                         CURRENT MEDICATIONS:  Current Outpatient Medications   Medication Sig Dispense Refill     albuterol (PROAIR HFA/PROVENTIL HFA/VENTOLIN HFA) 108 (90 Base) MCG/ACT inhaler Inhale 2 puffs into the lungs every 6 hours as needed for shortness of breath / dyspnea or wheezing (Patient not taking: Reported on 12/2/2021) 3 Inhaler 1     Ascorbic Acid (VITAMIN C PO)        IRON PO Take 1 tablet by mouth        Omega-3 Fatty Acids (OMEGA-3 FISH OIL PO)  (Patient not taking: Reported on 12/2/2021)       Prenatal Vit-Fe Fumarate-FA (PRENATAL MULTIVITAMIN W/IRON) 27-0.8 MG tablet Take 1 tablet by mouth daily       vitamin  B complex with vitamin C (VITAMIN  B COMPLEX) TABS Take 1 tablet by mouth daily        VITAMIN D, CHOLECALCIFEROL, PO Take by mouth daily        VITAMIN E COMPLEX PO  (Patient not taking: Reported on 12/2/2021)         ALLERGIES     Allergies   Allergen Reactions     Penicillins Rash and Hives     Ampicillin Sodium      Other [No Clinical Screening - See Comments]      Allergic to corn     Lactalbumin Other (See Comments)     Bloated, constipated       PAST MEDICAL HISTORY:  Past Medical History:   Diagnosis Date     Abnormal Pap smear of cervix      Allergic rhinitis      Cervical high risk HPV (human papillomavirus) test positive 05/18/2010     Depression      Depressive disorder      History of kidney stones      HPV (human papilloma virus) infection      Intermittent asthma      PVC's (premature ventricular contractions)        PAST SURGICAL HISTORY:  Past Surgical History:   Procedure Laterality Date     COLPOSCOPY      early 20's after an abnormal pap smear     EXTRACTION(S) DENTAL       ORTHOPEDIC SURGERY       WISDOM TOOTH EXTRACTION         FAMILY  HISTORY:  Family History   Problem Relation Age of Onset     Cancer Mother 63        Salivary gland     Hypertension Father      Heart Disease Brother      No Known Problems Maternal Grandmother      Unknown/Adopted Maternal Grandfather      Kidney Disease Maternal Grandfather      Cerebrovascular Disease Paternal Grandmother      C.A.D. Paternal Grandfather         MI     Heart Disease Paternal Grandfather      No Known Problems Son      No Known Problems Maternal Aunt      No Known Problems Maternal Uncle      No Known Problems Paternal Aunt      No Known Problems Paternal Uncle      Diabetes No family hx of      Breast Cancer No family hx of      Cancer - colorectal No family hx of        SOCIAL HISTORY:  Social History     Socioeconomic History     Marital status:      Spouse name: Yasmany Morejon     Number of children: 1     Years of education: Not on file     Highest education level: Master's degree (e.g., MA, MS, Kathleen, MEd, MSW, LARISA)   Occupational History     Occupation: Bank Teller     Employer: [employer]   Tobacco Use     Smoking status: Former Smoker     Packs/day: 0.30     Years: 6.00     Pack years: 1.80     Types: Cigarettes     Smokeless tobacco: Never Used     Tobacco comment: 1-2 cigarettes per week   Substance and Sexual Activity     Alcohol use: Not Currently     Comment: social     Drug use: No     Sexual activity: Yes     Partners: Male   Other Topics Concern     Parent/sibling w/ CABG, MI or angioplasty before 65F 55M? No   Social History Narrative    Getting  in August 2013.     No honeymoon planned yet     Social Determinants of Health     Financial Resource Strain: Not on file   Food Insecurity: Not on file   Transportation Needs: Not on file   Physical Activity: Not on file   Stress: Not on file   Social Connections: Not on file   Intimate Partner Violence: Not on file   Housing Stability: Not on file       Review of Systems:  Skin:          Eyes:         ENT:          Respiratory:          Cardiovascular:         Gastroenterology:        Genitourinary:         Musculoskeletal:         Neurologic:         Psychiatric:         Heme/Lymph/Imm:         Endocrine:           Physical Exam:  Vitals: LMP 10/09/2021     Constitutional:  cooperative, alert and oriented, well developed, well nourished, in no acute distress        Skin:  warm and dry to the touch, no apparent skin lesions or masses noted          Head:  normocephalic, no masses or lesions        Eyes:  pupils equal and round        Lymph:      ENT:  no pallor or cyanosis        Neck:  JVP normal        Respiratory:  clear to auscultation         Cardiac: regular rhythm                pulses full and equal                                      2/6 DANIELA    GI:           Extremities and Muscular Skeletal:  no edema              Neurological:  no gross motor deficits        Psych:  Alert and Oriented x 3        CC  Ning Salas, [facility]  [address] [address]    Thank you for allowing me to participate in the care of your patient.      Sincerely,     Elena Smith MD     [facility]

## 2022-03-31 NOTE — PROGRESS NOTES
Service Date: 03/31/2022    REASON FOR CONSULTATION:  Palpitations, presyncope.    HISTORY OF PRESENT ILLNESS:  I had the pleasure of seeing Ms. Roper [facility].  She is a very pleasant 31-year-old female who presents today due to concerns regarding palpitations and a few episodes of presyncope last week.  She is currently 25 weeks into her second pregnancy.    The patient states that she has a history of PVCs and has actually undergone a cardiac workup at [facility] in 2015.  The records available include an echocardiogram, which was essentially normal.  No specific therapy was recommended at the time, and the patient's symptoms resolved spontaneously.  She subsequently underwent a repeat echocardiogram in 09/2020, which was also completely normal.    The patient states that in the past week she has had 3 episodes of lightheadedness that did not progress to peter syncope.  She also noted palpitations at the time and was seen was seen for a prenatal office visit on 03/25/2022, at which point a Cardiology consultation was recommended.  She did undergo extensive blood work that day which was essentially normal with the exception of mild anemia with a hemoglobin of 11.6.    The patient is a very active individual who actually is still performing yoga on a daily basis and prior to her pregnancy was participating in [facility] [employer] classes on a regular basis.  She denies any history of exertional chest discomfort, palpitations, syncope or presyncope.  She denies any PND or orthopnea.    Her past medical history, family history, social history, allergies and medications are reviewed in my Epic note.    PHYSICAL EXAMINATION:  Dictated below.    I personally reviewed her EKG obtained today, which demonstrated normal sinus rhythm at a rate of 78 beats per minute with normal axis and intervals.    As stated above, I reviewed her echocardiogram from 09/30/2020, which  reported normal biventricular size and systolic function with no significant valvular disease.    IMPRESSION:    1.  Palpitations in association with presyncope.  The patient has previously been diagnosed with symptomatic PVCs.    Ms. Roper presents for an evaluation regarding palpitations and episodes of presyncope as described above.  I think it is reasonable to perform a repeat echocardiogram for systolic function/valvular reassessment.  I have also ordered a 2-week event monitor to precisely delineate the nature of her symptoms.    We also discussed management approaches if she is found to have symptomatic PVCs.  At this point in time, my inclination would be to try to avoid any pharmacological interventions if the patient is minimally symptomatic.  We can certainly reassess this approach if her symptoms worsen.    We also discussed moderation of caffeine intake and appropriate hydration.    It was a pleasure seeing her in consultation today.    Elena Smith MD        D: 2022   T: 2022   MT: erika    Name:     BRIANNE ROPER  MRN:      [id number]        Account:      [id number]   :      1990           Service Date: 2022       Document: [id number]

## 2022-04-05 ENCOUNTER — HOSPITAL ENCOUNTER (OUTPATIENT)
Dept: CARDIOLOGY | Facility: CLINIC | Age: 32
Discharge: HOME | End: 2022-04-05
Attending: INTERNAL MEDICINE | Admitting: INTERNAL MEDICINE
Payer: COMMERCIAL

## 2022-04-05 DIAGNOSIS — R55 PRE-SYNCOPE: ICD-10-CM

## 2022-04-05 PROCEDURE — 93272 ECG/REVIEW INTERPRET ONLY: CPT | Performed by: INTERNAL MEDICINE

## 2022-04-05 PROCEDURE — 93270 REMOTE 30 DAY ECG REV/REPORT: CPT

## 2022-04-07 ENCOUNTER — DOCUMENTATION ONLY (OUTPATIENT)
Dept: CARDIOLOGY | Facility: CLINIC | Age: 32
End: 2022-04-07
Payer: COMMERCIAL

## 2022-04-07 NOTE — PROGRESS NOTES
14 day biotel event monitor to check palpitations and PVCs    biotel strip 4/5/2022 @ 15:53 baseline showing sinus rhythm @ 91 BPM noted.    biotel strp 4/5/2022 @ 19:58 showing sinus rhythm @ 76 BPM. Patient pushed the button for skipped beat and heart racing.    Strips placed in event folder

## 2022-04-11 NOTE — PROGRESS NOTES
biotel strip 4/8/2022 @ 11:47 showing sinus rhythm with atrial couplets @ 98 BPM noted. Patient pushed the button for skipped beat and heart racing    biotel strip 4/8/2022 showing sinus tachycardia @ 98 BPM noted. Patient pushed the button for heart racing.    Strips placed in event folder

## 2022-04-12 NOTE — PROGRESS NOTES
Strip received for 4/10/22 at 18:50:55 showing sinus rhythm HR 86bpm. Pt was doing light activity and reported feelings of a racing heart. Filed to event folder.

## 2022-04-13 ENCOUNTER — TELEPHONE (OUTPATIENT)
Dept: MIDWIFE SERVICES | Facility: CLINIC | Age: 32
End: 2022-04-13
Payer: COMMERCIAL

## 2022-04-13 DIAGNOSIS — R10.2 PAIN OF PELVIC GIRDLE: Primary | ICD-10-CM

## 2022-04-13 NOTE — PROGRESS NOTES
biotel strip 4/11/2022 showing sinus tachycardia @ 105 BPM notes. Patient pushed the button for lightheadedness.    Strip placed in event folder

## 2022-04-13 NOTE — TELEPHONE ENCOUNTER
"Margarita contacted on-call CNM regarding abdominal and lower back pain.     Subjective:  Margarita calls with concerns of increasing pelvic pain for the past 2 weeks. Began with pain overnight when trying to change sleeping positions. This has worsened from night to night and now affects her during the day, making it difficult to walk. 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    She describes that her pelvis feels \"scrambled\" when she tries to move overnight in bed and that her pelvis feels \"wiggly\" when standing. Notes that her pain extends from her vagina to her rectum and wraps around to lower abdomen and back. Movement tends to worsen pain and sitting position improves it. She has not tried any acetaminophen or abdominal supports to improve pain.    Reports that fetal movement is normal for her. Denies leaking fluid or bleeding. Denies headache, vision changes, or epigastric pain.      Objective:  Clear-spoken, calm over the phone    Assessment:  Pelvic girdle pain    Plan:  - Abdominal support binder ordered, pt to  and be fitted for binder at DME site.  - Offered earlier clinic visit for physical evaluation, which the patient DECLINES.   - Plan to reevaluate at next appointment to determine if abdominal binder is helpful; consider physical therapy if further support is needed.    Patient was seen with student, JEFF Allen who was present for learning. I personally assessed, examined and made clinical decisions reflected in the documentation.     "

## 2022-04-20 ENCOUNTER — TELEPHONE (OUTPATIENT)
Dept: CARDIOLOGY | Facility: CLINIC | Age: 32
End: 2022-04-20
Payer: COMMERCIAL

## 2022-04-20 NOTE — TELEPHONE ENCOUNTER
[facility]    Phone Message    May a detailed message be left on voicemail: yes     Reason for Call: Requesting Results   Name/type of test: Heart monitor results   Date of test: 04/05/2022  Was test done at a location other than [facility] no     Patient would like to speak with care team regarding her results. Please call patient to discuss further         Action Taken: Message routed to:  [facility] ([facility] Cardio     Travel Screening: Not Applicable

## 2022-04-20 NOTE — PROGRESS NOTES
Elena Smith MD  You 8 minutes ago (2:10 PM)        Let's wait for the echo results. Thanks.         Called patient to review monitor results, left message to call back.     Addendum 1500: Spoke to patient and reviewed monitor findings. Pt verbalized understanding, no further questions. Will follow up again after echo report is available.

## 2022-04-21 ENCOUNTER — PRENATAL OFFICE VISIT (OUTPATIENT)
Dept: MIDWIFE SERVICES | Facility: CLINIC | Age: 32
End: 2022-04-21
Payer: COMMERCIAL

## 2022-04-21 VITALS
HEIGHT: 69 IN | WEIGHT: 193 LBS | BODY MASS INDEX: 28.58 KG/M2 | SYSTOLIC BLOOD PRESSURE: 112 MMHG | HEART RATE: 80 BPM | DIASTOLIC BLOOD PRESSURE: 58 MMHG

## 2022-04-21 DIAGNOSIS — O26.899 RH NEGATIVE STATE IN ANTEPARTUM PERIOD: ICD-10-CM

## 2022-04-21 DIAGNOSIS — R10.2 PAIN OF PELVIC GIRDLE: ICD-10-CM

## 2022-04-21 DIAGNOSIS — Z34.80 SUPERVISION OF OTHER NORMAL PREGNANCY: Primary | ICD-10-CM

## 2022-04-21 DIAGNOSIS — Z67.91 RH NEGATIVE STATE IN ANTEPARTUM PERIOD: ICD-10-CM

## 2022-04-21 LAB
ANTIBODY SCREEN: NEGATIVE
GLUCOSE 1H P 50 G GLC PO SERPL-MCNC: 110 MG/DL (ref 70–129)
HGB BLD-MCNC: 11.6 G/DL (ref 11.7–15.7)
SPECIMEN EXPIRATION DATE: NORMAL
T PALLIDUM AB SER QL: NONREACTIVE

## 2022-04-21 PROCEDURE — 36415 COLL VENOUS BLD VENIPUNCTURE: CPT | Performed by: ADVANCED PRACTICE MIDWIFE

## 2022-04-21 PROCEDURE — 96372 THER/PROPH/DIAG INJ SC/IM: CPT | Performed by: ADVANCED PRACTICE MIDWIFE

## 2022-04-21 PROCEDURE — 85018 HEMOGLOBIN: CPT | Performed by: ADVANCED PRACTICE MIDWIFE

## 2022-04-21 PROCEDURE — 99207 PR PRENATAL VISIT: CPT | Performed by: ADVANCED PRACTICE MIDWIFE

## 2022-04-21 PROCEDURE — 82950 GLUCOSE TEST: CPT | Performed by: ADVANCED PRACTICE MIDWIFE

## 2022-04-21 PROCEDURE — 86780 TREPONEMA PALLIDUM: CPT | Performed by: ADVANCED PRACTICE MIDWIFE

## 2022-04-21 PROCEDURE — 86850 RBC ANTIBODY SCREEN: CPT | Performed by: ADVANCED PRACTICE MIDWIFE

## 2022-04-21 NOTE — NURSING NOTE
The following medication was given:     MEDICATION:  RhoGam 300 ug  ROUTE: IM  SITE: Deltoid - Right  DOSE: 300ug  LOT #: [id number]  : Actifio  EXPIRATION DATE: 08/27/2023  NDC#: 2521-7122-19   Was there drug waste? No  Multi-dose vial: No    Devin Guaman CMA  April 21, 2022  
26-Jun-2019

## 2022-04-21 NOTE — PROGRESS NOTES
Margarita presents to the clinic today with her  Yasmany.  Feeling much better!  Seen by cardiology; EKG and Holter monitoring WNL.  Echocardiogram scheduled May 13, 2022.  Pregravid BMI 26 with total weight gain at 17 pounds thus far slightly above recommended.  Taking prenatal vitamin and oral iron supplementation as directed.  Baby is active, and she denies regular uterine contractions, loss of fluid or vaginal bleeding.  Labs today: 1 hour GCT, hemoglobin, antibody screen, RPR.  Rhophylac given.  Written information given regarding  birth, kick count chart.   labor precautions and danger signs and symptoms reviewed.  All questions answered.  Encouraged daily fetal movement counting and to call or return to clinic with any questions, concerns, or as needed.

## 2022-05-05 NOTE — PROGRESS NOTES
End of service summary received for event monitor, reviewed and signed by Dr Smith and sent to scan. Echo scheduled for 5/13, will follow up with patient pending those results.

## 2022-05-18 NOTE — PATIENT INSTRUCTIONS
"[facility] Nurse Midwives [facility]  - Contact information:  Appointment line and to get a hold of CNM in clinic Monday-Friday 8 am - 5 pm:  [phone].  There are some clinics with early start times (1st appointment 7:40 am) and others with evening hours (last appointment 6:20 pm).  Most are typically open from 8 am to 5 pm.    CNM on call answering service: [phone].  Specify your hospital of choice and leave a brief message for CNM;  will then page CNM who is on call at your specified hospital and you should receive a call back with 15 minutes.  Be sure that your ringer is audible and that you can accept blocked calls so that we can get back in touch with you! This number should be reserved for urgent needs if during the day, before 8 am, after 5 pm, weekends, holidays.    Contact the on-call CNM with warning signs, such as:  vaginal bleeding   Vaginal discharge and itching or pain and burning during urination  Leg/calf pain or swelling on one side  severe abdominal pain  nausea and vomiting more than 4-5 times a day, or if you are unable to keep anything down  fever more than 100.4 degrees F.     Analizahart  After each of your visits you are welcome to check Zoombu for your visit summary including education and links to information relevant to your pregnancy and/or well woman care.   Find the \"Visits\" tab at the top of the page, you will see a list of recent visits and for each visit a for link for \"View After Visit Summary.\" View of your After Visit Summary will allow you to read our recommendations from your visit, review any education provided, and link to websites with useful information.   If you have any questions or difficulty navigating CrowdRise, please feel free to contact us and we will do our best to direct you.  Meet the Midwives from [facility]  You are invited to an informational meet and greet with [facility]'s [facility] Certified Nurse-Midwives. Our " NN/OSN Encounter Summary       NN/OSN Assessment    NN/OSN Assessments:  Incoming call from patient after NN left VM for patient to return call.  Provided patient information from [place]/ [place] liaison to patient on his benefit updates regarding coverage for his Dexcom diabetic supplies. Insurance is reprocessing claim and benefits.    Patient verbalized understanding and appreciated update.    No other questions or concerns a this time. NN will update patient as information is available.       Acute Pain Assessment    Acute Pain:  No       Goals Addressed     None        NN/OSN Interventions    Collaboration/Communication:  Yes         Other         Outcomes Achieved:  Resolve Benefits Issue(s)  Interventions Narrative:  Updated patient on claims/benefits         Charles Mendes RN, BSN  [facility]  Nurse Navigator - Population Health  Telephone:  [phone]  E-Mail: [email or web address]   "free \"Meet the Midwives\" event is a great opportunity to learn about our midwives' philosophy and experience, the hospitals where we can assist with your birth, and answer questions you may have. Partners, friends, and family are welcome to attend. Currently, this is a virtual event.  Date  First Tuesday of every month at 7 pm.    Link to next (live) meeting  https://www.BigMachines/classes-and-events/meet-the-midwives-from-[facility]  To Join by Telephone (audio only) Call:   [phone] Phone Conference ID: [phone]#      Touring the Maternity Care Center  At this time we are offering a virtual tour of the Maternity Care Centers at both [facility] and [facility]:   [facility]: https://www.BigMachines/Locations/[facility]/Maternity-Care-Center  [place]:   https://BigMachines/overarching-[facility]/the-birthplace/tours  https://www.BigMachines/Locations/[facility]/Maternity-Care-Center/#virtual_tour  When in person tours become available, registrations is required. To schedule a tour at either [place] or [facility], please do so online using the following links:  [facility] - [email or web address]://www.Audley [employer].CFO.com/registerlist.asp?s=[email or web address]&[email or web address]&vs=5&p=[email or web address]&ydlut=005&ps=1&group=37&it=1&jnl=387  [facility] - https://www.Audley Travel.CFO.com/registerlist.asp?s=[email or web address]&m=303&vs=5&p=[email or web address]&hyzbo=370&ps=1&group=38&it=1&rgu=738      Pre-registration for Hospital Stay:  Sometime betweeen 30-37 weeks, it is recommended that you \"pre-register\" for your upcoming hospital stay on our website:    [email or web address]://sslforms.GridGain Systems.org/preregistration/he.asp    Breastfeeding and Birth Control  How do I decide what birth control method is best for me while I am breastfeeding?  Choosing a method of birth control is very personal. First, answer the following questions:     Do you want to have more children?   How much spacing between births do you want for your children?   Do you smoke or have you " had any health problems, such as liver disease or a blood clot?  Talk about the answers to each of these questions with your health care provider to help you choose the best method for you.    Can I use breastfeeding as my birth control?  Using breastfeeding as your birth control (the lactational amenorrhea method) can be a good way to keep from getting pregnant in the first months after the baby is born. Each time your baby nurses, your body releases a hormone called prolactin, which stops your body from making the hormones that cause you to ovulate (release an egg). If you are not ovulating, you cannot get pregnant.    The lactational amenorrhea method works only if:   you have not started your period yet.   you are breastfeeding only and not giving your baby any other food or drink.   you are breastfeeding at least every 4 hours during the day and every 6 hours at night.   your baby is less than 6 months old.  When any 1 of these 4 things is not happening, you no longer have good protection from getting pregnant, and you should use another form of birth control.    What birth control methods are safe for me to use while I breastfeed?    Methods without hormones  Methods without hormones do not affect you, your baby, or your breastfeeding.  Methods without hormones that are the most effective   The copper intrauterine contraceptive device (IUD) (ParaGard) is a small, T-shaped device that is in- serted into your uterus (womb) through the vagina and cervix. The copper IUD lasts for 10 years.   Sterilization (getting your tubes tied or your partner having a vasectomy) is very effective, but it is per- manent. You should choose sterilization only if you do not want to have more children.  A method without hormones that is effective   The lactational amenorrhea method described above is effective for the first 6 months.  Methods without hormones that are less effective   Natural family planning is monitoring your body  for signs of ovulation and not having sex when you think you are ovulating. This method is reliable only if you are having regular periods every month.   Barrier methods (condoms, diaphragms, sponges, and spermicides) are used at the time you have sex. These methods are effective only if you use them correctly every time.    Methods with hormones  Birth control methods that use hormones can be used while you are breastfeeding. They may have a small effect on lowering the amount of milk you make. All hormones will get into your breast milk in very small amounts, but there is no known harm to your baby from this small amount of hormone in breast milk.only methodsmethods use only 1 hormone, called progestin. You can start them right after your baby is born or wait 4 to 6 weeks to make sure your milk supply is good.   Progestin-only pills ( minipills ): If you like to take pills every day, you can use the minipill. In order forpill to work well, you have to take 1 at the same time each day. When you stop breastfeeding, you should start pills that have both estrogen and progestin because they are better at keeping you from get- ting pregnant.   Progestin IUD (Mirena): The progestin IUD is shaped and inserted into the uterus like the copper IUD. It works for up to 5 years. Both IUDs are usually inserted 4 to 6 weeks after the baby is born.  Progestin implant (Nexplanon): The progestin implant is a small matchstick-sized flexible jaydon. It is placed into the fatty tissue in the back of your arm. It works for up to 3 years.  Progestin shot (Depo-Provera): The progestin shot is given every 3 months.    estrogen and progestin methods  These methods use 2 hormones, called estrogen and progestin.     These methods increase your risk of a blood clot, which is already higher than normal after you have a baby. You should not use them until your baby is at least 6 weeks old. The combined methods are not recommended as the first  choice for women who are breastfeeding. If a combined method is the one that you feel will be best for you to prevent getting pregnant, these methods are okay to use while breastfeeding.   Combined birth control pills: You take a pill each day.  Vaginal ring (NuvaRing): The ring is worn in the vagina for 3 weeks then left out for 1 week before youin a new ring.  Patch (Ortho Evra): The patch is placed on your skin and changed every week for 3 weeks then left off forweek before putting a new patch on a different area of your skin.    Pediatric Care Providers at [facility]:    Choosing the right provider is one of the most important decisions you ll make about your health care. We can help you find the right one. Remember, you re looking for a provider you can trust and work with to improve your health and well-being, so take time to think about what you need. Depending on how complicated your health care needs are, you may need to see more than one type of provider.    Primary Care Providers: You ll see a primary care provider first for most health issues. They ll work with you to get your recommended screenings, help you manage chronic conditions, and refer you to other types of providers if you need them. Your primary care provider may be called a family physician or doctor, internist, general practitioner, nurse practitioner, or physician s assistant. Your child or teenager s provider may be called a pediatrician.  Specialists: You ll see a specialist for certain services or to treat specific conditions. Specialists include cardiologists, oncologists, psychologists, allergists, podiatrists, and orthopedists. You may need a referral from your primary care provider before you go to a specialist in order for your health plan to pay for your visit.\robdHere are some tips for finding a provider where you live:  If you already have a provider you like and want to keep working with, call  their office and ask if they accept your coverage.  Call your insurance company or state Medicaid and CHIP program. Look at their website or check your member handbook to find providers in your network who take your health coverage.  Ask your friends or family if they have providers they like and use these tools to compare health care providers in your area.    Family Medicine at  [facility] Nurse Midwives [facility]:     https://www.[email or web address].org/specialties/family-medicine    Many of our families enjoy all seeing the same doctor, who comes to know the whole family very well. We base our practice on the knowledge of the patient in the context of family and community.  WHY CHOOSE A FAMILY MEDICINE PHYSICIAN?  Ability of the whole family to see the same doctor  Focus on the whole person, including physical and emotional health  A personal relationship with their doctor that is nurtured over time  Respect for individual and family beliefs and values  No need to change primary care providers when a certain age is reached  Coordination of care when other health care services are needed    Pediatrics at  [facility] Nurse [facility]wives [facility]:   https://www.[email or web address]/specialties/pediatric-care    Through a teaching affiliation with the [facility], [facility] staff keeps current on new developments in the field of pediatrics.     Everything we do centers around caring for children. We place special emphasis on wellness and prevention.pediatric care team includes a team of pediatricians and certified nurse practitioners who provide care to pediatric and adolescent patients ages 0 to 18, and some up to the age of 26. We offer preventive health maintenance for healthy children as well the diagnosis and treatment of common and chronic illnesses and injuries. In addition, we also offer several pediatric specialists who focus on adolescent health issues and developmental and behavioral  issues.    Circumcision    Educational video:     [email or web address]    What is circumcision?  At birth, baby boys have loose skin that covers the head of the penis. This skin is called the foreskin. When all or part of the foreskin of the penis is cut off, this is called circumcision.  Why is circumcision done?  Circumcision is done for many reasons including Anabaptism, cultural, looks, and health. Some Anabaptism groups circumcise all boys as a arias-based practice. Many people in the [place] choose to circumcise their baby boys because they believe it is culturally normal. It is not a common practice in [place], [place], or [place].  Some parents choose circumcision so that their son will have a penis that looks like his father s if the father was also circumcised. Other people choose circumcision because they believe it is  or will protect the boy or man from infection or cancer later in life.  Does circumcision protect against infection or cancer?  Circumcision does seem to protect against some types of infection or cancer. Cancer of the penis is one type of cancer that circumcision may prevent. However, cancer of the penis is very rare. One hundred thousand circumcisions would need to be done to prevent one case of cancer of the penis. Circumcision may also decrease the chance of some sexually transmitted infections, such as HIV and human papilloma virus (HPV). See the next page for more information on the risks and benefits of circumcision.  What happens during a circumcision?  Babies born in the hospital are usually circumcised before they go home. Health care providers also perform circumcisions in their offices and clinics within a few weeks after birth.  Evangelical circumcisions are most often done at home or in a [facility].  Before the circumcision is performed, some providers give an injection (shot) of a small amount of anesthetic (numbing  medicine) at the base of the penis to block the pain or put an anesthetic cream on the penis to numb the area that will be cut.  There are 2 different ways to do a circumcision. In one type, a clamp placed around the head of the penis cuts off the blood supply to the foreskin, and the foreskin above the clamp is cut off. The clamp is left on the penis until the area heals and it falls off a few days later. In another type of circumcision, the foreskin is cut off with scissors or a scalpel.  After the circumcision, petroleum jelly and sometimes gauze may be put over the area of the penis where the skin was removed. This protects the end of the penis while it heals.  Can I keep my son s penis  if it is circumcised?  Regular washing with soap and water will keep any penis clean. Circumcision does not make the penis . Uncircumcised boys do need to be taught to clean beneath their foreskin, just like they need to be taught to wash their hands or brush their teeth.  How do I decide if I should have my son circumcised?  The American Academy of Pediatrics (AAP) says that  circumcision may have health benefits. They do not recommend circumcision for all boys as a routine procedure. The AAP recommends that you talk to your health care provider to decide if circumcision is the right choice for your family. You may also wish to discuss the question with your family or .  What are the risks and benefits of circumcision?  We do not have a lot of good scientific information about the health risks or benefits of circumcision.  Possible Risks:  Very few baby boys (less than 1 in 100) will have a problem after circumcision, such as bleeding or mild infection of the penis. These problems are usually not serious and are easy to treat.  Less common problems are:   Removal of too much or too little foreskin  Some rare problems are:   Narrowing of the opening of the penis, which can cause problems  with urination   Removal of part of the penis or death of some of the other skin on the penis  Infection that spreads to other parts of the body  People used to think babies did not really feel pain. Now we know that they do. Many baby boys appear to feel a lot of pain during circumcision if anesthesia is not used.  We do not know if circumcision affects sexual function or sensation.  Possible Benefits:   Less risk for some kinds of cancers, like cancer of the penis   Fewer urinary tract (bladder or kidney) infections for babies   Less risk for some sexually transmitted infections, such as HIV, herpes, and HPV    May protect female sexual partners from some sexually transmitted infections    For More Information  American Academy of Family Physicians: Circumcision  http://familydoctor.org/familydoctor/en/pregnancy-newborns/caring-for-newborns/infant- care/circumcision.html  MedlinePlus: Circumcision (includes a slide show on the procedure)  www.nlm.nih.gov/medlineplus/circumcision.html  American Academy of Pediatrics: Policy statement on circumcision  Http://pediatrics.aappublications.org/content/130/3/e756.abstract      Childbirth and Parenting Education:         Everyday Miracles:   https://www.everyday-miracles.org/    Free Video Series from [facility]: https://nursing.[facility]/academics/specialty-areas/nurse-midwifery/having-baby-prenatal-videos/having-baby-prenatal-and    JAMIN parenting Pierce: http://Sinai-Grace HospitalIndicee.ClearMyMail/   ([phone]  [place]: (education, yoga & wellness) www.Gro.ClearMyMail  Enlightened Mama: www.enlightenedmama.com   Childbirth collective: (Parent topic nights)  www.childbirthcollective.org/  Hypnobabies:  www.hypnobabiestwincities.com/  Hypnobirthing:  Http://hypnobirthing.com/  The Birth Hour: https://Revolve.houLodo Software/online-childbirth-class/    APPS and Podcasts:   Luz Mcdermott Nurture    Evidence Based Birth  The Birth Hour (for birth stories)   Birthful   Expectful   The  Longest Shortest Time  PregnancyPodcast Nury Maurice    Book Recommendations:   Nevin Henderson's Birthing From Within--first few chapters include a new-age tone, you may prefer to skip it and keep going, because there is good stuff later.  This book recommendation covers emotional preparation, but does cover coping with pain, and use of both pharmacological and nonpharmacological methods.    Dr. Acevedo' The Pregnancy Book and The Birth Book--the pregnancy book goes month-by month      The Birth Partner by Kathleen Yi    Womanly Art of Breastfeeding by La Leche League International   Bestfeeding by Patricia Shore--great pictures    Mothering Your Nursing Toddler, by Aura Freitas.   Addresses dealing with so many of the challenging behaviors of a nursing toddler.  How Weaning Happens, by La Leche League.  Discusses weaning at all ages, from medically necessary weaning of an infant, all the way up to age 5 (or older), with why/why not, and strategies.  Very empowering book both for deciding to wean and deciding not to.    American College of Nurse-Midwives (ACNM) http://www.midwife.org/; look at the informational handouts at http://www.midwife.org/Share-With-Women     www.mymidwife.org    Mother to Baby (Medication and Herbal guidance in pregnancy): http://www.mothertobaby.org  Toll-Free Hotline: 750.247.8813  LactMed (Medication use while breastfeeding): http://toxnet.nlm.nih.gov/newtoxnet/lactmed.htm    Women's Health.gov:  http://www.womenshealth.gov/a-z-topics/index.html    American pregnancy association - http://americanpregnancy.org    Centering Pregnancy (group prenatal care option): http://centeringhealthcare.org    Information about doulas:  Childbirth collective: http://www.childbirthcollective.org/  Doulas of [place] (VICENTE):  www.vicente.org  EVRST [facility]  project: http://Sunfirecitiesdoulaproject.com/     Early Childhood and Family Education (ECFE):  ECFE offers parents hands-on learning  "experiences that will nourish a lifetime of teachable moments.  http://ecfe.info/ecfe-home/    March of Dimes www.Trans Tasman Resources.QuantHouse     FDA - Nutrition  www.mypyramid.gov  Under \"For Consumers,\" click on \"pregnant and breastfeeding women.\"      Centers for Disease Control and Prevention (CDC) - Vaccines : http://www.cdc.gov/vaccines/       When researching information on the web, question the validity of websites.  The Xtraice .gov, Floxx andAceva Technologiesorg tend to be more reliable information.  If there are a lot of advertisements, be cautious of the information provided. Stay away from blogs and chat rooms please!             Virtual Breastfeeding Support:    During this time of isolation, breastfeeding families need even more community!  Here are some area organizations offering virtual support groups for breastfeeding:    [employer] Support Group, Tuesdays at 10:30 am   Run by BRETT Khan of [employer]   Go to [employer] Facebook page and click on \"events\" for link   https://www.WorkSimple.com/events/297513051341817/  [facility] Milk Hour, Thursdays at 2:30 pm    Run by BRETT Gillespie   Go to [facility] FB page and send message to get link   https://www.WorkSimple.com/healthfoundations/  Delphine Leiva [facility]/[place] holding virtual meetings the first Tuesday of each month, 8-9 pm, and the   Third Saturday, 10 - 11 am.  Go to La Leche [facility] and [place] FB page; message to get link [email or web address]://www.WorkSimple.com/LLLofGoldenValmonico/?hc_location=[facility]  [employer] offers a Lactation Lounge every Friday 12pm - 1pm, run by Delphine Osborne Leader   Sign up via link at OrthoScan/cbe-lactation   https://www.OrthoScan/cbe-lactation  [facility] is offering virtual support groups every Monday, 10:30 am - 12 pm, run by nurse " IBCLC   Https://www.Translimit.com/events/[email or web address]/    Prenatal Breastfeeding Classes:      MoneyHero.com.hk is offering virtual breastfeeding and  care classes:  https://www.[address]education-workshops  BirthEd childbirth and breastfeeding education offering virtual prenatal breastfeeding classes  https://www.[place] Labsmn.AddMyBest/workshops

## 2022-05-18 NOTE — PROGRESS NOTES
Here with Yasmany today for routine prenatal visit at 32w1d. 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 Notes active FM and no regular UCs. Wondering about SPD, currently working with maternity belt and notes improvement with use. Taking iron twice daily, tolerating well. Advised on end of pregnancy tasks and given written information. Considering WB but worried that she won't be allowed to get out if it doesn't feel right; advised she is not committed to WB and may elect any location for birth and we will assist a position change. Plan for PP BCM likely NFP; encouraged to review materials and bring questions. Reviewed her PP supports and leave plan; she plans to take the next year off. Offered breast pump; has one already at home. Anticipates boy; reviewed and provided circ education. Conversation interrupted by severe weather and need to abbreviate visit, review Circ info again. Advised on upcoming labs and visit schedule. Reviewed warning s/sx and reasons to call. RTC 2-3 weeks.

## 2022-05-19 ENCOUNTER — PRENATAL OFFICE VISIT (OUTPATIENT)
Dept: MIDWIFE SERVICES | Facility: CLINIC | Age: 32
End: 2022-05-19
Payer: COMMERCIAL

## 2022-05-19 VITALS
HEART RATE: 92 BPM | WEIGHT: 195.5 LBS | SYSTOLIC BLOOD PRESSURE: 94 MMHG | BODY MASS INDEX: 28.96 KG/M2 | HEIGHT: 69 IN | DIASTOLIC BLOOD PRESSURE: 62 MMHG

## 2022-05-19 DIAGNOSIS — Z34.80 SUPERVISION OF OTHER NORMAL PREGNANCY: ICD-10-CM

## 2022-05-19 PROCEDURE — 99207 PR PRENATAL VISIT: CPT | Performed by: ADVANCED PRACTICE MIDWIFE

## 2022-06-12 NOTE — PROGRESS NOTES
Here with Alma Rosa for a routine prenatal visit at 35w5d. Reports normal fetal movements.  She is feeling some moderately intense contractions that started when she woke up this morning.  She states they seem to be coming every 5 minutes.  She denies vaginal leaking.  She is noticing some whitish vaginal discharge this morning that seems new.  Denies bleeding or bloody show.  Did have sex last night.  Admits they have been running around with back-to-back appointments this morning so she has not rested at all.  States in the past when she gets painful contractions resting in a warm bath usually resolve them.  Denies a history of  labor in this pregnancy or previous pregnancy.  Accepts UA/UC, wet prep, GBS today.  FFn not collected due to recent intercourse.  Cervical exam performed as noted in flowsheet.  Discussed my concern for frequent painful  contractions and [place] [facility] [place]  Patient declines evaluation on maternity at this time and thinks if she goes home and takes a warm bath her contractions will resolve.  We will plan to go right home from here, 25-minute drive.  Instructed to call on-call CNM if contractions persist despite hydration, rest and a warm bath.  Patient agrees with plan. Handouts given regarding GBS, breast-feeding and postpartum depression/anxiety and wellbeing.  labor precautions and danger signs and symptoms reviewed.  All questions answered.  Encouraged daily fetal movement counting and to call or return to clinic with any questions, concerns, or as needed.  Plan hgb next visit.

## 2022-06-13 ENCOUNTER — MYC MEDICAL ADVICE (OUTPATIENT)
Dept: MIDWIFE SERVICES | Facility: CLINIC | Age: 32
End: 2022-06-13

## 2022-06-13 ENCOUNTER — PRENATAL OFFICE VISIT (OUTPATIENT)
Dept: MIDWIFE SERVICES | Facility: CLINIC | Age: 32
End: 2022-06-13
Payer: COMMERCIAL

## 2022-06-13 VITALS
DIASTOLIC BLOOD PRESSURE: 65 MMHG | HEART RATE: 79 BPM | BODY MASS INDEX: 30.12 KG/M2 | OXYGEN SATURATION: 98 % | WEIGHT: 201 LBS | SYSTOLIC BLOOD PRESSURE: 118 MMHG

## 2022-06-13 DIAGNOSIS — R10.9 CRAMPING AFFECTING PREGNANCY, ANTEPARTUM: Primary | ICD-10-CM

## 2022-06-13 DIAGNOSIS — Z34.80 SUPERVISION OF OTHER NORMAL PREGNANCY: ICD-10-CM

## 2022-06-13 DIAGNOSIS — O26.899 CRAMPING AFFECTING PREGNANCY, ANTEPARTUM: Primary | ICD-10-CM

## 2022-06-13 LAB
ALBUMIN UR-MCNC: NEGATIVE MG/DL
APPEARANCE UR: CLEAR
BILIRUB UR QL STRIP: NEGATIVE
CLUE CELLS: PRESENT
COLOR UR AUTO: YELLOW
GLUCOSE UR STRIP-MCNC: NEGATIVE MG/DL
HGB UR QL STRIP: NEGATIVE
KETONES UR STRIP-MCNC: NEGATIVE MG/DL
LEUKOCYTE ESTERASE UR QL STRIP: NEGATIVE
NITRATE UR QL: NEGATIVE
PH UR STRIP: 6.5 [PH] (ref 5–8)
SP GR UR STRIP: <=1.005 (ref 1–1.03)
TRICHOMONAS, WET PREP: ABNORMAL
UROBILINOGEN UR STRIP-ACNC: 0.2 E.U./DL
WBC'S/HIGH POWER FIELD, WET PREP: ABNORMAL
YEAST, WET PREP: ABNORMAL

## 2022-06-13 PROCEDURE — 87210 SMEAR WET MOUNT SALINE/INK: CPT | Performed by: ADVANCED PRACTICE MIDWIFE

## 2022-06-13 PROCEDURE — 81003 URINALYSIS AUTO W/O SCOPE: CPT | Performed by: ADVANCED PRACTICE MIDWIFE

## 2022-06-13 PROCEDURE — 99207 PR PRENATAL VISIT: CPT | Performed by: ADVANCED PRACTICE MIDWIFE

## 2022-06-13 PROCEDURE — 87653 STREP B DNA AMP PROBE: CPT | Performed by: ADVANCED PRACTICE MIDWIFE

## 2022-06-13 RX ORDER — METRONIDAZOLE 500 MG/1
500 TABLET ORAL 2 TIMES DAILY
Qty: 14 TABLET | Refills: 0 | Status: ON HOLD | OUTPATIENT
Start: 2022-06-13 | End: 2022-06-18

## 2022-06-13 ASSESSMENT — EDINBURGH POSTNATAL DEPRESSION SCALE (EPDS)
TOTAL SCORE: 1
I HAVE BEEN ANXIOUS OR WORRIED FOR NO GOOD REASON: NO, NOT AT ALL
I HAVE BEEN SO UNHAPPY THAT I HAVE HAD DIFFICULTY SLEEPING: NOT AT ALL
I HAVE LOOKED FORWARD WITH ENJOYMENT TO THINGS: AS MUCH AS I EVER DID
I HAVE BLAMED MYSELF UNNECESSARILY WHEN THINGS WENT WRONG: NOT VERY OFTEN
THINGS HAVE BEEN GETTING ON TOP OF ME: NO, I HAVE BEEN COPING AS WELL AS EVER
I HAVE BEEN ABLE TO LAUGH AND SEE THE FUNNY SIDE OF THINGS: AS MUCH AS I ALWAYS COULD
I HAVE FELT SAD OR MISERABLE: NO, NOT AT ALL
I HAVE FELT SCARED OR PANICKY FOR NO GOOD REASON: NO, NOT AT ALL
THE THOUGHT OF HARMING MYSELF HAS OCCURRED TO ME: NEVER
I HAVE BEEN SO UNHAPPY THAT I HAVE BEEN CRYING: NO, NEVER

## 2022-06-13 NOTE — PATIENT INSTRUCTIONS
"[facility] Nurse Midwives [facility]  - Contact information:  Appointment line and to get a hold of CNM in clinic Monday-Friday 8 am - 5 pm:  [phone].  There are some clinics with early start times (1st appointment 7:40 am) and others with evening hours (last appointment 6:20 pm).  Most are typically open from 8 am to 5 pm.    CNM on call answering service: [phone].  Specify your hospital of choice and leave a brief message for CNM;  will then page CNM who is on call at your specified hospital and you should receive a call back with 15 minutes.  Be sure that your ringer is audible and that you can accept blocked calls so that we can get back in touch with you! This number should be reserved for urgent needs if during the day, before 8 am, after 5 pm, weekends, holidays.    Contact the on-call CNM with warning signs, such as:  vaginal bleeding   Vaginal discharge and itching or pain and burning during urination  Leg/calf pain or swelling on one side  severe abdominal pain  nausea and vomiting more than 4-5 times a day, or if you are unable to keep anything down  fever more than 100.4 degrees F.     [place]  After each of your visits you are welcome to check [employer] for your visit summary including education and links to information relevant to your pregnancy and/or well woman care.   Find the \"Visits\" tab at the top of the page, you will see a list of recent visits and for each visit a for link for \"View After Visit Summary.\" View of your After Visit Summary will allow you to read our recommendations from your visit, review any education provided, and link to websites with useful information.   If you have any questions or difficulty navigating [employer], please feel free to contact us and we will do our best to direct you.  Meet the Midwives from [facility]  You are invited to an informational meet and greet with [facility]'s [facility] Certified Nurse-Midwives. Our " "free \"Meet the Midwives\" event is a great opportunity to learn about our midwives' philosophy and experience, the hospitals where we can assist with your birth, and answer questions you may have. Partners, friends, and family are welcome to attend. Currently, this is a virtual event.  Date  First Tuesday of every month at 7 pm.    Link to next (live) meeting  [email or web address]  To Join by Telephone (audio only) Call:   [phone] Phone Conference ID: [phone]#    Touring the Maternity Care Center  At this time we are offering a virtual tour of the Maternity Care Centers at both [facility] and [facility]:   [facility]: [email or web address]  [place]:   [email or web address]  [email or web address]/[email or web address]  When in person tours become available, registrations is required. To schedule a tour at either [facility] or [facility], please do so online using the following links:  [facility] - [email or web address]  [facility] - [email or web address]    Postpartum Depression  The first weeks of caring for a  baby are more than a full-time job. Although it is often a happy time, your feelings and moods may not be what you expected. Many women experience  baby blues.  Here are some tips to help you understand when feelings of sadness are normal, and when you should call your health care provider.    What are the baby blues?  As many as 3 of every 4 women will have short periods of mood swings, crying, or feeling cranky or restless during the first weeks after birth. These feelings can be worse when " you are tired or anxious. Women who have the baby blues often say they feel like crying but don t know why. Baby blues usually happen in the first or second week postpartum (after you give birth) and last less than a week. If you are not sleeping, becoming more upset, don t feel like you can take care of your baby, or your sadness lasts 2 weeks or more, call your health care provider.    What is postpartum depression?  About one in every 5 women will develop depression during the first few months postpartum that may be mild, moderate, or severe. Women who have postpartum depression may have some of these symptoms:  Feeling guilty   Not able to enjoy your baby and feeling like you are not bonding with your baby    Not able to sleep, even when the baby is sleeping  Sleeping too much and feeling too tired to get out of bed  Feeling overwhelmed and not able to do what you need to during the day  Not able to concentrate  Don t feel like eating  Feeling like you are not normal or not yourself anymore  Not able to make decisions  Feeling like a failure as a mother  Feeling lonely or all alone  Thinking your baby might be better off without you  If you have any of these symptoms, call your health care provider!    Which symptoms of postpartum depression are dangerous?  Sometimes a woman with postpartum depression will have thoughts of harming herself or her baby. If you find yourself thinking about hurting yourself or your baby, call your health care provider immediately.    MOTHERHOOD: THE EARLY DAYS  You prepare for the birth of your baby for many months during pregnancy, and then the first months at home after your baby is born can be a quiet, gentle time of getting to know this new person who has come to live in your home. But for most women it is not all quiet or sweet. And for some women it is a very hard time.  What Can I Expect in the First Few Months After the Baby Comes?  New mothers and their families face many  challenges in the first few months:  Your body and your hormones have to get back to normal.  You and the baby will be learning to breastfeed.  Babies only sleep a few hours at a time. The entire family will have a hard time getting enough sleep.  You and your family need to learn how to parent this new family member.  If you have a partner, you have to figure out how to stay together as a couple and maybe even start to have sex again.  You may have to figure out how to keep from getting pregnant again right away.  You may need to return to work and find day care.    How Long Will it Take for My Body to Get Back to Normal?  Some changes will occur quickly. Others will not occur as quickly.  Your uterus, cervix, and vagina will all shrink to their nonpregnant size in about 2 weeks. Your vagina may be tender and dry for a few months--especially if you are breastfeeding.  If you had stitches or hemorrhoids, your   bottom   will be sore for 2 weeks or more.  For some women who have problems urinating, it can take several months for you to be able to hold your urine when you cough or sneeze or suddenly  something heavy.  Your breast milk will   come in   2 to 3 days after the birth of your baby. It will take 6 to 8 weeks for you and the baby to get the hang of breastfeeding and find a pattern. During these first weeks, you can have engorged breasts at times and often leak milk.  Your stomach and intestines all have to fall back into place. You may have a lot of gas for a few weeks.  You may be constipated--especially if you are breastfeeding.  Your stretched stomach muscles can recover in a few weeks, but for some women it takes longer--6 months or a year--to recover.  If you had a  delivery, you may have pain or numbness around the incision for 6 months or more.  Losing the weight you gained during pregnancy will probably take 6 months to a year. Have patience! It took 40 weeks to get here. Give yourself  40 weeks to get back.    What Can I Expect When My Hormones Change?  About 75% of all women will get the   blues.   This usually starts about 3 days after the birth of your baby. You may cry easily and feel very, very tired. A few women become very depressed. If you had a  delivery or your new baby was sick, you are at a higher risk for depression.  Call your health care provider right away if you cannot care for yourself or your baby, if you feel very nervous or worried, if you cannot stop crying, or if you are having thoughts of hurting yourself or your baby.    Taking Care of Yourself  While you are still pregnant:  Talk with your partner and your family about the time ahead. Arrange for someone to help you during the first weeks at home if you can.  Talk with your health care provider about birth control options and make a plan before the baby comes.  If you are worried about how to parent a , take parenting classes. You will learn a lot about how babies act and you will make some friends who are going through the same thing at the same time. Most [facility] have these classes.  Arrange for someone to help with baby care if you can.  After the baby comes:  Ask for help. Let other people do the cooking and cleaning and run the house. Focus on yourself and your baby.  Sleep whenever you can. Try not to be tempted to   get some things done   when the baby sleeps. This is your time to sleep, too.  Drink lots of water. You will need at least 6 big glasses of water everyday to avoid constipation and make enough breast milk. Every time you sit down to breastfeed, have a big glass of water with you to drink while you are nursing.  Eat lots of vegetables and fruit. You will need lots of vitamins and fiber to help your body get back to normal. This will also help you avoid constipation.  Go outside and walk. Babies can go outside even if it is very cold. Fresh air and sunshine will do you both good.  Take  sitz baths. Put about 6 inches of warm water in your bathtub and sit in there for 15 minutes 2 to 3 times a day. This will help your   bottom   heal more quickly. It will also give you 15 minutes of private time!  Talk to other mothers. Join a new parents group. Call [employer] and go to [facility] meetings if you are breastfeeding.     With your partner:  Keep talking. Share the experience.  Spend time alone. Even a 30-minute walk can be a date.  Start a birth control method. You can get pregnant before you even have a period. It is very important to use birth control if you do not want to get pregnant again right away.  When you have sex, use a lubricant. A lot of lubricant! Take it slow.  The first few months after a baby comes can be a lot like floating in a jar of honey--very sweet and bansal, but very sticky, too. Take time to enjoy the good parts. Remind yourself that this time will pass. Bon voyage!    FOR MORE INFORMATION  For questions about depression during and after pregnancy:  http://www.womenshealth.gov/publications/our-publications/fact-sheet/depression-pregnancy.html   After birth: The first 6 weeks:  http://www."[employer].PSC Info Group/Post-Birth-and-Recovery   Breastfeeding resources:  http://www.ourbodiesourselves.org/health-info/getting-breastfeeding-off-to-a-good-start/    Preparing for your baby:       Car Seat Clinics:  https://dps.[place].gov/divisions/ots/child-passenger-safety/Pages/car-seat-checks.aspx  [facility]    Free Car Seat Distribution Facilities     By Appt. Address Contact Information (For appointment)      \Yes [facility]\[address]\[place],\cell Eladia Villarreal)[email or web address]      Yes [facility]\[address]\[place],\cell Olivia Daugherty)[email or web address]      Yes [facility]\[address]\[place],\cell Sheryl Mack)[email or web address]       Immunizations:  http://www.cdc.gov/vaccines/schedules/easy-to-read/child.html    Early Screening Program  Http://www.health.[facility].mn.us/newbornscreening/  [place] newborns are tested soon after birth for more than 50 hidden, rare disorders, including hearing loss and critical congenital heart disease (CCHD). This site provides resources and information for families and providers.    When to call:   Appointment line and to get a hold of a midwife in clinic Monday-Friday 8 am - 5 pm:  [phone].  There are some clinics with early start times (1st appointment 7:40 am) and others with evening hours (last appointment 6:20 pm).  Most are typically open from 8 am to 5 pm.    CNM on call answering service: [phone].  Specify your hospital of choice and leave a brief message for a midwife;  will then page a midwife who is on call at your specified hospital and you should receive a call back with 15 minutes.  Be sure that your ringer is audible and that you can accept blocked calls so that we can get back in touch with you! This number should be reserved for urgent needs if during the day, before 8 am, after 5 pm, weekends, holidays.    Contact the on-call CNM with warning signs, such as:  vaginal bleeding   Vaginal discharge and itching or pain and burning during urination  Leg/calf pain or swelling on one side  severe abdominal pain  nausea and vomiting more than 4-5 times a day, or if you are unable to keep anything down  fever more than 100.4 degrees F.     Make plans for transportation and  as needed for when you are going to the hospital.    Ask your health care provider about vaccinations you may need following delivery. By now, you should have received a Tdap immunization to protect against pertussis or whooping cough. Fathers and family members who will be in close contact with the baby should also receive a Tdap shot at least two weeks before the expected birth of the baby if  they have not had a Td (tetanus) shot for at least two years.    Your midwife may offer to check your cervix for changes. If you are past your due date, discuss the next steps leading to delivery with your midwife. If you don't start labor on your own by 41 or 42 weeks, your midwife may recommend giving you medicines to ripen your cervix and start labor.  Induction of labor: http://onlinelibrary.olmstead.com/store/10.1016/j.jmwh.2008.04.018/asset/j.jmwh.2008.04.018.pdf?v=1&t=wnul0lgo&k=[email or web address]    Tell your midwife or physician how you plan to feed your baby (breast or bottle), who you have chosen to do pediatric care for your baby, and if you have a boy, whether you have chosen to have him circumcised. You will need a car seat correctly installed in your vehicle to bring your baby home. As you start to set up the nursery at home for your baby, make sure the crib is safe. The mattress needs to fit snugly against the edges of the crib. If you can fit a soda can between the bars, they are too far apart and can allow the baby's head to caught between them.    Learn about infant care and feeding, including information about infant CPR. We recommend that you put your baby to sleep on his or her back to reduce the chance of Sudden Infant Death Syndrome (SIDS). To maintain a healthy environment in which your child can grow, it's best to keep your home smoke-free. By preparing ahead, your transition into parenthood will go smoothly for you and your baby.    Your midwife will want to see you for a checkup 2 to 6 weeks after delivery.      Making Plans for Feeding My Baby    By this point, you probably have read a lot about feeding your baby.  Breastfeed or formula? Each mother s decision is her own and [facility] respects you and your choices. We ve gathered information on both breastfeeding and formula feeding to help with your decision. Talking with your physician or nurse-midwife can also help  in your decision.  However you plan to feed your baby, [facility] encourage rooming in with your baby, skin-to-skin contact and feeding your baby based on his or her cues.    Skin-to-skin contact  Being close to mom helps your baby adjust to life outside of the womb.  It helps your baby regulate their body temperature, heart rate, and breathing.  Your baby will usually be placed skin-to-skin immediately following birth or as soon as possible, if medical intervention is needed.    Rooming-In  Having your baby stay with you in your room is called  rooming-in .  Keeping your baby in your room helps you to learn how to care for your baby by getting to know your baby s cues, body rhythms and sleep cycle.       Cue-based feeding  Cues (signals) are baby s way of telling you what he or she wants.  When you learn your infant s cues, you know how to care for and feed your baby.   Feeding cues are the licking and smacking of lips, bringing their fist to their mouth, and a reflex called  rooting - where baby turns and opens his or her mouth, searching for the breast or bottle.  Crying is a late feeding cue.  Babies can feed frequently, often at least 8 times in 24 hours.  Breastfeeding facts  Breast milk is the best source of nutrition for your baby and is available at birth. In the first couple of days, your milk volume is already starting to increase, though it may not be noticeable. Breastfeed frequently to increase your milk supply. Within three to five days, you will begin to notice larger milk volumes. An increase in breast size, heaviness and firmness are often described as the milk  coming in.  Frequent breastfeeding can help breasts from getting overly firm and painful. You will know the baby is getting enough milk if your baby is having wet and dirty diapers and gaining weight.     If your goal is to exclusively breastfeed, it is important to not use any formula or artificial nipples (including  bottles and pacifiers) while your baby is learning to breastfeed.  While it may seem like an  easy  option to give your baby a bottle, formula should only be given if there is a medical reason for your baby to have it.    Positioning and attachment   Get comfortable.  Use pillows as needed to support your arms and baby.  Hold baby close at the level of your breast, facing you in a tummy to tummy position.  Skin to skin helps with this.  Position the baby with his or her nose by the nipple.  There should be a straight line from baby s ear to shoulder to hips.  Tickle your baby s lips or wait for baby to open mouth wide, bring baby to breast by leading with the chin.  Aim the nipple at the roof of baby s mouth.  A rapid sucking pattern is followed by longer, drawing pattern with occasional swallows heard.  When baby is correctly latched, your nipple and much of the areola are pulled well into baby s mouth.      Returning to work or school  Focus on a good start to breastfeeding.  Many women continue to provide breastmilk for their baby when they return to work or school.  Making plans about where to pump and store milk can make the transition go well.  Talk with other mothers who have also returned to work or school for tips and support.  Your employer s Human Resource department may be a resource as well.     Returning to work or school: (continued)   babies can mean fewer  sick  days for you.  A quality breast pump will also save time and add comfort.  Check with your insurance prior to giving birth for breast pump coverage.  Many insurance companies include a pump within your benefits.  Wait until your baby is at least three weeks old to introduce a bottle for the first time.  Have someone besides you give the bottle.  Breastfeed when you are with your baby. Reserve your bottles of breast milk for when you are away.   Your breasts will need to be  emptied  either by your baby or a pump.  Plan to pump at  least twice in an eight hour day.  If you cannot pump at work, continue breastfeeding at home. Any amount of breast milk is worth giving to your baby.    Formula feeding facts  If you are planning to use formula to feed your baby, you will want to make some preparations ahead of time. Talk to your doctor or nurse-midwife about what type of formula to use. Some are iron-fortified, meaning they have extra iron in them. You will want to purchase formula and bottles before your baby is born to be sure you are ready after you return from the hospital. The [facility] do not provide formula samples to take home.    Be sure to follow formula mixing directions closely. Regular milk in the dairy case at the grocery store should not be given to babies under 1 year old. Baby formula is sold in several forms including:  Ready-to-use. This is the most expensive, but no mixing is necessary.  Concentrated liquid. This is less expensive than ready-to-use and you mix with water.  Powder. This is the least expensive. You mix one level scoop of powdered formula with two ounces of water and stir well.    Most babies need 2.5 ounces of formula per pound of body weight each day. This means an 8-pound baby may drink about 20 ounces of formula a day; however, this is just an estimate. The most important thing is to pay attention to your baby s cues.  If your baby is always fussy, needs more iron or has certain food allergies, your physician may suggest you change your baby s formula to a different kind.     How do I warm my baby s bottles?  You may feed your baby a bottle without warming it first. It is OK for the breast milk or formula to be cool or room temperature. If your baby seems to prefer it warmed, you can put the filled bottle in a container of warm water and let it stand for a few minutes. Check the temperature of the liquid on your skin before feeding it to your baby; to be sure it isn t too hot. Do not heat bottles in  the microwave. Microwaves heat food and liquids unevenly, and this can cause hot spots that can burn your baby.    How do I clean and sterilize bottles?  Sterilize bottles and nipples before you use them for the first time. You can do this by putting them in boiling water for 5 minutes. After that first time, you can wash them in hot and soapy water. Rinse them carefully to be sure there is no soap left on them. You can also wash them in the .      Am I in Labor?  What is labor? Labor is the work that your body does to birth your baby. Your uterus (the womb) contracts(tightens). The contractions(labor pains) push your baby down onto your cervix(the opening ofyour uterus). Thispressure causesyour cervix to open. When your cervix iscompletely open (10 centimeters dilated), you will push your baby through your vagina and out into the world.  What do contractions feel like? When contractionsfirst start, theyusually feellike cramps duringyour period. Sometimesyoufeelpain in your back. Mostoften,contractions feel like muscles pulling painfully in your lower belly. At first, the contractions will probably be 15 to 20 minutes apart.They maybe irregular and will not feel too painful. As labor goes on, the contractionsget stronger,closer together, more consistent, and more painful.  How do I time the contractions? When the contractionsseem to be coming regularly, youshould start to time them.You time your contractions by counting the number of minutes from the start of one contraction to the start of the next contraction.  What should I do during early labor when the contractions start? If it is night andyoucan sleep, do so. If it happensduring the day, there are some things you can do to take care of yourself at home: Walk. If the painsyou are having are reallabor, walking will makethecontractionscome closer together and they will be stronger,but you will be able to cope with them better if you are standing or  moving around. If the contractions are early labor ones that come andgo (sometimes called false labor), walkingcan make them go away. Take a shower or bath. This will help you relax. Eat. Labor is a big event.Your body needs a lot of energy to be effective.Eat whatever you feel like eating. Drink water. Not drinking enough water can cause contractions to not be as effectiveas theyshould be.You need to be well hydrated (drinking enoughwater) to help your body work well during labor. Take a na p. If youfeel tired, lay down on your side and get all the rest you can. It helps to be rested when you go intoactive labor. Do something you enjoy. Spend time with family. Watch a movie. Distraction will help you relax. Get a massage. If your labor is in your back, a strong massage on your lower back may feel very good. Getting a foot massage or having a partner rub your feet can also be very relaxing. Don t panic. You can do this. Your body was made for this. You are strong!  When should I call my health care provider if I think I am in labor? Your contractions have been 5 minutes or less apart for at least an hour. Your contractions are becoming so painful youcannot walk or talk during one. You think your amniotic sac (bag of de anda) breaks. You may have a big gush of amniotic fluid (water) or just fluid that runs down your legs when you walk or move or change position.  Are there other reasons to call my health care provider? If you are concerned about anything, don t hesitate to call your health care provider.You should definitely call your health care provider or go to the hospital if:  It is 3 weeks or more before your due date, and you are having contractions.  You have vaginal bleeding that is more than your period, soaks your underwear, or runs down your legs.  You have sudden severe pain that does not go away with rest.  Your baby has not movedfor several hours.  You are leaking greenish fluid.    For More  Information: http://onlinelibrary.olmstead.com/doi/10.1111/jmwh.26709/epdf     [place] Department of Health and Human Services: Signs oflabor,labor stages, and types of birth  http://womenshealth.gov/pregnancy/childbirth-beyond/labor-birth.html#a      Childbirth and Parenting Education:       Everyday Miracles:   https://www.everyday-miracles.org/    Free Video Series from [facility]: https://nursing.[facility]/academics/specialty-areas/nurse-midwifery/having-baby-prenatal-videos/having-baby-prenatal-and    [facility] center: http://Cargo Cult SolutionsNaval Medical Center San DiegoMedia Temple/   [phone]  [place]: (education, yoga & wellness) www.[facility]ure Neurosciences  Enlightened Mama: www.enlightenedmama.[employer]Parade Pictures   Childbirth collective: (Parent topic nights)  www.childbirthcollective.org/  Hypnobabies:  www.[employer]ob[employer].PayParade Pictures/  Hypnobirthing:  Http://hypnobirthing.[employer]Parade Pictures/  The Birth Hour: https://iCrimefighter/online-childbirth-class/    APPS and Podcasts:   Luz Mcdermott Nurture    Evidence Based Birth  The Birth Hour (for birth stories)   Birthful   Expectful   The Longest Shortest Time  PregnancyPodcast Nury Maurice    Book Recommendations:   Nevin Hazelhurst's Birthing From [facility]--first few chapters include a new-age tone, you may prefer to skip it and keep going, because there is good stuff later.  This book recommendation covers emotional preparation, but does cover coping with pain, and use of both pharmacological and nonpharmacological methods.    Dr. Acevedo' The Pregnancy Book and The Birth Book--the pregnancy book goes month-by month      The Birth Partner by Kathleen Yi    Womanly Art of Breastfeeding by La Leche League International   Bestfeeding by Patricia Shore--great pictures    Mothering Your Nursing Toddler, by Aura Freitas.   Addresses dealing with so many of the challenging behaviors of a nursing toddler.  How Weaning Happens, by La Leche League.  Discusses weaning at all ages, from medically necessary weaning of an infant,  "all the way up to age 5 (or older), with why/why not, and strategies.  Very empowering book both for deciding to wean and deciding not to.    American College of Nurse-Midwives (ACNM) http://www.midwife.org/; look at the informational handouts at http://www.midwife.org/Share-With-Women     www.mymidwife.org    Mother to Baby (Medication and Herbal guidance in pregnancy): http://www.mothertobaby.org  Toll-Free Hotline: 570.870.4789  LactMed (Medication use while breastfeeding): http://toxnet.nlm.nih.gov/newtoxnet/lactmed.htm    Women's Health.gov:  http://www.womenshealth.gov/a-z-topics/index.html    American pregnancy association - http://americanpregnancy.org    Centering Pregnancy (group prenatal care option): http://centeringhealthcare.org    Information about doulas:  Childbirth collective: http://www.childbirthcollective.org/  Doulas of [place] (VICENTE):  www.vicente.org  [facility]  project: http://twincitiesdoulaproject.com/     Early Childhood and Family Education (ECFE):  ECFE offers parents hands-on learning experiences that will nourish a lifetime of teachable moments.  http://ecfe.info/ecfe-home/    March of Dimes www.[employer].Car Rent[employer] Market     FDA - Nutrition  www.mypyramid.gov  Under \"For Consumers,\" click on \"pregnant and breastfeeding women.\"      Centers for Disease Control and Prevention (CDC) - Vaccines : http://www.cdc.gov/vaccines/       When researching information on the web, question the validity of websites.  The domains .gov, .edu and.org tend to be more reliable information.  If there are a lot of advertisements, be cautious of the information provided. Stay away from blogs and chat rooms please!       Virtual Breastfeeding Support:    During this time of isolation, breastfeeding families need even more community!  Here are some area organizations offering virtual support groups for breastfeeding:    [facility] Cafe Support Group, Tuesdays at 10:30 am   Run by BRETT Khan of The Baby " "[facility]   Go to [employer] [facility] Facebook page and click on \"events\" for link   https://www.facebook.com/events/355493913783966/  [facility] Milk Hour,  at 2:30 pm    Run by BRETT Gillespie   Go to [facility] FB page and send message to get link   https://www.[employer].eCareer/healthfoundations/  [facility]/[place] holding virtual meetings the first Tuesday of each month, 8-9 pm, and the   Third Saturday, 10 - 11 am.  Go to [facility] [place] FB page; message to get link https://www.[employer].eCareer/LLLofGoldJose Alejandro/?hc_location=i  [employer] offers a Lactation Lounge every Friday 12pm - 1pm, run by Tete Dawkins [facility] Leader   Sign up via link at Babelverse/cbe-lactation   https://www.Babelverse/cbe-lactation  [facility] is offering virtual support groups every Monday, 10:30 am - 12 pm, run by nurse IBRHONDA   Https://www.facebook.com/events/147611254214074/    Prenatal Breastfeeding Classes:      [employer] is offering virtual breastfeeding and  care classes:  https://www.Babelverse/education-workshops  BirthEd childbirth and breastfeeding education offering virtual prenatal breastfeeding classes  https://www.birthedmn.com/workshops    "

## 2022-06-14 LAB — GP B STREP DNA SPEC QL NAA+PROBE: NEGATIVE

## 2022-06-17 ENCOUNTER — TELEPHONE (OUTPATIENT)
Dept: MIDWIFE SERVICES | Facility: CLINIC | Age: 32
End: 2022-06-17
Payer: COMMERCIAL

## 2022-06-18 ENCOUNTER — HOSPITAL ENCOUNTER (OUTPATIENT)
Facility: CLINIC | Age: 32
Discharge: HOME | End: 2022-06-18
Attending: ADVANCED PRACTICE MIDWIFE | Admitting: MIDWIFE
Payer: COMMERCIAL

## 2022-06-18 VITALS — WEIGHT: 201 LBS | BODY MASS INDEX: 29.77 KG/M2 | HEIGHT: 69 IN

## 2022-06-18 PROBLEM — Z36.89 ENCOUNTER FOR TRIAGE IN PREGNANT PATIENT: Status: ACTIVE | Noted: 2022-06-18

## 2022-06-18 PROCEDURE — 258N000003 HC RX IP 258 OP 636: Performed by: MIDWIFE

## 2022-06-18 PROCEDURE — G0463 HOSPITAL OUTPT CLINIC VISIT: HCPCS

## 2022-06-18 PROCEDURE — 99213 OFFICE O/P EST LOW 20 MIN: CPT | Performed by: MIDWIFE

## 2022-06-18 RX ORDER — LIDOCAINE 40 MG/G
CREAM TOPICAL
Status: DISCONTINUED | OUTPATIENT
Start: 2022-06-18 | End: 2022-06-18 | Stop reason: HOSPADM

## 2022-06-18 RX ORDER — METRONIDAZOLE 250 MG/1
250 TABLET ORAL 3 TIMES DAILY
Status: ON HOLD | COMMUNITY
End: 2022-07-12

## 2022-06-18 RX ADMIN — SODIUM CHLORIDE, POTASSIUM CHLORIDE, SODIUM LACTATE AND CALCIUM CHLORIDE 500 ML: 600; 310; 30; 20 INJECTION, SOLUTION INTRAVENOUS at 03:10

## 2022-06-18 NOTE — TELEPHONE ENCOUNTER
Margarita is a 32 year old  at 36w2d gestation who calls tonight with concerns about contractions. She has been trying to rest, feels like she is well hydrated, denies bleeding or bloody show. She keeps waking up to tightness in her belly and pressure. Feels different than with her first baby. Denies gush of fluid or leaking. Feeling baby move. Not sure how often it is happening since she is trying to sleep and it's waking her up. Advised warm bath or shower and monitor frequency of contractions. If contractions less than 10 min apart will call back and come in for evaluation. If contractions space out after bath or > 15 min apart will try to rest at home.

## 2022-06-18 NOTE — PROGRESS NOTES
"Outpatient/Triage Note:    Patient Name:  Margarita Roper  :      1990  MRN:      [id number]      Assessment:   @ 36w3d here for evaluation of uterine contractions.     Plan:   - Discharge to home undelivered. Reviewed warning signs including decreased fetal movement, leaking of fluid, vaginal bleeding, or signs of labor. Reviewed how to contact on-call CNM. Follow-up in clinic with CNM as scheduled or sooner as needed. All questions answered. Agrees with plan.     Subjective:  Margarita Roper is a 32 year old  at 36 weeks 3days, with an EDC of 22 who presented to [facility] for evaluation of uterine contractions since 10pm. Denies leaking of fluid, bleeding, or changes in fetal movement. Has been monitored for 3+ hours, no cervical change noted, no leaking of fluid. Pt is tired and would like to go home and try and sleep. Has Unisom at home and would like to try that    Objective:  Vital signs: Ht 1.753 m (5' 9\")   Wt 91.2 kg (201 lb)   LMP 10/09/2021   BMI 29.68 kg/m    FHR: category I tracing  Uterine contractions: Q1-4 min mild-mod    Physical Exam:   Abdomen: SIUP, vertex by Leopold's, abdomen non-tender  SVE: 1cm/50%/-2/post/soft  Monitored from 6389-5092, IV fluids 500cc bolus infused     No intake or output data in the 24 hours ending 22 0441    Results:  NST reactive    Provider:Sharon Rey DNP,APRN,CNM    TT with patient 20mn >50% time spent counseling.          "

## 2022-06-18 NOTE — PROGRESS NOTES
SVE recheck by HAWA is unchanged. Plan to discharge pt. Pt is agreeable.     Discharge paperwork discussed with and signed by pt. All questions and concerns were addressed. Pt ambulated off unit for discharge home at this time.

## 2022-06-18 NOTE — TELEPHONE ENCOUNTER
Margarita is a 32 year old  at 36w2d gestation who called back after taking a warm bath and reports contractions Q3-5 min lasting 30 sec. Advised to present to [facility] for labor evaluation.

## 2022-06-18 NOTE — PROGRESS NOTES
Pt arrives to OB triage c/o contractions since 10pm. +FM, no LOF or vaginal bleeding noted. SVE resulted in 1/50/-2. States she is on day 4 of treatment for BV, but this feels different then when she was seen prior.  Category 1 tracing.

## 2022-06-18 NOTE — DISCHARGE INSTRUCTIONS
Discharge Instruction for Undelivered Patients      You were seen for: Labor Assessment  We Consulted: Sharon Rey    Call your provider if you notice:  Swelling in your face or increased swelling in your hands or legs.  Headaches that are not relieved by Tylenol (acetaminophen).  Changes in your vision (blurring: seeing spots or stars.)  Nausea (sick to your stomach) and vomiting (throwing up).   Weight gain of 5 pounds or more per week.  Heartburn that doesn't go away.  Signs of bladder infection: pain when you urinate (use the toilet), need to go more often and more urgently.  The bag of de anda (rupture of membranes) breaks, or you notice leaking in your underwear.  Bright red blood in your underwear.  Abdominal (lower belly) or stomach pain.  For first baby: Contractions (tightening) less than 5 minutes apart for one hour or more.  Second (plus) baby: Contractions (tightening) less than 10 minutes apart and getting stronger.  *If less than 34 weeks: Contractions (tightening) more than 6 times in one hour.  Increase or change in vaginal discharge (note the color and amount)  Other: ***    Follow-up:  As scheduled in the clinic

## 2022-06-18 NOTE — PROGRESS NOTES
Pt remains uncomfortable with ctx after 500ml bolus. Sharon Rey updated and plans to come to bedside to discuss plan of care with pt.

## 2022-06-18 NOTE — PROGRESS NOTES
Pt remains uncomfortable with ctx. SVE recheck with no change. Sharon chamberlain updated and t/o for 500cc LR bolus.

## 2022-06-20 ENCOUNTER — TELEPHONE (OUTPATIENT)
Dept: MIDWIFE SERVICES | Facility: CLINIC | Age: 32
End: 2022-06-20
Payer: COMMERCIAL

## 2022-06-22 ENCOUNTER — PRENATAL OFFICE VISIT (OUTPATIENT)
Dept: MIDWIFE SERVICES | Facility: CLINIC | Age: 32
End: 2022-06-22
Payer: COMMERCIAL

## 2022-06-22 VITALS
HEART RATE: 84 BPM | BODY MASS INDEX: 29.53 KG/M2 | WEIGHT: 200 LBS | SYSTOLIC BLOOD PRESSURE: 108 MMHG | DIASTOLIC BLOOD PRESSURE: 60 MMHG

## 2022-06-22 DIAGNOSIS — Z67.91 RH NEGATIVE STATE IN ANTEPARTUM PERIOD: ICD-10-CM

## 2022-06-22 DIAGNOSIS — Z34.80 SUPERVISION OF OTHER NORMAL PREGNANCY: Primary | ICD-10-CM

## 2022-06-22 DIAGNOSIS — Z30.8 ENCOUNTER FOR TUBAL LIGATION COUNSELING: ICD-10-CM

## 2022-06-22 DIAGNOSIS — O26.899 RH NEGATIVE STATE IN ANTEPARTUM PERIOD: ICD-10-CM

## 2022-06-22 LAB
HGB BLD-MCNC: 12.7 G/DL (ref 11.7–15.7)
HOLD SPECIMEN: NORMAL

## 2022-06-22 PROCEDURE — 36415 COLL VENOUS BLD VENIPUNCTURE: CPT | Performed by: MIDWIFE

## 2022-06-22 PROCEDURE — 85018 HEMOGLOBIN: CPT | Performed by: MIDWIFE

## 2022-06-22 PROCEDURE — 99207 PR PRENATAL VISIT: CPT | Performed by: MIDWIFE

## 2022-06-22 NOTE — PROGRESS NOTES
"Margarita is a  at 37w0d who presents to clinic today for a routine prenatal visit.      Exam:  see prenatal flowsheet    The following topics were covered in today's visit:  1. Reviewed GBS negative testing results.    2. HGB missed last visit.  Will draw today.  3. Has had a hard week the last week and very tearful today.  Was in [facility] on Friday with frequent contractions (IV fluids, monitoring, etc).  Happened again Saturday night but didn't seek care as knew wasn't in labor by that time, so just took a unisom to sleep.   baby moved in an unusual position, and was so uncomfortable that called CNM to get positioning ideas.  Has a history of prodromal labor for a long time on/off with last pregnancy, and the events of the last weekend are really triggering for her with that prior experience.  Feeling like it will be hard to deal with significant on-going discomfort and lack of sleep for weeks if this persists.  Also admits that she's typically a very high, organized individual and that it's really hard for her to feel so disorganized and fatigued.  EPDS completed today for a score of 11 (question 10 as an answer of \"0\").  Talked for a while about comfort measures, self-care, self-gentleness, adjusting self-expectations so that she can achieve the self care, seeking support from friend/family, etc.  Also talked about the normalcy of having contractions frequently at the end of pregnancy, and that the pattern to contract in the evening hours is not unusual (may be body fatigue, or mild dehydration, or other), and how self-care and adequate hydration may help support her discomforts.  Talked about using affirmations around her home to encourage herself (a practice she already does, but could alter to this specific situation).  Seems to be feeling better at the end of our conversation.  Declines consideration of therapy/med management for additional support.  4. Interested in a tubal ligation postpartum.  " Prefers to have it while in the hospital, prior to discharge to home.  Hasn't had a consult visit yet.  Answered some basic questions, and referral made to [facility] for a tubal consult.  5. Reviewed CNM on-call number and when to call including for s/s of labor, srom, decreased fetal movement, bleeding, or other warning signs.

## 2022-06-22 NOTE — PATIENT INSTRUCTIONS
"[facility] Nurse Midwives - Contact information:  Appointment line and to get a hold of CNM in clinic Monday-Friday 8 am - 5 pm:  [phone].  There are some clinics with early start times (1st appointment 7:40 am) and others with evening hours (last appointment 6:20 pm).  Most are typically open from 8 am to 5 pm.    CNM on call answering service: [phone].  Specify your hospital of choice and leave a brief message for CNM;  will then page CNM who is on call at your specified hospital and you should receive a call back with 15 minutes.  Be sure that your ringer is audible and that you can accept blocked calls so that we can get back in touch with you! This number should be reserved for urgent needs if during the day, before 8 am, after 5 pm, weekends, holidays.    Contact the on-call CNM with warning signs, such as:  vaginal bleeding   Vaginal discharge and itching or pain and burning during urination  Leg/calf pain or swelling on one side  severe abdominal pain  nausea and vomiting more than 4-5 times a day, or if you are unable to keep anything down  fever more than 100.4 degrees F.     Prescription Eyewearhart  After each of your visits you are welcome to check [employer] for your visit summary including education and links to information relevant to your pregnancy and/or well woman care.   Find the \"Visits\" tab at the top of the page, you will see a list of recent visits and for each visit a for link for \"View After Visit Summary.\" View of your After Visit Summary will allow you to read our recommendations from your visit, review any education provided, and link to websites with useful information.   If you have any questions or difficulty navigating [employer], please feel free to contact us and we will do our best to direct you.    Meet the Midwives from [facility] [place] [facility]  You are invited to an informational meet and greet with [facility] Certified Nurse-Midwives. Our free \"Meet the " "Midwives\" event is a great opportunity to learn about our midwives' philosophy and experience, the hospitals where we can assist with your birth, and answer questions you may have. Partners, friends, and family are welcome to attend. Currently, this is a virtual event.  Dates: First Tuesday of every month at 7 pm.    Link to next (live) meeting  [email or web address].[email or web address]/classes-[email or web address]/meet-the-midwives-from-[facility]  To Join by Telephone (audio only) Call:   [phone] Phone Conference ID: [phone]#      Touring the Maternity Care Center  At this time we are offering a virtual tour of the Maternity Care Centers at both [facility] and [facility]:   [facility]: [email or web address]://www.[email or web address]/Locations/[employer]/Maternity-Care-Center  [place]:   https://Domee/overarching-[facility]/the-birthplace/tours  https://www.Domee/Locations/[facility]/Maternity-Care-Center/#virtual_tour  When in person tours become available, registrations is required. To schedule a tour at either [place] or [facility], please do so online using the following links:  [facility] - [email or web address]://www.[email or web address]/registerlist.[email or web address]?s=[email or web address]&[email or web address]&[email or web address]&p=[email or web address]&lsaqv=598&ps=1&group=37&it=1&hxn=801  [facility] - https://www.Crackle.[employer]/registerlist.asp?s=6&m=303&vs=5&p=2&vpmol=775&ps=1&group=38&it=1&oml=078      Childbirth and Parenting Education:     Everyday Miracles:   https://www.everyday-miracles.org/    Free Video Series from [facility]: https://nursing.[facility]/academics/specialty-areas/nurse-midwifery/having-baby-prenatal-videos/having-baby-prenatal-and    JAMIN parenting center: http://Companion Canine/   [phone]  [place]: (education, yoga & wellness) www.StopandWalk.com.[employer]  Enlightened Mama: www.enlightenedmama.[employer]   Childbirth collective: (Parent topic nights)  www.childbirthcollective.org/  Hypnobabies:  " www.Zao.combiestWellcentivecities.com/  Hypnobirthing:  Http://hypnobirthing.com/  The Birth Hour: https://theJDP [employer]/online-childbirth-class/    APPS and Podcasts:   Luz Joremberto Nurture    Evidence Based Birth  The Birth Hour (for birth stories)   Birthful   Expectful   The Longest Shortest Time  PregnancyPodcast Nury Maurice    Book Recommendations:   Nevin Mescalero's Birthing From Within--first few chapters include a new-age tone, you may prefer to skip it and keep going, because there is good stuff later.  This book recommendation covers emotional preparation, but does cover coping with pain, and use of both pharmacological and nonpharmacological methods.    Dr. Acevedo' The Pregnancy Book and The Birth Book--the pregnancy book goes month-by month      The Birth Partner by Kathleen Yi    Womanly Art of Breastfeeding by La Leche League International   Breastfeeding by Patricia Shore--great pictures    Mothering Your Nursing Toddler, by Aura Freitas.   Addresses dealing with so many of the challenging behaviors of a nursing toddler.  How Weaning Happens, by La Leche League.  Discusses weaning at all ages, from medically necessary weaning of an infant, all the way up to age 5 (or older), with why/why not, and strategies.  Very empowering book both for deciding to wean and deciding not to.    American College of Nurse-Midwives (ACNM) http://www.midwife.org/; look at the informational handouts at http://www.midwife.org/Share-With-Women     www.mymidwife.org    Mother to Baby (Medication and Herbal guidance in pregnancy): http://www.mothertobaby.org  Toll-Free Hotline: 916.413.7151  LactMed (Medication use while breastfeeding): http://toxnet.nlm.nih.gov/newtoxnet/lactmed.htm    Women's Health.gov:  http://www.womenshealth.gov/a-z-topics/index.html    American pregnancy association - http://americanpregnancy.org    Centering Pregnancy (group prenatal care option): http://centeringhealthcare.org    Information about  "doulas:  Childbirth collective: http://www.childbirthcollective.org/  Doulas of [place] (VICENTE):  www.vicente.org  [facility]  project: http://twincitiesdoulaproject.com/     Early Childhood and Family Education (ECFE):  ECFE offers parents hands-on learning experiences that will nourish a lifetime of teachable moments.  http://ecfe.info/ecfe-home/    March of Dimes www.Zoutons     FDA - Nutrition  www.mypyramid.gov  Under \"For Consumers,\" click on \"pregnant and breastfeeding women.\"      Centers for Disease Control and Prevention (CDC) - Vaccines : http://www.cdc.gov/vaccines/       When researching information on the web, question the validity of websites.  The [employer] .gov, .edu and.org tend to be more reliable information.  If there are a lot of advertisements, be cautious of the information provided. Stay away from blogs and chat rooms please!     Making Plans for Feeding My Baby    By this point, you probably have read a lot about feeding your baby.  Breastfeed or formula? Each parent's decision is their own and [facility] respects you and your choices. We ve gathered information on both breastfeeding and formula feeding to help with your decision. Talking with your nurse-midwife can also help in your decision.  However you plan to feed your baby, [facility] Maternity Care Centers encourage rooming in with your baby, skin-to-skin contact and feeding your baby based on his or her cues.    Skin-to-skin contact  Being close to mom helps your baby adjust to life outside of the womb.  It helps your baby regulate their body temperature, heart rate, and breathing.  Your baby will usually be placed skin-to-skin immediately following birth or as soon as possible, if medical intervention is needed.    Rooming-In  Having your baby stay with you in your room is called  rooming-in .  Keeping your baby in your room helps you to learn how to care for your baby by " getting to know your baby s cues, body rhythms and sleep cycle.       Cue-based feeding  Cues (signals) are baby s way of telling you what he or she wants.  When you learn your infant s cues, you know how to care for and feed your baby.   Feeding cues are the licking and smacking of lips, bringing their fist to their mouth, and a reflex called  rooting - where baby turns and opens his or her mouth, searching for the breast or bottle.  Crying is a late feeding cue.  Babies can feed frequently, often at least 8 times in 24 hours.    Breastfeeding facts  Breast milk is the best source of nutrition for your baby and is available at birth. In the first couple of days, your milk volume is already starting to increase, though it may not be noticeable. Breastfeed frequently to increase your milk supply. Within three to five days, you will begin to notice larger milk volumes. An increase in breast size, heaviness and firmness are often described as the milk  coming in.  Frequent breastfeeding can help breasts from getting overly firm and painful. You will know the baby is getting enough milk if your baby is having wet and dirty diapers and gaining weight.     If your goal is to exclusively breastfeed, it is important to not use any formula or artificial nipples (including bottles and pacifiers) while your baby is learning to breastfeed.  While it may seem like an  easy  option to give your baby a bottle, formula should only be given if there is a medical reason for your baby to have it.      Positioning and attachment   Get comfortable.  Use pillows as needed to support your arms and baby.  Hold baby close at the level of your breast, facing you in a tummy to tummy position.  Skin to skin helps with this.  Position the baby with his or her nose by the nipple.  There should be a straight line from baby s ear to shoulder to hips.  Tickle your baby s lips or wait for baby to open mouth wide, bring baby to breast by leading with  the chin.  Aim the nipple at the roof of baby s mouth.  A rapid sucking pattern is followed by longer, drawing pattern with occasional swallows heard.  When baby is correctly latched, your nipple and much of the areola are pulled well into baby s mouth.      Returning to work or school  Focus on a good start to breastfeeding.  Many women continue to provide breastmilk for their baby when they return to work or school.  Making plans about where to pump and store milk can make the transition go well.  Talk with other mothers who have also returned to work or school for tips and support.  Your employer s Human Resource department may be a resource as well.     Returning to work or school: (continued)   babies can mean fewer  sick  days for you.  A quality breast pump will also save time and add comfort.  Check with your insurance prior to giving birth for breast pump coverage.  Many insurance companies include a pump within your benefits.  Wait until your baby is at least three weeks old to introduce a bottle for the first time.  Have someone besides you give the bottle.  Breastfeed when you are with your baby. Reserve your bottles of breast milk for when you are away.   Your breasts will need to be  emptied  either by your baby or a pump.  Plan to pump at least twice in an eight hour day.  If you cannot pump at work, continue breastfeeding at home. Any amount of breast milk is worth giving to your baby.    Formula feeding facts  If you are planning to use formula to feed your baby, you will want to make some preparations ahead of time. Talk to your doctor or nurse-midwife about what type of formula to use. Some are iron-fortified, meaning they have extra iron in them. You will want to purchase formula and bottles before your baby is born to be sure you are ready after you return from the hospital. The [facility] do not provide formula samples to take home.    Be sure to follow formula mixing  directions closely. Regular milk in the dairy case at the grocery store should not be given to babies under 1 year old. Baby formula is sold in several forms including:  Ready-to-use. This is the most expensive, but no mixing is necessary.  Concentrated liquid. This is less expensive than ready-to-use and you mix with water.  Powder. This is the least expensive. You mix one level scoop of powdered formula with two ounces of water and stir well.    Most babies need 2.5 ounces of formula per pound of body weight each day. This means an 8-pound baby may drink about 20 ounces of formula a day; however, this is just an estimate. The most important thing is to pay attention to your baby s cues.  If your baby is always fussy, needs more iron or has certain food allergies, your physician may suggest you change your baby s formula to a different kind.     How do I warm my baby s bottles?  You may feed your baby a bottle without warming it first. It is OK for the breast milk or formula to be cool or room temperature. If your baby seems to prefer it warmed, you can put the filled bottle in a container of warm water and let it stand for a few minutes. Check the temperature of the liquid on your skin before feeding it to your baby; to be sure it isn t too hot. Do not heat bottles in the microwave. Microwaves heat food and liquids unevenly, and this can cause hot spots that can burn your baby.    How do I clean and sterilize bottles?  Sterilize bottles and nipples before you use them for the first time. You can do this by putting them in boiling water for 5 minutes. After that first time, you can wash them in hot and soapy water. Rinse them carefully to be sure there is no soap left on them. You can also wash them in the .    Breastfeeding/Chestfeeding Support  Contact us  Breastfeeding/chestfeeding is the natural and healthy way for parents to feed their babies and provides the best source of nutrition in most cases to  infants. Breast milk has health benefits for mom, too. The American Academy of Pediatrics recommends exclusively breastfeeding for 6 months for optimal growth and development and breastfeeding up to at least a year.  Benefits to baby:  Easy digestion, less diarrhea and constipation, breast milk is easy to digest.  Lots of bonding with parent.  Less likely to have asthma.  Less ear infections and respiratory infections.  Less likely to have Type 2 Diabetes.  Less likely to become obese.  Lower risk of Sudden Infant Death Syndrome (SIDS).  Benefits to breastfeeding/chestfeeding parent:  Helps with weight loss.  Lower risk of ovarian and breast cancer, Type 2 diabetes and heart disease.  /chestfed babies are easy to take on trips. Just grab the diapers and go!  Breastfeeding/chestfeeding saves money (no formula or bottle costs, fewer doctor bills and medication costs).  Ready to breastfeed/chestfeed anywhere, don t need to make and wash bottles.  Breastfeeding/chestfeeding is wonderful, but not always easy. Learning what to expect ahead of time and get support from a lactation professional. Check out the [facility] Breastfeeding Resource Guide for classes, drop in support groups, childbirth education, Doulas and clinic and hospital lactation services.     [facility] Baby Café  Due to COVID-19, all Baby Café sessions are canceled until further notice. For lactation support, please contact one of our bilingual staff:  Mikayla (IBCLC) [phone]  Carmen (IBCLC/ Burundian) [phone]  Zoua (Hmong) [phone]  Aiden (Tanzanian) [phone]  Baby Café is a free, drop-in service offering breastfeeding/chestfeeding support. Come share tips and socialize with other pregnant, breastfeeding/chestfeeding families. Babies and siblings are welcome (no  available).  We offer:  Professionally trained lactation staff.  Resource books for lending.  Relaxed and fun atmosphere.  Refreshments.  Locations  Baby Café is  "offered at several locations.  Please see below for the Baby Café closest to you.  CANCELED UNTIL FURTHER NOTICE  [facility]  [address] [address]  1st Wednesdays of the month   10 a.m. - Noon  CANCELED UNTIL FURTHER NOTICE  [facility]  [address], [place], [address]  4th Wednesdays of the month   10 a.m. - 12:30 p.m.     CANCELED UNTIL FURTHER NOTICE  [facility]  [address], [place], [address]  4th Wednesdays of the month  4 - 6 p.m.  CANCELED UNTIL FURTHER NOTICE  [facility]  [address], [place], [address]  2nd Thursdays of the month.  9:30-11:30 a.m.  Enter through the east end of the building, the blue Door C.  Ring the ECFE buzzer to be let in.   More information  Carmen Chaudhry  [phone]  [email or web address].         Virtual Breastfeeding Support:    During this time of isolation, breastfeeding families need even more community!  Here are some area organizations offering virtual support groups for breastfeeding:    [facility] Support Group, Tuesdays at 10:30 am   Run by BRETT Khan of [facility]   Go to [facility] Facebook page and click on \"events\" for link   https://www.facebook.com/events/487680186371342/  [facility] Milk Hour, Thursdays at 2:30 pm    Run by BRETT Gillespie   Go to [facility] FB page and send message to get link   https://www.[employer].com/healthfoundations/  [facility] holding virtual meetings the first Tuesday of each month, 8-9 pm, and the   Third Saturday, 10 - 11 am.  Go to [facility] FB page; message to get link [email or web address]://www.[employer].[email or web address]/LLSarahfGManpreet/?hc_location=[facility]  [employer] offers a Lactation Lounge every Friday 12pm - 1pm, run by Delphine Osborne" Leader   Sign up via link at [employer]/cbe-lactation   https://www.[employer]/cbe-lactation  [facility] is offering virtual support groups every Monday, 10:30 am - 12 pm, run by nurse BRETT   Https://www.[employer].com/events/[email or web address]/    Prenatal Breastfeeding Classes:      [employer] is offering virtual breastfeeding and  care classes:  https://www.[employer]/education-workshops  BirthEd childbirth and breastfeeding education offering virtual prenatal breastfeeding classes  Https://www.[employer].SQLstream/workshops      Preparing for your baby:   [place] Screening Program  Http://www.health.[facility]mn./newbornscreening/  [place] newborns are tested soon after birth for more than 50 hidden, rare disorders, including hearing loss and critical congenital heart disease (CCHD). This site provides resources and information for families and providers.    When to call:   Appointment line and to get a hold of CNM in clinic Monday-Friday 8 am - 5 pm:  [phone].  There are some clinics with early start times (1st appointment 7:40 am) and others with evening hours (last appointment 6:20 pm).  Most are typically open from 8 am to 5 pm.    CNM on call answering service: [phone].  Specify your hospital of choice and leave a brief message for CNM;  will then page CNM who is on call at your specified hospital and you should receive a call back with 15 minutes.  Be sure that your ringer is audible and that you can accept blocked calls so that we can get back in touch with you! This number should be reserved for urgent needs if during the day, before 8 am, after 5 pm, weekends, holidays.    Contact the on-call CNM with warning signs, such as:  vaginal bleeding   Vaginal discharge and itching or pain and burning during urination  Leg/calf pain or swelling on one side  severe abdominal pain  nausea and vomiting more than 4-5 times a day, or if you are unable to keep anything down  fever more than  100.4 degrees F.     Make plans for transportation and  as needed for when you are going to the hospital.    Ask your health care provider about vaccinations you may need following delivery. By now, you should have received a Tdap immunization to protect against pertussis or whooping cough. Fathers and family members who will be in close contact with the baby should also receive a Tdap shot at least two weeks before the expected birth of the baby if they have not had a Td (tetanus) shot for at least two years.    Your midwife may offer to check your cervix for changes. If you are past your due date, discuss the next steps leading to delivery with your midwife. If you don't start labor on your own by 41 or 42 weeks, your midwife may recommend giving you medicines to ripen your cervix and start labor.  Induction of labor: http://onlinelibrary.olmstead.com/store/10.1016/j.jmwh.2008.04.018/asset/j.jmwh.2008.04.018.pdf?v=1&t=upjo2wrc&w=[email or web address]    Tell your midwife or physician how you plan to feed your baby (breast or bottle), who you have chosen to do pediatric care for your baby, and if you have a boy, whether you have chosen to have him circumcised. You will need a car seat correctly installed in your vehicle to bring your baby home. As you start to set up the nursery at home for your baby, make sure the crib is safe. The mattress needs to fit snugly against the edges of the crib. If you can fit a soda can between the bars, they are too far apart and can allow the baby's head to caught between them.    Learn about infant care and feeding, including information about infant CPR. We recommend that you put your baby to sleep on his or her back to reduce the chance of Sudden Infant Death Syndrome (SIDS). To maintain a healthy environment in which your child can grow, it's best to keep your home smoke-free. By preparing ahead, your transition into parenthood will go smoothly for you  and your baby.    Your midwife will want to see you for a checkup 2 to 6 weeks after delivery.

## 2022-06-23 ENCOUNTER — OFFICE VISIT (OUTPATIENT)
Dept: OBGYN | Facility: CLINIC | Age: 32
End: 2022-06-23
Payer: COMMERCIAL

## 2022-06-23 VITALS
OXYGEN SATURATION: 99 % | DIASTOLIC BLOOD PRESSURE: 74 MMHG | HEART RATE: 79 BPM | BODY MASS INDEX: 29.83 KG/M2 | SYSTOLIC BLOOD PRESSURE: 118 MMHG | WEIGHT: 202 LBS

## 2022-06-23 DIAGNOSIS — Z30.09 STERILIZATION CONSULT: Primary | ICD-10-CM

## 2022-06-23 PROCEDURE — 99213 OFFICE O/P EST LOW 20 MIN: CPT | Performed by: OBSTETRICS & GYNECOLOGY

## 2022-06-23 NOTE — PROGRESS NOTES
"CC: The patient is being seen secondary to a desire for no more pregnancies.    HPI: The pt is a 32 year old MWF  at 37+1 weeks gestation who presents with knowing she doesn't want any more children.  She would like more information on sterilization.  She also has a small umbilical hernia that was last evaluated with her first pregnancy 6 years ago.  It was found during her pregnancy with her umbilicus looking \"like an outie;\" she is asymptomatic from the hernia.  Her insurance is changing to [employer] on .  Her HgB was 12.7 yesterday.    Past Medical History:   Diagnosis Date     Abnormal Pap smear of cervix      Allergic rhinitis      Cervical high risk HPV (human papillomavirus) test positive 2010     Depression      Depressive disorder      History of kidney stones      HPV (human papilloma virus) infection      Intermittent asthma      PVC's (premature ventricular contractions)        Past Surgical History:   Procedure Laterality Date     COLPOSCOPY      early 's after an abnormal pap smear     EXTRACTION(S) DENTAL       ORTHOPEDIC SURGERY       WISDOM TOOTH EXTRACTION         Patient's   Family History   Problem Relation Age of Onset     Cancer Mother 63        Salivary gland     Hypertension Father      Heart Disease Brother      No Known Problems Maternal Grandmother      Unknown/Adopted Maternal Grandfather      Kidney Disease Maternal Grandfather      Cerebrovascular Disease Paternal Grandmother      C.A.D. Paternal Grandfather         MI     Heart Disease Paternal Grandfather      No Known Problems Son      No Known Problems Maternal Aunt      No Known Problems Maternal Uncle      No Known Problems Paternal Aunt      No Known Problems Paternal Uncle      Diabetes No family hx of      Breast Cancer No family hx of      Cancer - colorectal No family hx of        Patient   Social History     Socioeconomic History     Marital status:      Spouse name: Yasmany Morejon     Number of " children: 1     Highest education level: Master's degree (e.g., MA, MS, Kathleen, MEd, MSW, LARISA)   Occupational History     Occupation:      Employer: [employer]   Tobacco Use     Smoking status: Former Smoker     Packs/day: 0.30     Years: 6.00     Pack years: 1.80     Types: Cigarettes     Smokeless tobacco: Never Used     Tobacco comment: 1-2 cigarettes per week   Substance and Sexual Activity     Alcohol use: Not Currently     Comment: social     Drug use: No     Sexual activity: Yes     Partners: Male   Other Topics Concern     Parent/sibling w/ CABG, MI or angioplasty before 65F 55M? No   Social History Narrative    Getting  in 2013.     No honeymoon planned yet       Outpatient Medications Prior to Visit   Medication Sig Dispense Refill     Prenatal Vit-Fe Fumarate-FA (PRENATAL MULTIVITAMIN W/IRON) 27-0.8 MG tablet Take 1 tablet by mouth daily       vitamin B complex with vitamin C (STRESS TAB) tablet Take 1 tablet by mouth daily        VITAMIN D, CHOLECALCIFEROL, PO Take by mouth daily        metroNIDAZOLE (FLAGYL) 250 MG tablet Take 250 mg by mouth 3 times daily (Patient not taking: No sig reported)       No facility-administered medications prior to visit.       Patient is allergic to penicillins, ampicillin sodium, other [no clinical screening - see comments], and lactalbumin.    ROS:  12 part ROS is negative aside from those symptoms in the HPI    PE:  /74 (BP Location: Right arm, Patient Position: Sitting, Cuff Size: Adult Regular)   Pulse 79   Wt 91.6 kg (202 lb)   LMP 10/09/2021           Body mass index is 29.83 kg/m .    General: gravid WF, NAD  Psych: normal mood  Neuro: CN I-XII grossly intact  MS: normal gait    Assessment: 32 year old MWF  at 37+1 weeks gestation with a desire for permanent contraception.    Plan: We first discussed LARCs.  Post partum tubal ligation and laparoscopic tubal ligation were discussed with the patient.  We discussed that most of  the time the surgery now is bilateral salpingectomy to help reduce the risk of ovarian cancer.  She was counseled on the permanence of each procedure, the approximately 1/200 failure rate and the increased risk for ectopic should pregnancy occur.  She was counseled on the pros and cons of PPTL and LTL including immediate effect but general anesthesia and abdominal incisions.  She was counseled that for the LTL she needs to wait until after her 6 week post partum check before the procedure would be done.  She was counseled that with the PPTL, if her HgB is too low or there are other complications, it won't be done.  She was also counseled that she has to be NPO for 8 hours before surgery and that the case might be delayed if more emergent cases come up.  She was counseled that if she has a  section for some reason the tubal would be done at that time.  She decided she would like a PP bilateral salpingectomy if possible.  We discussed that if the hernia isn't able to be addressed at the time of TL, she can be referred to Gen Surg at her 6 week PP appointment.    30 minutes spent on the date of the encounter doing chart review, review of test results, patient visit and documentation

## 2022-06-28 ENCOUNTER — TELEPHONE (OUTPATIENT)
Dept: CARDIOLOGY | Facility: CLINIC | Age: 32
End: 2022-06-28

## 2022-06-28 ENCOUNTER — HOSPITAL ENCOUNTER (OUTPATIENT)
Dept: CARDIOLOGY | Facility: CLINIC | Age: 32
Discharge: HOME | End: 2022-06-28
Attending: INTERNAL MEDICINE | Admitting: INTERNAL MEDICINE
Payer: COMMERCIAL

## 2022-06-28 DIAGNOSIS — R55 PRE-SYNCOPE: ICD-10-CM

## 2022-06-28 LAB — LVEF ECHO: NORMAL

## 2022-06-28 PROCEDURE — 93306 TTE W/DOPPLER COMPLETE: CPT | Mod: 26 | Performed by: INTERNAL MEDICINE

## 2022-06-28 PROCEDURE — 93306 TTE W/DOPPLER COMPLETE: CPT

## 2022-06-28 NOTE — TELEPHONE ENCOUNTER
Spoke with patient to review that her echo was read as stable and event monitor did not show any PVCs. susan states since she entered her 3rd trimester she has felt much better. She is 2 weeks away from delivery. Patient will call back if she notices further palpitations once the baby is born. Reviewed Dr. Smith's recommendation for no current changes in medications. Patient verbalized understanding and agreed with plan.      1610 signed event summary sent to scan

## 2022-06-28 NOTE — TELEPHONE ENCOUNTER
Echo 6/28/2022 noted. Ordered with 14-day event monitor to review PVC burden in the setting of pregnancy.    Echo: Technically difficult study. No contrast use. Patient is pregnant. The left ventricle is normal in structure, function and size. The right ventricle is normal in size and function. No hemodynamically significant valvular disease. There is no pericardial effusion. IVC not well visualized.    Contacted [employer] ([phone]) to send event summary from April.  Strips collected show sinus to sinus tachycardia. Patient noted 3 times that she felt skipped beats or was lightheaded.      Will message Dr. Smith to review

## 2022-06-29 ENCOUNTER — PRENATAL OFFICE VISIT (OUTPATIENT)
Dept: MIDWIFE SERVICES | Facility: CLINIC | Age: 32
End: 2022-06-29
Payer: COMMERCIAL

## 2022-06-29 VITALS
WEIGHT: 200 LBS | OXYGEN SATURATION: 99 % | BODY MASS INDEX: 29.53 KG/M2 | SYSTOLIC BLOOD PRESSURE: 118 MMHG | HEART RATE: 80 BPM | DIASTOLIC BLOOD PRESSURE: 64 MMHG

## 2022-06-29 DIAGNOSIS — Z34.80 SUPERVISION OF OTHER NORMAL PREGNANCY: Primary | ICD-10-CM

## 2022-06-29 PROCEDURE — 99207 PR PRENATAL VISIT: CPT | Performed by: ADVANCED PRACTICE MIDWIFE

## 2022-06-29 NOTE — PATIENT INSTRUCTIONS
"[facility] Nurse Midwives - Contact information:  Appointment line and to get a hold of CNM in clinic Monday-Friday 8 am - 5 pm:  [phone].  There are some clinics with early start times (1st appointment 7:40 am) and others with evening hours (last appointment 6:20 pm).  Most are typically open from 8 am to 5 pm.    CNM on call answering service: [phone].  Specify your hospital of choice and leave a brief message for CNM;  will then page CNM who is on call at your specified hospital and you should receive a call back with 15 minutes.  Be sure that your ringer is audible and that you can accept blocked calls so that we can get back in touch with you! This number should be reserved for urgent needs if during the day, before 8 am, after 5 pm, weekends, holidays.    Contact the on-call CNM with warning signs, such as:  vaginal bleeding   Vaginal discharge and itching or pain and burning during urination  Leg/calf pain or swelling on one side  severe abdominal pain  nausea and vomiting more than 4-5 times a day, or if you are unable to keep anything down  fever more than 100.4 degrees F.     [place]  After each of your visits you are welcome to check PayStand for your visit summary including education and links to information relevant to your pregnancy and/or well woman care.   Find the \"Visits\" tab at the top of the page, you will see a list of recent visits and for each visit a for link for \"View After Visit Summary.\" View of your After Visit Summary will allow you to read our recommendations from your visit, review any education provided, and link to websites with useful information.   If you have any questions or difficulty navigating [employer], please feel free to contact us and we will do our best to direct you.    Meet the Midwives from [facility]  You are invited to an informational meet and greet with [facility] Certified Nurse-Midwives. Our free \"Meet the " "Midwives\" event is a great opportunity to learn about our midwives' philosophy and experience, the hospitals where we can assist with your birth, and answer questions you may have. Partners, friends, and family are welcome to attend. Currently, this is a virtual event.  Dates: First Tuesday of every month at 7 pm.    Link to next (live) meeting  https://www.NEWLINE SOFTWARE/classes-and-events/meet-the-midwives-from-[facility]  To Join by Telephone (audio only) Call:   [phone] Phone Conference ID: [phone]#      Touring the Maternity Care Center  At this time we are offering a virtual tour of the Maternity Care Centers at both [facility] and [facility]:   [facility]: https://www.NEWLINE SOFTWARE/Locations/[facility]/Maternity-Care-Center  [place]:   https://NEWLINE SOFTWARE/overarching-[facility]/the-birthplace/tours  https://www.NEWLINE SOFTWARE/Locations/[facility]/Maternity-Care-Center/#virtual_tour  When in person tours become available, registrations is required. To schedule a tour at either [place] or [facility], please do so online using the following links:  [facility] - [email or web address]://www.[email or web address] Games/registerlist.[email or web address]?s=[email or web address]&[email or web address]&vs=5&p=[email or web address]&isrow=495&ps=1&group=37&it=1&iuc=528  [place] - https://www.Rewalon.Thrillist.com/registerlist.asp?s=[email or web address]&m=303&vs=5&p=[email or web address]&epgoy=318&ps=1&group=38&it=1&bsa=799      Childbirth and Parenting Education:     Everyday Miracles:   https://www.everyday-miracles.org/    Free Video Series from [facility]: https://nursing.[facility]/academics/specialty-areas/nurse-midwifery/having-baby-prenatal-videos/having-baby-prenatal-and    JAMIN parenting center: http://Inkblazers/   [phone]  [place]: (education, yoga & wellness) www.Cynergen.Thrillist.com  Enlightened Mama: www.enlightenedmama.Thrillist.com   Childbirth collective: (Parent topic nights)  www.childbirthcollective.org/  Hypnobabies:  " www.Overture ServicesbiestNefsiscities.com/  Hypnobirthing:  Http://hypnobirthing.com/  The Birth Hour: https://theInstaclustr/online-childbirth-class/    APPS and Podcasts:   Luz Joremberto Nurture    Evidence Based Birth  The Birth Hour (for birth stories)   Birthful   Expectful   The Longest Shortest Time  PregnancyPodcast Nury Maurice    Book Recommendations:   Nevin Dorchester's Birthing From Within--first few chapters include a new-age tone, you may prefer to skip it and keep going, because there is good stuff later.  This book recommendation covers emotional preparation, but does cover coping with pain, and use of both pharmacological and nonpharmacological methods.    Dr. Acevedo' The Pregnancy Book and The Birth Book--the pregnancy book goes month-by month      The Birth Partner by Kathleen Yi    Womanly Art of Breastfeeding by La Leche League International   Breastfeeding by Patricia Shore--great pictures    Mothering Your Nursing Toddler, by Aura Freitas.   Addresses dealing with so many of the challenging behaviors of a nursing toddler.  How Weaning Happens, by La Leche League.  Discusses weaning at all ages, from medically necessary weaning of an infant, all the way up to age 5 (or older), with why/why not, and strategies.  Very empowering book both for deciding to wean and deciding not to.    American College of Nurse-Midwives (ACNM) http://www.midwife.org/; look at the informational handouts at http://www.midwife.org/Share-With-Women     www.mymidwife.org    Mother to Baby (Medication and Herbal guidance in pregnancy): http://www.mothertobaby.org  Toll-Free Hotline: 737.529.8487  LactMed (Medication use while breastfeeding): http://toxnet.nlm.nih.gov/newtoxnet/lactmed.htm    Women's Health.gov:  http://www.womenshealth.gov/a-z-topics/index.html    American pregnancy association - http://americanpregnancy.org    Centering Pregnancy (group prenatal care option): http://centeringhealthcare.org    Information about  "doulas:  Childbirth collective: http://www.childbirthcollective.org/  Doulas of [place] (VICENTE):  www.vicente.org  [facility]  project: http://twincitiesdoulaproject.com/     Early Childhood and Family Education (ECFE):  ECFE offers parents hands-on learning experiences that will nourish a lifetime of teachable moments.  http://ecfe.info/ecfe-home/    March of Dimes www.[employer]     FDA - Nutrition  www.mypyramid.gov  Under \"For Consumers,\" click on \"pregnant and breastfeeding women.\"      Centers for Disease Control and Prevention (CDC) - Vaccines : http://www.cdc.gov/vaccines/       When researching information on the web, question the validity of websites.  The ArmorText .gov, .edu and.org tend to be more reliable information.  If there are a lot of advertisements, be cautious of the information provided. Stay away from blogs and chat rooms please!     Making Plans for Feeding My Baby    By this point, you probably have read a lot about feeding your baby.  Breastfeed or formula? Each parent's decision is their own and [facility] Nurse [facility]wives [facility] respects you and your choices. We ve gathered information on both breastfeeding and formula feeding to help with your decision. Talking with your nurse-midwife can also help in your decision.  However you plan to feed your baby, [facility] Maternity Care Centers encourage rooming in with your baby, skin-to-skin contact and feeding your baby based on his or her cues.    Skin-to-skin contact  Being close to mom helps your baby adjust to life outside of the womb.  It helps your baby regulate their body temperature, heart rate, and breathing.  Your baby will usually be placed skin-to-skin immediately following birth or as soon as possible, if medical intervention is needed.    Rooming-In  Having your baby stay with you in your room is called  rooming-in .  Keeping your baby in your room helps you to learn how to care for your baby by " getting to know your baby s cues, body rhythms and sleep cycle.       Cue-based feeding  Cues (signals) are baby s way of telling you what he or she wants.  When you learn your infant s cues, you know how to care for and feed your baby.   Feeding cues are the licking and smacking of lips, bringing their fist to their mouth, and a reflex called  rooting - where baby turns and opens his or her mouth, searching for the breast or bottle.  Crying is a late feeding cue.  Babies can feed frequently, often at least 8 times in 24 hours.    Breastfeeding facts  Breast milk is the best source of nutrition for your baby and is available at birth. In the first couple of days, your milk volume is already starting to increase, though it may not be noticeable. Breastfeed frequently to increase your milk supply. Within three to five days, you will begin to notice larger milk volumes. An increase in breast size, heaviness and firmness are often described as the milk  coming in.  Frequent breastfeeding can help breasts from getting overly firm and painful. You will know the baby is getting enough milk if your baby is having wet and dirty diapers and gaining weight.     If your goal is to exclusively breastfeed, it is important to not use any formula or artificial nipples (including bottles and pacifiers) while your baby is learning to breastfeed.  While it may seem like an  easy  option to give your baby a bottle, formula should only be given if there is a medical reason for your baby to have it.      Positioning and attachment   Get comfortable.  Use pillows as needed to support your arms and baby.  Hold baby close at the level of your breast, facing you in a tummy to tummy position.  Skin to skin helps with this.  Position the baby with his or her nose by the nipple.  There should be a straight line from baby s ear to shoulder to hips.  Tickle your baby s lips or wait for baby to open mouth wide, bring baby to breast by leading with  the chin.  Aim the nipple at the roof of baby s mouth.  A rapid sucking pattern is followed by longer, drawing pattern with occasional swallows heard.  When baby is correctly latched, your nipple and much of the areola are pulled well into baby s mouth.      Returning to work or school  Focus on a good start to breastfeeding.  Many women continue to provide breastmilk for their baby when they return to work or school.  Making plans about where to pump and store milk can make the transition go well.  Talk with other mothers who have also returned to work or school for tips and support.  Your employer s Human Resource department may be a resource as well.     Returning to work or school: (continued)   babies can mean fewer  sick  days for you.  A quality breast pump will also save time and add comfort.  Check with your insurance prior to giving birth for breast pump coverage.  Many insurance companies include a pump within your benefits.  Wait until your baby is at least three weeks old to introduce a bottle for the first time.  Have someone besides you give the bottle.  Breastfeed when you are with your baby. Reserve your bottles of breast milk for when you are away.   Your breasts will need to be  emptied  either by your baby or a pump.  Plan to pump at least twice in an eight hour day.  If you cannot pump at work, continue breastfeeding at home. Any amount of breast milk is worth giving to your baby.    Formula feeding facts  If you are planning to use formula to feed your baby, you will want to make some preparations ahead of time. Talk to your doctor or nurse-midwife about what type of formula to use. Some are iron-fortified, meaning they have extra iron in them. You will want to purchase formula and bottles before your baby is born to be sure you are ready after you return from the hospital. [facility] do not provide formula samples to take home.    Be sure to follow formula mixing  directions closely. Regular milk in the dairy case at the grocery store should not be given to babies under 1 year old. Baby formula is sold in several forms including:  Ready-to-use. This is the most expensive, but no mixing is necessary.  Concentrated liquid. This is less expensive than ready-to-use and you mix with water.  Powder. This is the least expensive. You mix one level scoop of powdered formula with two ounces of water and stir well.    Most babies need 2.5 ounces of formula per pound of body weight each day. This means an 8-pound baby may drink about 20 ounces of formula a day; however, this is just an estimate. The most important thing is to pay attention to your baby s cues.  If your baby is always fussy, needs more iron or has certain food allergies, your physician may suggest you change your baby s formula to a different kind.     How do I warm my baby s bottles?  You may feed your baby a bottle without warming it first. It is OK for the breast milk or formula to be cool or room temperature. If your baby seems to prefer it warmed, you can put the filled bottle in a container of warm water and let it stand for a few minutes. Check the temperature of the liquid on your skin before feeding it to your baby; to be sure it isn t too hot. Do not heat bottles in the microwave. Microwaves heat food and liquids unevenly, and this can cause hot spots that can burn your baby.    How do I clean and sterilize bottles?  Sterilize bottles and nipples before you use them for the first time. You can do this by putting them in boiling water for 5 minutes. After that first time, you can wash them in hot and soapy water. Rinse them carefully to be sure there is no soap left on them. You can also wash them in the .    Breastfeeding/Chestfeeding Support  Contact us  Breastfeeding/chestfeeding is the natural and healthy way for parents to feed their babies and provides the best source of nutrition in most cases to  infants. Breast milk has health benefits for mom, too. The American Academy of Pediatrics recommends exclusively breastfeeding for 6 months for optimal growth and development and breastfeeding up to at least a year.  Benefits to baby:  Easy digestion, less diarrhea and constipation, breast milk is easy to digest.  Lots of bonding with parent.  Less likely to have asthma.  Less ear infections and respiratory infections.  Less likely to have Type 2 Diabetes.  Less likely to become obese.  Lower risk of Sudden Infant Death Syndrome (SIDS).  Benefits to breastfeeding/chestfeeding parent:  Helps with weight loss.  Lower risk of ovarian and breast cancer, Type 2 diabetes and heart disease.  /chestfed babies are easy to take on trips. Just grab the diapers and go!  Breastfeeding/chestfeeding saves money (no formula or bottle costs, fewer doctor bills and medication costs).  Ready to breastfeed/chestfeed anywhere, don t need to make and wash bottles.  Breastfeeding/chestfeeding is wonderful, but not always easy. Learning what to expect ahead of time and get support from a lactation professional. Check out the [facility] Breastfeeding Resource Guide for classes, drop in support groups, childbirth education, Doulas and clinic and hospital lactation services.     [facility] Baby Café  Due to COVID-19, all Baby Café sessions are canceled until further notice. For lactation support, please contact one of our bilingual staff:  Mikayla (IBCLC) [phone]  Carmen (IBCLC/ Comoran) [phone]  Zoua (Hmong) [phone]  Aiden (Northern Irish) [phone]  Baby Café is a free, drop-in service offering breastfeeding/chestfeeding support. Come share tips and socialize with other pregnant, breastfeeding/chestfeeding families. Babies and siblings are welcome (no  available).  We offer:  Professionally trained lactation staff.  Resource books for lending.  Relaxed and fun atmosphere.  Refreshments.  Locations  Baby Café is  "offered at several locations.  Please see below for the Baby Café closest to you.  CANCELED UNTIL FURTHER NOTICE  [facility]  [address] [address]  1st Wednesdays of the month   10 a.m. - Noon  CANCELED UNTIL FURTHER NOTICE  [facility]  [address], [place], [address]  4th Wednesdays of the month   10 a.m. - 12:30 p.m.     CANCELED UNTIL FURTHER NOTICE  [facility]  [address], [place], [address]  4th Wednesdays of the month  4 - 6 p.m.  CANCELED UNTIL FURTHER NOTICE  [facility]  [address], [place], [address]  2nd Thursdays of the month.  9:30-11:30 a.m.  Enter through the east end of the building, the blue Door C.  Ring the ECFE buzzer to be let in.   More information  Carmen Chaudhry  [phone]  [email or web address]         Virtual Breastfeeding Support:    During this time of isolation, breastfeeding families need even more community!  Here are some area organizations offering virtual support groups for breastfeeding:    [facility] Support Group, Tuesdays at 10:30 am   Run by BRETT Khan of [facility]   Go to [facility] Facebook page and click on \"events\" for link   https://www.facebook.com/events/880551878703929/  [facility] Milk Hour, Thursdays at 2:30 pm    Run by BRETT Gillespie   Go to [facility] FB page and send message to get link   https://www.[email or web address]/healthfoundations/  [facility]/[place] holding virtual meetings the first Tuesday of each month, 8-9 pm, and the   Third Saturday, 10 - 11 am.  Go to [facility] FB page; message to get link [email or web address]/[email or web address]/?hc_location=[facility]  [facility] offers a Lactation Lounge every Friday 12pm - 1pm, run by Delphine Osborne" Leader   Sign up via link at CE Info Systems/cbe-lactation   https://www.CE Info Systems/cbe-lactation  [facility] is offering virtual support groups every Monday, 10:30 am - 12 pm, run by nurse BRETT   Https://www.[email or web address]/events/[email or web address]/    Prenatal Breastfeeding Classes:      [facility] is offering virtual breastfeeding and  care classes:  https://www.CE Info Systems/education-workshops  BirthEd childbirth and breastfeeding education offering virtual prenatal breastfeeding classes  Https://www.Wefunder.Expert Planet/workshops      Preparing for your baby:   [place] Screening Program  Http://www.health.[facility].mn./newbornscreening/  [place] newborns are tested soon after birth for more than 50 hidden, rare disorders, including hearing loss and critical congenital heart disease (CCHD). This site provides resources and information for families and providers.    When to call:   Appointment line and to get a hold of CNM in clinic Monday-Friday 8 am - 5 pm:  [phone].  There are some clinics with early start times (1st appointment 7:40 am) and others with evening hours (last appointment 6:20 pm).  Most are typically open from 8 am to 5 pm.    CNM on call answering service: [phone].  Specify your hospital of choice and leave a brief message for CNM;  will then page CNM who is on call at your specified hospital and you should receive a call back with 15 minutes.  Be sure that your ringer is audible and that you can accept blocked calls so that we can get back in touch with you! This number should be reserved for urgent needs if during the day, before 8 am, after 5 pm, weekends, holidays.    Contact the on-call CNM with warning signs, such as:  vaginal bleeding   Vaginal discharge and itching or pain and burning during urination  Leg/calf pain or swelling on one side  severe abdominal pain  nausea and vomiting more than 4-5 times a day, or if you are unable to keep anything down  fever more than  100.4 degrees F.     Make plans for transportation and  as needed for when you are going to the hospital.    Ask your health care provider about vaccinations you may need following delivery. By now, you should have received a Tdap immunization to protect against pertussis or whooping cough. Fathers and family members who will be in close contact with the baby should also receive a Tdap shot at least two weeks before the expected birth of the baby if they have not had a Td (tetanus) shot for at least two years.    Your midwife may offer to check your cervix for changes. If you are past your due date, discuss the next steps leading to delivery with your midwife. If you don't start labor on your own by 41 or 42 weeks, your midwife may recommend giving you medicines to ripen your cervix and start labor.  Induction of labor: http://onlinelibrary.olmstead.com/store/10.1016/j.jmwh.2008.04.018/asset/j.jmwh.2008.04.018.pdf?v=1&t=txov1fzm&o=[email or web address]    Tell your midwife or physician how you plan to feed your baby (breast or bottle), who you have chosen to do pediatric care for your baby, and if you have a boy, whether you have chosen to have him circumcised. You will need a car seat correctly installed in your vehicle to bring your baby home. As you start to set up the nursery at home for your baby, make sure the crib is safe. The mattress needs to fit snugly against the edges of the crib. If you can fit a soda can between the bars, they are too far apart and can allow the baby's head to caught between them.    Learn about infant care and feeding, including information about infant CPR. We recommend that you put your baby to sleep on his or her back to reduce the chance of Sudden Infant Death Syndrome (SIDS). To maintain a healthy environment in which your child can grow, it's best to keep your home smoke-free. By preparing ahead, your transition into parenthood will go smoothly for you  and your baby.    Your midwife will want to see you for a checkup 2 to 6 weeks after delivery.

## 2022-06-29 NOTE — PROGRESS NOTES
"Margarita presents with Alma Rosa for a routine PNV at 38w 0d.  Feeling much better!  Finally sleeping.  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  Discussed and reviewed maternal growth chart and marked pregnancy checklist items.  Alma Rosa has all summer off work, as well as October! Margarita will have 18 months off work.  Lots of family and neighbors to help. Plans BF, no hx of BF difficulties.  RTC 1 week, sooner as needed.  Has CNM numbers and aware to call with DFM, LOF, labor, or any questions or concerns.    GAURAV Pierre CNM, HAWA, CLC  6/29/2022 5:57 AM     "

## 2022-06-30 ENCOUNTER — TELEPHONE (OUTPATIENT)
Dept: CARDIOLOGY | Facility: CLINIC | Age: 32
End: 2022-06-30

## 2022-07-07 ENCOUNTER — PRENATAL OFFICE VISIT (OUTPATIENT)
Dept: MIDWIFE SERVICES | Facility: CLINIC | Age: 32
End: 2022-07-07
Payer: COMMERCIAL

## 2022-07-07 VITALS
DIASTOLIC BLOOD PRESSURE: 62 MMHG | SYSTOLIC BLOOD PRESSURE: 102 MMHG | WEIGHT: 200 LBS | HEART RATE: 84 BPM | BODY MASS INDEX: 29.53 KG/M2

## 2022-07-07 DIAGNOSIS — Z34.80 SUPERVISION OF OTHER NORMAL PREGNANCY: Primary | ICD-10-CM

## 2022-07-07 DIAGNOSIS — Z67.91 RH NEGATIVE STATE IN ANTEPARTUM PERIOD: ICD-10-CM

## 2022-07-07 DIAGNOSIS — O26.899 RH NEGATIVE STATE IN ANTEPARTUM PERIOD: ICD-10-CM

## 2022-07-07 PROBLEM — Z36.89 ENCOUNTER FOR TRIAGE IN PREGNANT PATIENT: Status: RESOLVED | Noted: 2022-06-18 | Resolved: 2022-07-07

## 2022-07-07 PROCEDURE — 99207 PR PRENATAL VISIT: CPT | Performed by: ADVANCED PRACTICE MIDWIFE

## 2022-07-07 RX ORDER — ZINC GLUCONATE 50 MG
50 TABLET ORAL DAILY
COMMUNITY
End: 2024-06-20

## 2022-07-07 NOTE — PROGRESS NOTES
"Margarita presents to the clinic with Yasmany.  Eager for baby's arrival!  Declines SVE.  Reviewed expectant management with fetal surveillance versus IOL at 41+0 weeks as well as the option of elective IOL at 39+0 weeks with a ripened cervix.  Margarita desires SVE next week and to schedule IOL if undelivered in 1 week.  \"I would like to make it to my due date [before scheduling an induction of labor].\"  Total weight gain WNL at 24 pounds with pregravid BMI 25.98.  Baby is active, and she denies regular uterine contractions, loss of fluid or vaginal bleeding.  GBS NEGATIVE and 36-week hemoglobin 12.7 g/dL.  Term labor precautions and danger signs and symptoms reviewed.  All questions answered.  Encouraged daily fetal movement counting and to call or return to clinic with any questions, concerns, or as needed.  "

## 2022-07-08 ENCOUNTER — TELEPHONE (OUTPATIENT)
Dept: MIDWIFE SERVICES | Facility: CLINIC | Age: 32
End: 2022-07-08

## 2022-07-08 NOTE — TELEPHONE ENCOUNTER
Pt had spoke to Midwife briefly at appointment yesterday about setting up an induction. Pt would like to speak to KZ if possible today to set that up.

## 2022-07-12 ENCOUNTER — ANESTHESIA (OUTPATIENT)
Dept: OBGYN | Facility: CLINIC | Age: 32
End: 2022-07-12
Payer: COMMERCIAL

## 2022-07-12 ENCOUNTER — ANESTHESIA EVENT (OUTPATIENT)
Dept: OBGYN | Facility: CLINIC | Age: 32
End: 2022-07-12
Payer: COMMERCIAL

## 2022-07-12 ENCOUNTER — TELEPHONE (OUTPATIENT)
Dept: MIDWIFE SERVICES | Facility: CLINIC | Age: 32
End: 2022-07-12

## 2022-07-12 ENCOUNTER — HOSPITAL ENCOUNTER (INPATIENT)
Facility: CLINIC | Age: 32
LOS: 2 days | Discharge: HOME HEALTH | End: 2022-07-14
Attending: ADVANCED PRACTICE MIDWIFE | Admitting: ADVANCED PRACTICE MIDWIFE
Payer: COMMERCIAL

## 2022-07-12 ENCOUNTER — HOSPITAL ENCOUNTER (OUTPATIENT)
Facility: CLINIC | Age: 32
Discharge: HOME | End: 2022-07-12
Attending: ADVANCED PRACTICE MIDWIFE | Admitting: ADVANCED PRACTICE MIDWIFE
Payer: COMMERCIAL

## 2022-07-12 VITALS
SYSTOLIC BLOOD PRESSURE: 127 MMHG | DIASTOLIC BLOOD PRESSURE: 74 MMHG | HEART RATE: 74 BPM | RESPIRATION RATE: 16 BRPM | TEMPERATURE: 98.6 F | HEIGHT: 69 IN | WEIGHT: 200 LBS | BODY MASS INDEX: 29.62 KG/M2

## 2022-07-12 PROBLEM — Z36.89 ENCOUNTER FOR TRIAGE IN PREGNANT PATIENT: Status: RESOLVED | Noted: 2022-07-12 | Resolved: 2022-07-12

## 2022-07-12 PROBLEM — Z36.89 ENCOUNTER FOR TRIAGE IN PREGNANT PATIENT: Status: ACTIVE | Noted: 2022-07-12

## 2022-07-12 PROBLEM — O42.90 AMNIOTIC FLUID LEAKING: Status: ACTIVE | Noted: 2022-07-12

## 2022-07-12 LAB
ABO/RH(D): NORMAL
ANTIBODY SCREEN: NEGATIVE
ERYTHROCYTE [DISTWIDTH] IN BLOOD BY AUTOMATED COUNT: 13.4 % (ref 10–15)
HCT VFR BLD AUTO: 34.6 % (ref 35–47)
HGB BLD-MCNC: 12 G/DL (ref 11.7–15.7)
MCH RBC QN AUTO: 30.7 PG (ref 26.5–33)
MCHC RBC AUTO-ENTMCNC: 34.7 G/DL (ref 31.5–36.5)
MCV RBC AUTO: 89 FL (ref 78–100)
PLATELET # BLD AUTO: 265 10E3/UL (ref 150–450)
RBC # BLD AUTO: 3.91 10E6/UL (ref 3.8–5.2)
RUPTURE OF FETAL MEMBRANES BY ROM PLUS: POSITIVE
SARS-COV-2 RNA RESP QL NAA+PROBE: NEGATIVE
SPECIMEN EXPIRATION DATE: NORMAL
WBC # BLD AUTO: 8.2 10E3/UL (ref 4–11)

## 2022-07-12 PROCEDURE — 250N000009 HC RX 250: Performed by: ANESTHESIOLOGY

## 2022-07-12 PROCEDURE — 722N000001 HC LABOR CARE VAGINAL DELIVERY SINGLE

## 2022-07-12 PROCEDURE — 84112 EVAL AMNIOTIC FLUID PROTEIN: CPT | Performed by: ADVANCED PRACTICE MIDWIFE

## 2022-07-12 PROCEDURE — 370N000003 HC ANESTHESIA WARD SERVICE

## 2022-07-12 PROCEDURE — 85014 HEMATOCRIT: CPT | Performed by: ADVANCED PRACTICE MIDWIFE

## 2022-07-12 PROCEDURE — 250N000011 HC RX IP 250 OP 636: Performed by: ANESTHESIOLOGY

## 2022-07-12 PROCEDURE — 86901 BLOOD TYPING SEROLOGIC RH(D): CPT | Performed by: ADVANCED PRACTICE MIDWIFE

## 2022-07-12 PROCEDURE — 258N000003 HC RX IP 258 OP 636: Performed by: ADVANCED PRACTICE MIDWIFE

## 2022-07-12 PROCEDURE — 3E033VJ INTRODUCTION OF OTHER HORMONE INTO PERIPHERAL VEIN, PERCUTANEOUS APPROACH: ICD-10-PCS | Performed by: ADVANCED PRACTICE MIDWIFE

## 2022-07-12 PROCEDURE — 10907ZC DRAINAGE OF AMNIOTIC FLUID, THERAPEUTIC FROM PRODUCTS OF CONCEPTION, VIA NATURAL OR ARTIFICIAL OPENING: ICD-10-PCS | Performed by: ADVANCED PRACTICE MIDWIFE

## 2022-07-12 PROCEDURE — U0005 INFEC AGEN DETEC AMPLI PROBE: HCPCS | Performed by: ADVANCED PRACTICE MIDWIFE

## 2022-07-12 PROCEDURE — C9803 HOPD COVID-19 SPEC COLLECT: HCPCS

## 2022-07-12 PROCEDURE — 250N000009 HC RX 250: Performed by: ADVANCED PRACTICE MIDWIFE

## 2022-07-12 PROCEDURE — 00HU33Z INSERTION OF INFUSION DEVICE INTO SPINAL CANAL, PERCUTANEOUS APPROACH: ICD-10-PCS | Performed by: ANESTHESIOLOGY

## 2022-07-12 PROCEDURE — 3E0R3BZ INTRODUCTION OF ANESTHETIC AGENT INTO SPINAL CANAL, PERCUTANEOUS APPROACH: ICD-10-PCS | Performed by: ANESTHESIOLOGY

## 2022-07-12 PROCEDURE — 86780 TREPONEMA PALLIDUM: CPT | Performed by: ADVANCED PRACTICE MIDWIFE

## 2022-07-12 PROCEDURE — 120N000001 HC R&B MED SURG/OB

## 2022-07-12 PROCEDURE — G0463 HOSPITAL OUTPT CLINIC VISIT: HCPCS

## 2022-07-12 RX ORDER — FENTANYL CITRATE-0.9 % NACL/PF 10 MCG/ML
100 PLASTIC BAG, INJECTION (ML) INTRAVENOUS EVERY 5 MIN PRN
Status: DISCONTINUED | OUTPATIENT
Start: 2022-07-12 | End: 2022-07-13 | Stop reason: HOSPADM

## 2022-07-12 RX ORDER — ONDANSETRON 2 MG/ML
4 INJECTION INTRAMUSCULAR; INTRAVENOUS EVERY 6 HOURS PRN
Status: DISCONTINUED | OUTPATIENT
Start: 2022-07-12 | End: 2022-07-13 | Stop reason: HOSPADM

## 2022-07-12 RX ORDER — OXYTOCIN/0.9 % SODIUM CHLORIDE 30/500 ML
340 PLASTIC BAG, INJECTION (ML) INTRAVENOUS CONTINUOUS PRN
Status: COMPLETED | OUTPATIENT
Start: 2022-07-12 | End: 2022-07-12

## 2022-07-12 RX ORDER — MISOPROSTOL 200 UG/1
800 TABLET ORAL
Status: DISCONTINUED | OUTPATIENT
Start: 2022-07-12 | End: 2022-07-13 | Stop reason: HOSPADM

## 2022-07-12 RX ORDER — NALOXONE HYDROCHLORIDE 0.4 MG/ML
0.2 INJECTION, SOLUTION INTRAMUSCULAR; INTRAVENOUS; SUBCUTANEOUS
Status: DISCONTINUED | OUTPATIENT
Start: 2022-07-12 | End: 2022-07-13 | Stop reason: HOSPADM

## 2022-07-12 RX ORDER — KETOROLAC TROMETHAMINE 30 MG/ML
30 INJECTION, SOLUTION INTRAMUSCULAR; INTRAVENOUS
Status: DISCONTINUED | OUTPATIENT
Start: 2022-07-12 | End: 2022-07-14 | Stop reason: HOSPADM

## 2022-07-12 RX ORDER — IBUPROFEN 800 MG/1
800 TABLET, FILM COATED ORAL
Status: DISCONTINUED | OUTPATIENT
Start: 2022-07-12 | End: 2022-07-14 | Stop reason: HOSPADM

## 2022-07-12 RX ORDER — PROCHLORPERAZINE 25 MG
25 SUPPOSITORY, RECTAL RECTAL EVERY 12 HOURS PRN
Status: DISCONTINUED | OUTPATIENT
Start: 2022-07-12 | End: 2022-07-13 | Stop reason: HOSPADM

## 2022-07-12 RX ORDER — LIDOCAINE 40 MG/G
CREAM TOPICAL
Status: DISCONTINUED | OUTPATIENT
Start: 2022-07-12 | End: 2022-07-13 | Stop reason: HOSPADM

## 2022-07-12 RX ORDER — METOCLOPRAMIDE HYDROCHLORIDE 5 MG/ML
10 INJECTION INTRAMUSCULAR; INTRAVENOUS EVERY 6 HOURS PRN
Status: DISCONTINUED | OUTPATIENT
Start: 2022-07-12 | End: 2022-07-13 | Stop reason: HOSPADM

## 2022-07-12 RX ORDER — METOCLOPRAMIDE 10 MG/1
10 TABLET ORAL EVERY 6 HOURS PRN
Status: DISCONTINUED | OUTPATIENT
Start: 2022-07-12 | End: 2022-07-13 | Stop reason: HOSPADM

## 2022-07-12 RX ORDER — NALOXONE HYDROCHLORIDE 0.4 MG/ML
0.4 INJECTION, SOLUTION INTRAMUSCULAR; INTRAVENOUS; SUBCUTANEOUS
Status: DISCONTINUED | OUTPATIENT
Start: 2022-07-12 | End: 2022-07-13 | Stop reason: HOSPADM

## 2022-07-12 RX ORDER — MISOPROSTOL 200 UG/1
400 TABLET ORAL
Status: DISCONTINUED | OUTPATIENT
Start: 2022-07-12 | End: 2022-07-13 | Stop reason: HOSPADM

## 2022-07-12 RX ORDER — OXYTOCIN 10 [USP'U]/ML
10 INJECTION, SOLUTION INTRAMUSCULAR; INTRAVENOUS
Status: DISCONTINUED | OUTPATIENT
Start: 2022-07-12 | End: 2022-07-14 | Stop reason: HOSPADM

## 2022-07-12 RX ORDER — OXYTOCIN/0.9 % SODIUM CHLORIDE 30/500 ML
1-24 PLASTIC BAG, INJECTION (ML) INTRAVENOUS CONTINUOUS
Status: DISCONTINUED | OUTPATIENT
Start: 2022-07-12 | End: 2022-07-13 | Stop reason: HOSPADM

## 2022-07-12 RX ORDER — CARBOPROST TROMETHAMINE 250 UG/ML
250 INJECTION, SOLUTION INTRAMUSCULAR
Status: DISCONTINUED | OUTPATIENT
Start: 2022-07-12 | End: 2022-07-13 | Stop reason: HOSPADM

## 2022-07-12 RX ORDER — LIDOCAINE 40 MG/G
CREAM TOPICAL
Status: DISCONTINUED | OUTPATIENT
Start: 2022-07-12 | End: 2022-07-12 | Stop reason: HOSPADM

## 2022-07-12 RX ORDER — METHYLERGONOVINE MALEATE 0.2 MG/ML
200 INJECTION INTRAVENOUS
Status: DISCONTINUED | OUTPATIENT
Start: 2022-07-12 | End: 2022-07-13 | Stop reason: HOSPADM

## 2022-07-12 RX ORDER — TERBUTALINE SULFATE 1 MG/ML
0.25 INJECTION, SOLUTION SUBCUTANEOUS
Status: DISCONTINUED | OUTPATIENT
Start: 2022-07-12 | End: 2022-07-13 | Stop reason: HOSPADM

## 2022-07-12 RX ORDER — CITRIC ACID/SODIUM CITRATE 334-500MG
30 SOLUTION, ORAL ORAL
Status: DISCONTINUED | OUTPATIENT
Start: 2022-07-12 | End: 2022-07-13 | Stop reason: HOSPADM

## 2022-07-12 RX ORDER — OXYTOCIN/0.9 % SODIUM CHLORIDE 30/500 ML
100-340 PLASTIC BAG, INJECTION (ML) INTRAVENOUS CONTINUOUS PRN
Status: DISCONTINUED | OUTPATIENT
Start: 2022-07-12 | End: 2022-07-14 | Stop reason: HOSPADM

## 2022-07-12 RX ORDER — FENTANYL CITRATE 50 UG/ML
50 INJECTION, SOLUTION INTRAMUSCULAR; INTRAVENOUS EVERY 30 MIN PRN
Status: DISCONTINUED | OUTPATIENT
Start: 2022-07-12 | End: 2022-07-13 | Stop reason: HOSPADM

## 2022-07-12 RX ORDER — ONDANSETRON 4 MG/1
4 TABLET, ORALLY DISINTEGRATING ORAL EVERY 6 HOURS PRN
Status: DISCONTINUED | OUTPATIENT
Start: 2022-07-12 | End: 2022-07-13 | Stop reason: HOSPADM

## 2022-07-12 RX ORDER — OXYTOCIN 10 [USP'U]/ML
10 INJECTION, SOLUTION INTRAMUSCULAR; INTRAVENOUS
Status: DISCONTINUED | OUTPATIENT
Start: 2022-07-12 | End: 2022-07-13 | Stop reason: HOSPADM

## 2022-07-12 RX ORDER — SODIUM CHLORIDE, SODIUM LACTATE, POTASSIUM CHLORIDE, CALCIUM CHLORIDE 600; 310; 30; 20 MG/100ML; MG/100ML; MG/100ML; MG/100ML
INJECTION, SOLUTION INTRAVENOUS CONTINUOUS PRN
Status: DISCONTINUED | OUTPATIENT
Start: 2022-07-12 | End: 2022-07-13 | Stop reason: HOSPADM

## 2022-07-12 RX ORDER — NALBUPHINE HYDROCHLORIDE 10 MG/ML
2.5-5 INJECTION, SOLUTION INTRAMUSCULAR; INTRAVENOUS; SUBCUTANEOUS EVERY 6 HOURS PRN
Status: DISCONTINUED | OUTPATIENT
Start: 2022-07-12 | End: 2022-07-14 | Stop reason: HOSPADM

## 2022-07-12 RX ORDER — LIDOCAINE HYDROCHLORIDE AND EPINEPHRINE 15; 5 MG/ML; UG/ML
INJECTION, SOLUTION EPIDURAL PRN
Status: DISCONTINUED | OUTPATIENT
Start: 2022-07-12 | End: 2022-07-12

## 2022-07-12 RX ORDER — PROCHLORPERAZINE MALEATE 10 MG
10 TABLET ORAL EVERY 6 HOURS PRN
Status: DISCONTINUED | OUTPATIENT
Start: 2022-07-12 | End: 2022-07-13 | Stop reason: HOSPADM

## 2022-07-12 RX ADMIN — LIDOCAINE HYDROCHLORIDE,EPINEPHRINE BITARTRATE 5 ML: 15; .005 INJECTION, SOLUTION EPIDURAL; INFILTRATION; INTRACAUDAL; PERINEURAL at 18:23

## 2022-07-12 RX ADMIN — LIDOCAINE HYDROCHLORIDE,EPINEPHRINE BITARTRATE 3 ML: 15; .005 INJECTION, SOLUTION EPIDURAL; INFILTRATION; INTRACAUDAL; PERINEURAL at 18:17

## 2022-07-12 RX ADMIN — SODIUM CHLORIDE, POTASSIUM CHLORIDE, SODIUM LACTATE AND CALCIUM CHLORIDE: 600; 310; 30; 20 INJECTION, SOLUTION INTRAVENOUS at 13:46

## 2022-07-12 RX ADMIN — Medication: at 18:18

## 2022-07-12 RX ADMIN — Medication 2 MILLI-UNITS/MIN: at 13:56

## 2022-07-12 RX ADMIN — LIDOCAINE HYDROCHLORIDE 5 ML: 10 INJECTION, SOLUTION EPIDURAL; INFILTRATION; INTRACAUDAL; PERINEURAL at 23:37

## 2022-07-12 RX ADMIN — LIDOCAINE HYDROCHLORIDE,EPINEPHRINE BITARTRATE 3 ML: 15; .005 INJECTION, SOLUTION EPIDURAL; INFILTRATION; INTRACAUDAL; PERINEURAL at 18:20

## 2022-07-12 RX ADMIN — SODIUM CHLORIDE, POTASSIUM CHLORIDE, SODIUM LACTATE AND CALCIUM CHLORIDE 1000 ML: 600; 310; 30; 20 INJECTION, SOLUTION INTRAVENOUS at 18:15

## 2022-07-12 RX ADMIN — Medication 340 ML/HR: at 23:29

## 2022-07-12 ASSESSMENT — ACTIVITIES OF DAILY LIVING (ADL)
DIFFICULTY_COMMUNICATING: NO
DRESSING/BATHING_DIFFICULTY: NO
ADLS_ACUITY_SCORE: 18
ADLS_ACUITY_SCORE: 35
FALL_HISTORY_WITHIN_LAST_SIX_MONTHS: NO
DOING_ERRANDS_INDEPENDENTLY_DIFFICULTY: NO
WEAR_GLASSES_OR_BLIND: NO
TOILETING_ISSUES: NO
CONCENTRATING,_REMEMBERING_OR_MAKING_DECISIONS_DIFFICULTY: NO
ADLS_ACUITY_SCORE: 18
ADLS_ACUITY_SCORE: 18
DIFFICULTY_EATING/SWALLOWING: NO
WALKING_OR_CLIMBING_STAIRS_DIFFICULTY: NO
HEARING_DIFFICULTY_OR_DEAF: NO
ADLS_ACUITY_SCORE: 18
CHANGE_IN_FUNCTIONAL_STATUS_SINCE_ONSET_OF_CURRENT_ILLNESS/INJURY: NO

## 2022-07-12 NOTE — PROGRESS NOTES
"Labor Progress Note:    Patient Name:  Margarita Roper  :      1990  MRN:      [id number]        Subjective:  Margarita is coping well with contractions with epidural in place. Reports she feels some \"heaviness\" and a decrease in intensity of contractions since epidural placement at approximately 6:30PM. She is feeling some minimal pressure but no urge to push. Adequate PO fluid intake.  and friend supportive at bedside. Discussed rupturing bulging forebag and patient opted for AROM.       Objective:  /64   Temp (P) 98.7  F (37.1  C) (Oral)   LMP 10/09/2021   SpO2 98%     FHR: 150, moderate variability, accels present, no decels.   Contractions: q 2-4 minutes, moderate to firm to palpation.  Cervix: 7, 70%, -1, soft, posterior; bulging BOW noted, AROM with clear fluid  Other: pitocin at 4 mU/min      Assessment:    - Active labor  - Category 1 FHTs  - AROM of forebag with clear fluid  - GBS Negative  - Rh Negative  - History of depression  - Epidural anesthesia      Plan:   - After discussion, patient opted for amniotomy of bulging forebag  - Continue pitocin titration per protocol  - Continuous fetal monitoring  - Routine CNM support & management. Encourage position changes, ambulation, rest as desired.  - Anticipate progress and NSVB.   - Plan Rhogam PP and cord blood collection for blood type O Negative  - Reevaluate progress in 1-2 hours or sooner with a change in status.   - Plan PPTL while inpatient    Total time spent with patient: 40 minutes, of which >50% time spent counseling and coordination of care.    Patient was seen with Stacey Schwartz, Student Nurse Midwife who was present for learning. I personally assessed, examined and made clinical decisions reflected in the documentation.     Provider:  GAURAV Mcmanus, [facility]        Date:  2022  Time:  6:51 PM      "

## 2022-07-12 NOTE — CONSULTS
"ACUPUNCTURIST TREATMENT NOTE    Name: Margarita Roper  :  1990  MRN:  [id number]    Acupuncture Treatment  Patient Type: Maternity  Intervention Reason: Desires Experience  Patient complaint:: Induction  Initial insertions: Li 4, Sp 6, Bl 31, 32     \"Risks and benefits of acupuncture were discussed with patient. Consent for treatment was given. We thank you for the referral.\"     David Vaca    Date:  2022  Time:  3:22 PM    "

## 2022-07-12 NOTE — TELEPHONE ENCOUNTER
PHONE CALL TO ON-CALL CNM:    Margarita calls reporting she thinks she water broke.    Stood up from using the bathroom and has noticed continued leaking.  Shorts wet, and when walks feels like has to keep wiping or else fluid will run down legs.    Consistency of urine, but doesn't think leaking urine and bladder is empty.  Also having some white clear mucous like discharge.    Wants to come in and be evaluated.  Will come to [facility] now to confirm rupture of membrane status.

## 2022-07-12 NOTE — PROGRESS NOTES
Labor Progress Note:    Patient Name:  Margarita Roper  :      1990  MRN:      [id number]        Shortly after increasing pitocin dose to 6 mu/min, labor kicked in.  Xochitl is very uncomfortable now, and asking for an epidural.  IV fluid bolus started, anesthesia to be called soon.  Room being readied for delivery.    Report to Divya NOLASCO CNM        Total time spent with patient: 25 minutes, >50% time spent counseling and coordination of care.          Provider:  GAURAV Lynne/HAWA          Date:  2022  Time:  5:58 PM

## 2022-07-12 NOTE — PROGRESS NOTES
Labor Progress Note:    Patient Name:  Margarita Roper  :      1990  MRN:      [id number]        Subjective:  Margarita is relatively comfortable still - finds some contractions mild and others needs to breathe through.  Walked and did various position changes in the room.  Had acupuncture treatment to help augment labor.  Now ready to see if can take a short nap.    Vantha and friend Aracelis supportive.       Objective:  /72   Temp 98.5  F (36.9  C) (Oral)   LMP 10/09/2021     FHR: 145, mod variability, accels present, a few intermittent variable decels  lasting up to 1 minute.  Contractions: q 2-5 minutes, mild-mod to palpation.  Cervix: exam deferred at this time.  Other: Pitocin at 4mu/min      Assessment:    - SROM at 0600 today, now 11+ hours post ROM  - Subsequent IOL as didn't go into spontaneous labor  - History of cervix at Ramsey 5, with cervical ripening dosing for pitocin started 3 hours ago.  Patient interested in moving IOL process forward   - Category II FHTs      Plan:   - Discussed options for continued cervical ripening dosing, vs having RN increase pitocin dosing per order set to achieve active labor.  Patient elects to move IOL process forward, and have RN increase pitocin per order set guidelines.  If active labor isn't achieved by bedtime, could consider stopping it and starting a further cervical ripening agent like cytotec.  All questions answered.  Patient agrees with plan of care.   -Routine CNM support & management. Encourage position changes, ambulation, rest as desired.  -Anticipate progress  -Divya NOLASCO CNM will assume care at 1800 and report will be given to her then.         Total time spent with patient: 15 minutes, of which >50% time spent counseling and coordination of care.      Provider:  GAURAV Lynne/HAWA        Date:  2022  Time:  5:18 PM

## 2022-07-12 NOTE — PROGRESS NOTES
Pt presents to triage with c/o of leaking fluid at 0600 today after voiding. Pt reports a few contractions, palpating mild. V/s stable no bleeding. FHR category I, ROM performed.  Provider notified that pt is present.  ROM plus POS. Midwife discussed options with pt and options to go home.  Pt opts to go home and return in a few hours when necessary. Bedside US performed by HAWA to confirm head placement. DAMARIS MIMS RN

## 2022-07-12 NOTE — PROGRESS NOTES
"Outpatient/Triage and Discharge to Home Note:    Patient Name:  Margarita Roper  :      1990  MRN:      [id number]      Subjective:  Margarita is a 32 year old  who presented to [facility] for evaluation of fluid leaking from vagina.      Margarita reports she first noticed fluid leaking at ~0600 as she was getting up from the toilet following emptying her bladder.  The PJ shorts she was wearing became wet, and then after she could feel on/off tricking or leaking.  No large gush of fluid.  Fluid clear and mixed with clear-white mucous.    Baby active.  No bleeding.  Intermittent contractions, that are uncomfortable.      Objective:  /74 (BP Location: Right arm, Patient Position: Semi-Lin's, Cuff Size: Adult Regular)   Pulse 74   Temp 98.6  F (37  C) (Oral)   Resp 16   Ht 1.753 m (5' 9\")   Wt 90.7 kg (200 lb)   LMP 10/09/2021   BMI 29.53 kg/m      Abdomen: SIUP, cephalic by Leopold's, abdomen non-tender  FHR: baseline 145, mod variability, accels present, 1 variable decel at 0759, duong 105 lasting 50-60 seconds.  No other decels.  Uterine contractions: intermittent mild-moderate contractions.  Cervical exam: exam deferred at this time  Other physical exam: bedside US confirms cephalic.      Results:  ROM+ is positive      Assessment:  -  @ 39w6d here for evaluation of fluid leaking.   - Confirmed SROM with positive ROM+ test - clear fluid  - Category I FHR generally, with 1 decel at 0759, not repeating.  Monitored for greater than 1.5 hours.  - Patient interested in discharging to home for a few hours to await spontaneous labor  - RH negative   - GBS negative      Plan:   - Talked about options for term pregnancies with ROM and no active labor.  Discussed IOL now vs expectant management up to 24 hours.  Talked about admission now, vs going home for a while.  After conversation with CNM, SNM, and RNs the couple asks for time to talk in their own, and following conversation have elected " to discharge to home now for about 3 hours.  Then, if labor hasn't started on it's own, would like to return and consider IOL to move the process forward during daytime hours.  Knows that she ultimately would like an epidural, so feels comfortable with the idea of other intervention.  Therefore, plan established to discharge to home undelivered, with return around noon for re-evaluation and then intervention PRN.   - Reviewed warning signs, including education about decreased or abnormal fetal movement patterns, change in color of amnionitic fluid, vaginal bleeding, signs of advancing labor, and other general warning signs.  Will call/return prior to noon with any concerns or any of the above.   - Reviewed how to contact on-call CNM.   - All questions answered. Agrees with plan.         TT = 35 minutes of time of which >50% on education/counseling/care-coordination            Provider:  GAURAV Lynne/HAWA

## 2022-07-12 NOTE — H&P
"Date: 2022  Time: 1:28 PM    Admission H&P  Margarita Roper,  1990, MRN [id number]    [facility]  Amniotic fluid leaking [O42.90]    PCP: Pediatrics, [facility], [phone]          Extended Emergency Contact Information  Primary Emergency Contact: MELANIE HARGROVE   [facility]  Mobile Phone: [phone]  Relation: Spouse  Secondary Emergency Contact: HEATH ROPER   [facility]  Home Phone: [phone]  Relation: Mother         Chief Complaint: Amniotic fluid leaking          HPI:      Margarita, is a 32 year old,  at 39w6d, LMP 10/09/21, Estimated Date of Delivery: 2022, confirmed by ultrasound at 8 weeks, admitted to [facility] on 2022 at 1326 secondary to: IOL for PROM at approximately 6am this morning. She was seen in triage earlier this morning with a confirmation of ROM. She decided to go home for a few hours to wait for spontaneous labor and return this afternoon for IOL if labor had not begun spontaneously. She reports inconsistent contractions varying in intensity. Some she has to breathe through while others she can talk through. \"I do not feel like I am in labor.\"  She reports good fetal movement and the loss of her mucus plug. Fluid leaking has been inconsistent and minimal since this morning's evaluation. She denies bloody show, bleeding, headaches, abdominal pain, or any other associated symptoms. \"I am ready to move this along.\" She is accompanied by partner, Yasmany, and friend, Aracelis.     Prenatal History:  Margarita began care with [facility] Certified Nurse-Midwives at the  [facility] on 22 at 14 weeks gestation with regular care thereafter for a total of 9 visits. Her care was complicated by RH negative status.      Pre-pregnant weight:  79.8kg (176 lb)  Pre-pregnant BMI: 25.98    Total weight gain:  24 lbs    Labs:    Hemoglobin on admission: 12.0    Admission on 2022   Component Date Value Ref " Range Status     SARS CoV2 PCR 07/12/2022 Negative  Negative Final    NEGATIVE: SARS-CoV-2 (COVID-19) RNA not detected, presumed negative.   Admission on 07/12/2022, Discharged on 07/12/2022   Component Date Value Ref Range Status     Rupture of Fetal Membranes by ROM * 07/12/2022 Positive (A) Negative, Invalid, Suggest Repeat Final   Hospital Outpatient Visit on 06/28/2022   Component Date Value Ref Range Status     LVEF  06/28/2022 55-60%   Final   Prenatal Office Visit on 06/22/2022   Component Date Value Ref Range Status     Hemoglobin 06/22/2022 12.7  11.7 - 15.7 g/dL Final     Hold Specimen 06/22/2022 JIC   Final   Prenatal Office Visit on 06/13/2022   Component Date Value Ref Range Status     Trichomonas 06/13/2022 Absent  Absent Final     Yeast 06/13/2022 Absent  Absent Final     Clue Cells 06/13/2022 Present (A) Absent Final     WBCs/high power field 06/13/2022 1+ (A) None Final     Group B Strep PCR 06/13/2022 Negative  Negative Final    Presumed negative for Streptococcus agalactiae (Group B Streptococcus) or the number of organisms may be below the limit of detection of the assay.     Color Urine 06/13/2022 Yellow  Colorless, Straw, Light Yellow, Yellow Final     Appearance Urine 06/13/2022 Clear  Clear Final     Glucose Urine 06/13/2022 Negative  Negative mg/dL Final     Bilirubin Urine 06/13/2022 Negative  Negative Final     Ketones Urine 06/13/2022 Negative  Negative mg/dL Final     Specific Gravity Urine 06/13/2022 <=1.005  1.005 - 1.030 Final     Blood Urine 06/13/2022 Negative  Negative Final     pH Urine 06/13/2022 6.5  5.0 - 8.0 Final     Protein Albumin Urine 06/13/2022 Negative  Negative mg/dL Final     Urobilinogen Urine 06/13/2022 0.2  0.2, 1.0 E.U./dL Final     Nitrite Urine 06/13/2022 Negative  Negative Final     Leukocyte Esterase Urine 06/13/2022 Negative  Negative Final   Prenatal Office Visit on 04/21/2022   Component Date Value Ref Range Status     Hemoglobin 04/21/2022 11.6 (A) 11.7 -  15.7 g/dL Final     Treponema Antibody Total 04/21/2022 Nonreactive  Nonreactive Final     Glu Gest Screen 1hr 50g 04/21/2022 110  70 - 129 mg/dL Final     Antibody Screen 04/21/2022 Negative  Negative Final     SPECIMEN EXPIRATION DATE 04/21/2022 [id number]   Final   Prenatal Office Visit on 03/25/2022   Component Date Value Ref Range Status     Color Urine 03/25/2022 Yellow  Colorless, Straw, Light Yellow, Yellow Final     Appearance Urine 03/25/2022 Clear  Clear Final     Glucose Urine 03/25/2022 Negative  Negative mg/dL Final     Bilirubin Urine 03/25/2022 Negative  Negative Final     Ketones Urine 03/25/2022 Negative  Negative mg/dL Final     Specific Gravity Urine 03/25/2022 1.015  1.005 - 1.030 Final     Blood Urine 03/25/2022 Negative  Negative Final     pH Urine 03/25/2022 7.0  5.0 - 8.0 Final     Protein Albumin Urine 03/25/2022 Negative  Negative mg/dL Final     Urobilinogen Urine 03/25/2022 0.2  0.2, 1.0 E.U./dL Final     Nitrite Urine 03/25/2022 Negative  Negative Final     Leukocyte Esterase Urine 03/25/2022 Negative  Negative Final     WBC Count 03/25/2022 7.3  4.0 - 11.0 10e3/uL Final     RBC Count 03/25/2022 3.59 (A) 3.80 - 5.20 10e6/uL Final     Hemoglobin 03/25/2022 11.6 (A) 11.7 - 15.7 g/dL Final     Hematocrit 03/25/2022 33.3 (A) 35.0 - 47.0 % Final     MCV 03/25/2022 93  78 - 100 fL Final     MCH 03/25/2022 32.3  26.5 - 33.0 pg Final     MCHC 03/25/2022 34.8  31.5 - 36.5 g/dL Final     RDW 03/25/2022 12.9  10.0 - 15.0 % Final     Platelet Count 03/25/2022 260  150 - 450 10e3/uL Final     Sodium 03/25/2022 139  136 - 145 mmol/L Final     Potassium 03/25/2022 3.7  3.5 - 5.0 mmol/L Final     Chloride 03/25/2022 107  98 - 107 mmol/L Final     Carbon Dioxide (CO2) 03/25/2022 22  22 - 31 mmol/L Final     Anion Gap 03/25/2022 10  5 - 18 mmol/L Final     Urea Nitrogen 03/25/2022 5 (A) 8 - 22 mg/dL Final     Creatinine 03/25/2022 0.62  0.60 - 1.10 mg/dL Final     Calcium 03/25/2022 8.8  8.5 - 10.5  mg/dL Final     Glucose 03/25/2022 96  70 - 125 mg/dL Final     Alkaline Phosphatase 03/25/2022 114  45 - 120 U/L Final     AST 03/25/2022 13  0 - 40 U/L Final     ALT 03/25/2022 13  0 - 45 U/L Final     Protein Total 03/25/2022 5.8 (A) 6.0 - 8.0 g/dL Final     Albumin 03/25/2022 2.9 (A) 3.5 - 5.0 g/dL Final     Bilirubin Total 03/25/2022 0.2  0.0 - 1.0 mg/dL Final     GFR Estimate 03/25/2022 >90  >60 mL/min/1.73m2 Final    Effective December 21, 2021 eGFRcr in adults is calculated using the 2021 CKD-EPI creatinine equation which includes age and gender (Maddie et al., NE, DOI: 10.1056/PJHSlj6782107)   Prenatal Office Visit on 01/14/2022   Component Date Value Ref Range Status     Culture 01/14/2022 No Growth   Final     ABO/RH(D) 01/14/2022 O NEG   Final     SPECIMEN EXPIRATION DATE 01/14/2022 [id number]   Final     Antibody Screen 01/14/2022 Negative  Negative Final     SPECIMEN EXPIRATION DATE 01/14/2022 [id number]   Final     WBC Count 01/14/2022 8.3  4.0 - 11.0 10e3/uL Final     RBC Count 01/14/2022 3.84  3.80 - 5.20 10e6/uL Final     Hemoglobin 01/14/2022 12.1  11.7 - 15.7 g/dL Final     Hematocrit 01/14/2022 34.3 (A) 35.0 - 47.0 % Final     MCV 01/14/2022 89  78 - 100 fL Final     MCH 01/14/2022 31.5  26.5 - 33.0 pg Final     MCHC 01/14/2022 35.3  31.5 - 36.5 g/dL Final     RDW 01/14/2022 12.3  10.0 - 15.0 % Final     Platelet Count 01/14/2022 258  150 - 450 10e3/uL Final     Rubella Kelsie IgG Instrument Value 01/14/2022 5.36 (A) <0.90 Index Final     Rubella Antibody IgG 01/14/2022 Positive (A) Negative Final    Suggests previous exposure or immunization and probable immunity.     Treponema Antibody Total 01/14/2022 Nonreactive  Nonreactive Final     Hepatitis B Surface Antigen 01/14/2022 Nonreactive  Nonreactive Final     Hepatitis C Antibody 01/14/2022 Negative  Negative Final     HIV Antigen Antibody Combo 01/14/2022 Negative  Negative Final     See Scanned Result 01/14/2022 INVITAE NON-INVASIVE  PRENATAL SCREENING-Scanned   Final     Hemoglobin A1C 2022 5.0  0.0 - 5.6 % Final    Normal <5.7%   Prediabetes 5.7-6.4%    Diabetes 6.5% or higher     Note: Adopted from ADA consensus guidelines.     Free T4 2022 0.86  0.70 - 1.80 ng/dL Final    Performance of the Free T4 test has not been established with  specimens (<= 2 months of age).       TSH 2022 1.42  0.30 - 5.00 uIU/mL Final   Office Visit on 2021   Component Date Value Ref Range Status     hCG Quantitative 2021 72,079 (A) 0 - 4 mlU/mL Final    Non-pregnant female . . . . . . . . . . . . . 0-4 (<5) mIU/mL  Equivocal result for early pregnancy . . . . 5-24 mIU/mL  Values in pregnancy should double every 2-3 days for the first 6 weeks. Patients with very low levels of hCG should be resampled and retested after 48 hours.   For diagnostic purposes, hCG results should be used   conjunction with other data.   If the hCG level is inconsistent with clinical evidence,   results should be confirmed by an alternate hCG method.   This assay is approved for use in the early detection   of pregnancy only. It is not approved for any other   uses such as tumor marker screening or monitoring.     ABO/RH(D) 2021 O NEG   Final     Antibody Screen 2021 Negative  Negative Final     SPECIMEN EXPIRATION DATE 2021 [id number]   Final           Pertinent Radiology:           Working STEVEN: 22 set by Ning Salas CNM on 22 based on Ultrasound on 21     Based On STEVEN GA Diff GA User Date   Last Menstrual Period on 10/09/21 07/16/22 -3d  System action - copied 22   Ultrasound on 21 Working 8w1d Ning Salas CNM 22   Ultrasound on 22  0 Comment: no anomalies, normal fetal survey, anterior placenta. EFW 54% 22 +4d 20w6d Sharon Rey CNM 22   Alternate STEVEN Entry  0 Comment: Date entered prior to episode creation 22 Same  Ning Salas CNM  22           OB HISTORY  OB History    Para Term  AB Living   2 1 1 0 0 1   SAB IAB Ectopic Multiple Live Births   0 0 0 0 1      # Outcome Date GA Lbr Nasim/2nd Weight Sex Delivery Anes PTL Lv   2 Current            1 Term 16 40w1d  3.572 kg (7 lb 14 oz) M Vag-Spont EPI  [facility]      Birth Comments: Long prodromal labor, spontaneous active labor, pushed x 1 hr, tear with repair, BFx 18 months      Name: Thomas      Apgar1: 9  Apgar5: 9           Medical History  Past Medical History:   Diagnosis Date     Abnormal Pap smear of cervix      Allergic rhinitis      Cervical high risk HPV (human papillomavirus) test positive 2010     Depression      Depressive disorder      History of kidney stones      HPV (human papilloma virus) infection      Intermittent asthma      PVC's (premature ventricular contractions)       Surgical History  She  has a past surgical history that includes Extraction(s) dental; colposcopy; [place] Tooth Extraction; and orthopedic surgery.       Social History  Reviewed, and she  reports that she has quit smoking. Her smoking use included cigarettes. She has a 1.80 pack-year smoking history. She has never used smokeless tobacco. She reports previous alcohol use. She reports that she does not use drugs.  Partner: Yasmany  Education level: college graduate        Family History  Reviewed, and family history includes C.A.D. in her paternal grandfather; Cancer (age of onset: 63) in her mother; Cerebrovascular Disease in her paternal grandmother; Heart Disease in her brother and paternal grandfather; Hypertension in her father; Kidney Disease in her maternal grandfather; No Known Problems in her maternal aunt, maternal grandmother, maternal uncle, paternal aunt, paternal uncle, and son; Unknown/Adopted in her maternal grandfather.          Allergies   Allergen Reactions     Penicillins Rash and Hives     Ampicillin Sodium      Other [No Clinical Screening - See Comments]       Allergic to corn     Lactalbumin Other (See Comments)     Bloated, constipated      Medications Prior to Admission   Medication Sig Dispense Refill Last Dose     Fenugreek 500 MG CAPS         Prenatal Vit-Fe Fumarate-FA (PRENATAL MULTIVITAMIN W/IRON) 27-0.8 MG tablet Take 1 tablet by mouth daily        vitamin B complex with vitamin C (STRESS TAB) tablet Take 1 tablet by mouth daily         VITAMIN D, CHOLECALCIFEROL, PO Take by mouth daily         zinc gluconate 50 MG tablet Take 50 mg by mouth daily           Review of Systems:  A comprehensive review of systems was negative.   Physical Exam:  Temp:  [98.6  F (37  C)] 98.6  F (37  C)  Pulse:  [74] 74  Resp:  [16] 16  BP: (127)/(74) 127/74      Heart:  RRR, negative murmur  Lungs:  Clear to ascultation bilaterally, non-labored breathing pattern  Abdomen:  Soft, non-tender, gravid with S = D, vertex position per leopolds.  Legs:  no edema, no varicosities.  Other:  Vertex position verified on prior ultrasound this AM      Membrane Status:  ROM+ positive during triage visit this morning     Cervical Exam:  3 cm, 50 % effaced, -2 station, medium consistency, very posterior, cephalic.  Ramsey: 5   Fetal Heart Rate:   baseline 150, moderate variability, accels present, no decels.   Uterine Activity:   contractions intermittent, mild-to-moderate to palpation.                Notable AP/IP factors to date:  1)  GBS Negative  2)  Rh Negative  3)  History of mild intermittent, exercise-induced asthma  4)  History of PVCs (cleared in prior pregnancy by cardiology and since mostly resolved)  5) SROM this morning  6) History of depression  7) Patient planning a postpartum tubal ligation while inpatient postpartum.  To notify [facility] about plan post birth.        Impression:  -Margarita is a 32 year old year old at 39w6d weeks gestation, with PROM since 0600 on 7/12/22, currently 7.5 hours post ROM.  -Category 1 FHTs   -GBS Negative  -Blood type: O Negative   -Plans PPTL while  inpatient postpartum     Plan:  1)  [facility]  2)  Discussed options for ROM without active onset of labor/Ramsey score 5, including cervical ripening by Cytotec or pitocin (with plan to transition/move from pitocin cervical ripening to IOL dosing as contractions seem to progress).  Patient opts for low-dose pitocin for cervical ripening.   3)  Encourage position changes.  Labor support as needed.    4)  Continous fetal monitoring.  5)  Plan Rhogam PP and cord blood collection for blood type O Negative  6) Plan PPTL while inpatient  7)  Anticipate Spontaneous vaginal birth        Total time with patient:  30 minutes at bedside and in the [facility] obtaining and clarifying the above history, performing the physical exam, education and counseling and developing this plan of care.  >50% spent on counseling and coordination of care.    Patient was seen with Stacey Schwartz, Student Nurse Midwife who was present for learning. I personally assessed, examined and made clinical decisions reflected in the documentation.     Provider: GAURAV Perez, [facility]

## 2022-07-12 NOTE — ANESTHESIA PREPROCEDURE EVALUATION
Anesthesia Pre-Procedure Evaluation    Patient: Margarita Roper   MRN: [id number] : 1990        Procedure : * No procedures listed *          Past Medical History:   Diagnosis Date     Abnormal Pap smear of cervix      Allergic rhinitis      Cervical high risk HPV (human papillomavirus) test positive 2010     Depression      Depressive disorder      History of kidney stones      HPV (human papilloma virus) infection      Intermittent asthma      PVC's (premature ventricular contractions)       Past Surgical History:   Procedure Laterality Date     COLPOSCOPY      early 20's after an abnormal pap smear     EXTRACTION(S) DENTAL       ORTHOPEDIC SURGERY       WISDOM TOOTH EXTRACTION        Allergies   Allergen Reactions     Penicillins Rash and Hives     Ampicillin Sodium      Other [No Clinical Screening - See Comments]      Allergic to corn     Lactalbumin Other (See Comments)     Bloated, constipated      Social History     Tobacco Use     Smoking status: Former Smoker     Packs/day: 0.30     Years: 6.00     Pack years: 1.80     Types: Cigarettes     Smokeless tobacco: Never Used     Tobacco comment: 1-2 cigarettes per week   Substance Use Topics     Alcohol use: Not Currently     Comment: social      Wt Readings from Last 1 Encounters:   22 90.7 kg (200 lb)        Anesthesia Evaluation   Pt has had prior anesthetic.         ROS/MED HX  ENT/Pulmonary:     (+) asthma     Neurologic:  - neg neurologic ROS     Cardiovascular:  - neg cardiovascular ROS     METS/Exercise Tolerance:     Hematologic:  - neg hematologic  ROS     Musculoskeletal:  - neg musculoskeletal ROS     GI/Hepatic:  - neg GI/hepatic ROS     Renal/Genitourinary:  - neg Renal ROS     Endo:  - neg endo ROS     Psychiatric/Substance Use:       Infectious Disease:  - neg infectious disease ROS     Malignancy:  - neg malignancy ROS     Other:      (+) Possibly pregnant, ,         Physical Exam    Airway        Mallampati: I   TM  distance: > 3 FB   Neck ROM: full   Mouth opening: > 3 cm    Respiratory Devices and Support         Dental  no notable dental history         Cardiovascular             Pulmonary                   OUTSIDE LABS:  CBC:   Lab Results   Component Value Date    WBC 8.2 07/12/2022    WBC 7.3 03/25/2022    HGB 12.0 07/12/2022    HGB 12.7 06/22/2022    HCT 34.6 (L) 07/12/2022    HCT 33.3 (L) 03/25/2022     07/12/2022     03/25/2022     BMP:   Lab Results   Component Value Date     03/25/2022    POTASSIUM 3.7 03/25/2022    CHLORIDE 107 03/25/2022    CO2 22 03/25/2022    BUN 5 (L) 03/25/2022    CR 0.62 03/25/2022    GLC 96 03/25/2022     COAGS: No results found for: PTT, INR, FIBR  POC:   Lab Results   Component Value Date    HCG Negative 03/11/2019     HEPATIC:   Lab Results   Component Value Date    ALBUMIN 2.9 (L) 03/25/2022    PROTTOTAL 5.8 (L) 03/25/2022    ALT 13 03/25/2022    AST 13 03/25/2022    ALKPHOS 114 03/25/2022    BILITOTAL 0.2 03/25/2022     OTHER:   Lab Results   Component Value Date    A1C 5.0 01/14/2022    JT 8.8 03/25/2022    TSH 1.42 01/14/2022    T4 0.86 01/14/2022       Anesthesia Plan    ASA Status:  2      Anesthesia Type: Epidural.              Consents    Anesthesia Plan(s) and associated risks, benefits, and realistic alternatives discussed. Questions answered and patient/representative(s) expressed understanding.    - Discussed:     - Discussed with:  Patient         Postoperative Care            Comments:                Nic Barrios MD

## 2022-07-13 ENCOUNTER — ANESTHESIA (OUTPATIENT)
Dept: SURGERY | Facility: CLINIC | Age: 32
End: 2022-07-13
Payer: COMMERCIAL

## 2022-07-13 ENCOUNTER — ANESTHESIA EVENT (OUTPATIENT)
Dept: SURGERY | Facility: CLINIC | Age: 32
End: 2022-07-13
Payer: COMMERCIAL

## 2022-07-13 ENCOUNTER — APPOINTMENT (OUTPATIENT)
Dept: PHYSICAL THERAPY | Facility: CLINIC | Age: 32
End: 2022-07-13
Attending: ADVANCED PRACTICE MIDWIFE
Payer: COMMERCIAL

## 2022-07-13 PROBLEM — O42.90 AMNIOTIC FLUID LEAKING: Status: RESOLVED | Noted: 2022-07-12 | Resolved: 2022-07-13

## 2022-07-13 PROBLEM — R10.2 PAIN OF PELVIC GIRDLE: Status: RESOLVED | Noted: 2022-04-13 | Resolved: 2022-07-13

## 2022-07-13 LAB
ABO/RH(D): NORMAL
FBST KIT EXP: NORMAL
FBST LOT #: NORMAL
FETAL BLEED SCREEN: NORMAL
SPECIMEN EXPIRATION DATE: NORMAL
T PALLIDUM AB SER QL: NONREACTIVE

## 2022-07-13 PROCEDURE — 250N000013 HC RX MED GY IP 250 OP 250 PS 637: Performed by: ADVANCED PRACTICE MIDWIFE

## 2022-07-13 PROCEDURE — 370N000017 HC ANESTHESIA TECHNICAL FEE, PER MIN: Performed by: OBSTETRICS & GYNECOLOGY

## 2022-07-13 PROCEDURE — 59400 OBSTETRICAL CARE: CPT | Performed by: ADVANCED PRACTICE MIDWIFE

## 2022-07-13 PROCEDURE — 97161 PT EVAL LOW COMPLEX 20 MIN: CPT | Mod: GP

## 2022-07-13 PROCEDURE — 999N000141 HC STATISTIC PRE-PROCEDURE NURSING ASSESSMENT: Performed by: OBSTETRICS & GYNECOLOGY

## 2022-07-13 PROCEDURE — 97110 THERAPEUTIC EXERCISES: CPT | Mod: GP

## 2022-07-13 PROCEDURE — 272N000001 HC OR GENERAL SUPPLY STERILE: Performed by: OBSTETRICS & GYNECOLOGY

## 2022-07-13 PROCEDURE — 0KQM0ZZ REPAIR PERINEUM MUSCLE, OPEN APPROACH: ICD-10-PCS | Performed by: ADVANCED PRACTICE MIDWIFE

## 2022-07-13 PROCEDURE — 258N000003 HC RX IP 258 OP 636: Performed by: NURSE ANESTHETIST, CERTIFIED REGISTERED

## 2022-07-13 PROCEDURE — 250N000011 HC RX IP 250 OP 636: Performed by: ADVANCED PRACTICE MIDWIFE

## 2022-07-13 PROCEDURE — 250N000011 HC RX IP 250 OP 636: Performed by: OBSTETRICS & GYNECOLOGY

## 2022-07-13 PROCEDURE — 88302 TISSUE EXAM BY PATHOLOGIST: CPT | Mod: TC | Performed by: OBSTETRICS & GYNECOLOGY

## 2022-07-13 PROCEDURE — 360N000075 HC SURGERY LEVEL 2, PER MIN: Performed by: OBSTETRICS & GYNECOLOGY

## 2022-07-13 PROCEDURE — 250N000009 HC RX 250: Performed by: NURSE ANESTHETIST, CERTIFIED REGISTERED

## 2022-07-13 PROCEDURE — 97116 GAIT TRAINING THERAPY: CPT | Mod: GP

## 2022-07-13 PROCEDURE — 86901 BLOOD TYPING SEROLOGIC RH(D): CPT | Performed by: ADVANCED PRACTICE MIDWIFE

## 2022-07-13 PROCEDURE — 722N000001 HC LABOR CARE VAGINAL DELIVERY SINGLE

## 2022-07-13 PROCEDURE — 710N000010 HC RECOVERY PHASE 1, LEVEL 2, PER MIN: Performed by: OBSTETRICS & GYNECOLOGY

## 2022-07-13 PROCEDURE — 120N000001 HC R&B MED SURG/OB

## 2022-07-13 PROCEDURE — 250N000011 HC RX IP 250 OP 636: Performed by: NURSE ANESTHETIST, CERTIFIED REGISTERED

## 2022-07-13 PROCEDURE — 36415 COLL VENOUS BLD VENIPUNCTURE: CPT | Performed by: ADVANCED PRACTICE MIDWIFE

## 2022-07-13 PROCEDURE — 250N000011 HC RX IP 250 OP 636: Performed by: ANESTHESIOLOGY

## 2022-07-13 PROCEDURE — 250N000013 HC RX MED GY IP 250 OP 250 PS 637: Performed by: OBSTETRICS & GYNECOLOGY

## 2022-07-13 PROCEDURE — 0UB70ZZ EXCISION OF BILATERAL FALLOPIAN TUBES, OPEN APPROACH: ICD-10-PCS | Performed by: OBSTETRICS & GYNECOLOGY

## 2022-07-13 PROCEDURE — 88302 TISSUE EXAM BY PATHOLOGIST: CPT | Mod: 26 | Performed by: PATHOLOGY

## 2022-07-13 RX ORDER — ONDANSETRON 4 MG/1
4 TABLET, ORALLY DISINTEGRATING ORAL EVERY 30 MIN PRN
Status: DISCONTINUED | OUTPATIENT
Start: 2022-07-13 | End: 2022-07-13 | Stop reason: HOSPADM

## 2022-07-13 RX ORDER — PROPOFOL 10 MG/ML
INJECTION, EMULSION INTRAVENOUS CONTINUOUS PRN
Status: DISCONTINUED | OUTPATIENT
Start: 2022-07-13 | End: 2022-07-13

## 2022-07-13 RX ORDER — NALOXONE HYDROCHLORIDE 0.4 MG/ML
0.2 INJECTION, SOLUTION INTRAMUSCULAR; INTRAVENOUS; SUBCUTANEOUS
Status: DISCONTINUED | OUTPATIENT
Start: 2022-07-13 | End: 2022-07-14 | Stop reason: HOSPADM

## 2022-07-13 RX ORDER — SODIUM CHLORIDE, SODIUM LACTATE, POTASSIUM CHLORIDE, CALCIUM CHLORIDE 600; 310; 30; 20 MG/100ML; MG/100ML; MG/100ML; MG/100ML
INJECTION, SOLUTION INTRAVENOUS CONTINUOUS
Status: DISCONTINUED | OUTPATIENT
Start: 2022-07-13 | End: 2022-07-13 | Stop reason: HOSPADM

## 2022-07-13 RX ORDER — ACETAMINOPHEN 325 MG/1
975 TABLET ORAL EVERY 6 HOURS
Status: DISCONTINUED | OUTPATIENT
Start: 2022-07-13 | End: 2022-07-14 | Stop reason: HOSPADM

## 2022-07-13 RX ORDER — ONDANSETRON 2 MG/ML
INJECTION INTRAMUSCULAR; INTRAVENOUS PRN
Status: DISCONTINUED | OUTPATIENT
Start: 2022-07-13 | End: 2022-07-13

## 2022-07-13 RX ORDER — OXYCODONE HYDROCHLORIDE 5 MG/1
10 TABLET ORAL EVERY 4 HOURS PRN
Status: DISCONTINUED | OUTPATIENT
Start: 2022-07-13 | End: 2022-07-14 | Stop reason: HOSPADM

## 2022-07-13 RX ORDER — HYDROCORTISONE 25 MG/G
CREAM TOPICAL 3 TIMES DAILY PRN
Status: DISCONTINUED | OUTPATIENT
Start: 2022-07-13 | End: 2022-07-14 | Stop reason: HOSPADM

## 2022-07-13 RX ORDER — ACETAMINOPHEN 325 MG/1
650 TABLET ORAL EVERY 4 HOURS PRN
Status: DISCONTINUED | OUTPATIENT
Start: 2022-07-13 | End: 2022-07-14 | Stop reason: HOSPADM

## 2022-07-13 RX ORDER — CARBOPROST TROMETHAMINE 250 UG/ML
250 INJECTION, SOLUTION INTRAMUSCULAR
Status: DISCONTINUED | OUTPATIENT
Start: 2022-07-13 | End: 2022-07-14 | Stop reason: HOSPADM

## 2022-07-13 RX ORDER — OXYCODONE HYDROCHLORIDE 5 MG/1
5 TABLET ORAL EVERY 4 HOURS PRN
Status: DISCONTINUED | OUTPATIENT
Start: 2022-07-13 | End: 2022-07-14 | Stop reason: HOSPADM

## 2022-07-13 RX ORDER — GLYCOPYRROLATE 0.2 MG/ML
INJECTION, SOLUTION INTRAMUSCULAR; INTRAVENOUS PRN
Status: DISCONTINUED | OUTPATIENT
Start: 2022-07-13 | End: 2022-07-13

## 2022-07-13 RX ORDER — FENTANYL CITRATE 50 UG/ML
25 INJECTION, SOLUTION INTRAMUSCULAR; INTRAVENOUS EVERY 5 MIN PRN
Status: DISCONTINUED | OUTPATIENT
Start: 2022-07-13 | End: 2022-07-13 | Stop reason: HOSPADM

## 2022-07-13 RX ORDER — BISACODYL 10 MG
10 SUPPOSITORY, RECTAL RECTAL DAILY PRN
Status: DISCONTINUED | OUTPATIENT
Start: 2022-07-13 | End: 2022-07-14 | Stop reason: HOSPADM

## 2022-07-13 RX ORDER — MISOPROSTOL 200 UG/1
400 TABLET ORAL
Status: DISCONTINUED | OUTPATIENT
Start: 2022-07-13 | End: 2022-07-14 | Stop reason: HOSPADM

## 2022-07-13 RX ORDER — ONDANSETRON 2 MG/ML
4 INJECTION INTRAMUSCULAR; INTRAVENOUS EVERY 6 HOURS PRN
Status: DISCONTINUED | OUTPATIENT
Start: 2022-07-13 | End: 2022-07-14 | Stop reason: HOSPADM

## 2022-07-13 RX ORDER — ONDANSETRON 4 MG/1
4 TABLET, ORALLY DISINTEGRATING ORAL EVERY 6 HOURS PRN
Status: DISCONTINUED | OUTPATIENT
Start: 2022-07-13 | End: 2022-07-14 | Stop reason: HOSPADM

## 2022-07-13 RX ORDER — AMOXICILLIN 250 MG
1 CAPSULE ORAL 2 TIMES DAILY
Status: DISCONTINUED | OUTPATIENT
Start: 2022-07-13 | End: 2022-07-14 | Stop reason: HOSPADM

## 2022-07-13 RX ORDER — OXYTOCIN/0.9 % SODIUM CHLORIDE 30/500 ML
340 PLASTIC BAG, INJECTION (ML) INTRAVENOUS CONTINUOUS PRN
Status: DISCONTINUED | OUTPATIENT
Start: 2022-07-13 | End: 2022-07-14 | Stop reason: HOSPADM

## 2022-07-13 RX ORDER — IBUPROFEN 800 MG/1
800 TABLET, FILM COATED ORAL EVERY 6 HOURS
Status: DISCONTINUED | OUTPATIENT
Start: 2022-07-13 | End: 2022-07-14 | Stop reason: HOSPADM

## 2022-07-13 RX ORDER — PROPOFOL 10 MG/ML
INJECTION, EMULSION INTRAVENOUS PRN
Status: DISCONTINUED | OUTPATIENT
Start: 2022-07-13 | End: 2022-07-13

## 2022-07-13 RX ORDER — MISOPROSTOL 200 UG/1
800 TABLET ORAL
Status: DISCONTINUED | OUTPATIENT
Start: 2022-07-13 | End: 2022-07-14 | Stop reason: HOSPADM

## 2022-07-13 RX ORDER — HYDROMORPHONE HCL IN WATER/PF 6 MG/30 ML
0.4 PATIENT CONTROLLED ANALGESIA SYRINGE INTRAVENOUS
Status: DISCONTINUED | OUTPATIENT
Start: 2022-07-13 | End: 2022-07-14 | Stop reason: HOSPADM

## 2022-07-13 RX ORDER — LIDOCAINE HYDROCHLORIDE 10 MG/ML
INJECTION, SOLUTION INFILTRATION; PERINEURAL PRN
Status: DISCONTINUED | OUTPATIENT
Start: 2022-07-13 | End: 2022-07-13

## 2022-07-13 RX ORDER — METHYLERGONOVINE MALEATE 0.2 MG/ML
200 INJECTION INTRAVENOUS
Status: DISCONTINUED | OUTPATIENT
Start: 2022-07-13 | End: 2022-07-14 | Stop reason: HOSPADM

## 2022-07-13 RX ORDER — KETOROLAC TROMETHAMINE 30 MG/ML
15 INJECTION, SOLUTION INTRAMUSCULAR; INTRAVENOUS EVERY 6 HOURS
Status: DISCONTINUED | OUTPATIENT
Start: 2022-07-13 | End: 2022-07-14 | Stop reason: HOSPADM

## 2022-07-13 RX ORDER — FENTANYL CITRATE 50 UG/ML
INJECTION, SOLUTION INTRAMUSCULAR; INTRAVENOUS PRN
Status: DISCONTINUED | OUTPATIENT
Start: 2022-07-13 | End: 2022-07-13

## 2022-07-13 RX ORDER — ACETAMINOPHEN 325 MG/1
650 TABLET ORAL EVERY 6 HOURS
Status: DISCONTINUED | OUTPATIENT
Start: 2022-07-16 | End: 2022-07-14 | Stop reason: HOSPADM

## 2022-07-13 RX ORDER — NALOXONE HYDROCHLORIDE 0.4 MG/ML
0.4 INJECTION, SOLUTION INTRAMUSCULAR; INTRAVENOUS; SUBCUTANEOUS
Status: DISCONTINUED | OUTPATIENT
Start: 2022-07-13 | End: 2022-07-14 | Stop reason: HOSPADM

## 2022-07-13 RX ORDER — SODIUM CHLORIDE, SODIUM LACTATE, POTASSIUM CHLORIDE, CALCIUM CHLORIDE 600; 310; 30; 20 MG/100ML; MG/100ML; MG/100ML; MG/100ML
INJECTION, SOLUTION INTRAVENOUS CONTINUOUS PRN
Status: DISCONTINUED | OUTPATIENT
Start: 2022-07-13 | End: 2022-07-13

## 2022-07-13 RX ORDER — DOCUSATE SODIUM 100 MG/1
100 CAPSULE, LIQUID FILLED ORAL DAILY
Status: DISCONTINUED | OUTPATIENT
Start: 2022-07-13 | End: 2022-07-14 | Stop reason: HOSPADM

## 2022-07-13 RX ORDER — LIDOCAINE 40 MG/G
CREAM TOPICAL
Status: DISCONTINUED | OUTPATIENT
Start: 2022-07-13 | End: 2022-07-13 | Stop reason: HOSPADM

## 2022-07-13 RX ORDER — DEXAMETHASONE SODIUM PHOSPHATE 10 MG/ML
INJECTION, SOLUTION INTRAMUSCULAR; INTRAVENOUS PRN
Status: DISCONTINUED | OUTPATIENT
Start: 2022-07-13 | End: 2022-07-13

## 2022-07-13 RX ORDER — OXYCODONE HYDROCHLORIDE 5 MG/1
5 TABLET ORAL EVERY 4 HOURS PRN
Status: DISCONTINUED | OUTPATIENT
Start: 2022-07-13 | End: 2022-07-13 | Stop reason: HOSPADM

## 2022-07-13 RX ORDER — OXYTOCIN 10 [USP'U]/ML
10 INJECTION, SOLUTION INTRAMUSCULAR; INTRAVENOUS
Status: DISCONTINUED | OUTPATIENT
Start: 2022-07-13 | End: 2022-07-14 | Stop reason: HOSPADM

## 2022-07-13 RX ORDER — PROCHLORPERAZINE MALEATE 10 MG
10 TABLET ORAL EVERY 6 HOURS PRN
Status: DISCONTINUED | OUTPATIENT
Start: 2022-07-13 | End: 2022-07-14 | Stop reason: HOSPADM

## 2022-07-13 RX ORDER — ONDANSETRON 2 MG/ML
4 INJECTION INTRAMUSCULAR; INTRAVENOUS EVERY 30 MIN PRN
Status: DISCONTINUED | OUTPATIENT
Start: 2022-07-13 | End: 2022-07-13 | Stop reason: HOSPADM

## 2022-07-13 RX ORDER — IBUPROFEN 800 MG/1
800 TABLET, FILM COATED ORAL EVERY 6 HOURS PRN
Status: DISCONTINUED | OUTPATIENT
Start: 2022-07-13 | End: 2022-07-14 | Stop reason: HOSPADM

## 2022-07-13 RX ORDER — MODIFIED LANOLIN
OINTMENT (GRAM) TOPICAL
Status: DISCONTINUED | OUTPATIENT
Start: 2022-07-13 | End: 2022-07-14 | Stop reason: HOSPADM

## 2022-07-13 RX ORDER — HYDROMORPHONE HCL IN WATER/PF 6 MG/30 ML
0.2 PATIENT CONTROLLED ANALGESIA SYRINGE INTRAVENOUS
Status: DISCONTINUED | OUTPATIENT
Start: 2022-07-13 | End: 2022-07-14 | Stop reason: HOSPADM

## 2022-07-13 RX ORDER — HYDROMORPHONE HCL IN WATER/PF 6 MG/30 ML
0.2 PATIENT CONTROLLED ANALGESIA SYRINGE INTRAVENOUS EVERY 5 MIN PRN
Status: DISCONTINUED | OUTPATIENT
Start: 2022-07-13 | End: 2022-07-13 | Stop reason: HOSPADM

## 2022-07-13 RX ORDER — LIDOCAINE 40 MG/G
CREAM TOPICAL
Status: DISCONTINUED | OUTPATIENT
Start: 2022-07-13 | End: 2022-07-14 | Stop reason: HOSPADM

## 2022-07-13 RX ADMIN — PROPOFOL 200 MCG/KG/MIN: 10 INJECTION, EMULSION INTRAVENOUS at 09:23

## 2022-07-13 RX ADMIN — KETOROLAC TROMETHAMINE 30 MG: 30 INJECTION, SOLUTION INTRAMUSCULAR; INTRAVENOUS at 00:52

## 2022-07-13 RX ADMIN — DOCUSATE SODIUM 100 MG: 100 CAPSULE, LIQUID FILLED ORAL at 11:00

## 2022-07-13 RX ADMIN — SUGAMMADEX 100 MG: 100 INJECTION, SOLUTION INTRAVENOUS at 09:38

## 2022-07-13 RX ADMIN — SENNOSIDES AND DOCUSATE SODIUM 1 TABLET: 50; 8.6 TABLET ORAL at 20:51

## 2022-07-13 RX ADMIN — ACETAMINOPHEN 975 MG: 325 TABLET ORAL at 23:15

## 2022-07-13 RX ADMIN — SUGAMMADEX 200 MG: 100 INJECTION, SOLUTION INTRAVENOUS at 09:37

## 2022-07-13 RX ADMIN — HYDROMORPHONE HYDROCHLORIDE 0.5 MG: 1 INJECTION, SOLUTION INTRAMUSCULAR; INTRAVENOUS; SUBCUTANEOUS at 09:28

## 2022-07-13 RX ADMIN — Medication: at 01:33

## 2022-07-13 RX ADMIN — FENTANYL CITRATE 25 MCG: 50 INJECTION, SOLUTION INTRAMUSCULAR; INTRAVENOUS at 10:10

## 2022-07-13 RX ADMIN — KETOROLAC TROMETHAMINE 15 MG: 30 INJECTION, SOLUTION INTRAMUSCULAR; INTRAVENOUS at 17:14

## 2022-07-13 RX ADMIN — SENNOSIDES AND DOCUSATE SODIUM 1 TABLET: 50; 8.6 TABLET ORAL at 11:00

## 2022-07-13 RX ADMIN — FENTANYL CITRATE 25 MCG: 50 INJECTION, SOLUTION INTRAMUSCULAR; INTRAVENOUS at 10:15

## 2022-07-13 RX ADMIN — FENTANYL CITRATE 100 MCG: 50 INJECTION, SOLUTION INTRAMUSCULAR; INTRAVENOUS at 09:18

## 2022-07-13 RX ADMIN — IBUPROFEN 800 MG: 800 TABLET ORAL at 13:10

## 2022-07-13 RX ADMIN — ONDANSETRON 4 MG: 2 INJECTION INTRAMUSCULAR; INTRAVENOUS at 09:18

## 2022-07-13 RX ADMIN — SODIUM CHLORIDE, POTASSIUM CHLORIDE, SODIUM LACTATE AND CALCIUM CHLORIDE: 600; 310; 30; 20 INJECTION, SOLUTION INTRAVENOUS at 09:12

## 2022-07-13 RX ADMIN — ROCURONIUM BROMIDE 40 MG: 10 INJECTION, SOLUTION INTRAVENOUS at 09:18

## 2022-07-13 RX ADMIN — GLYCOPYRROLATE 0.2 MG: 0.2 INJECTION, SOLUTION INTRAMUSCULAR; INTRAVENOUS at 09:47

## 2022-07-13 RX ADMIN — IBUPROFEN 800 MG: 800 TABLET ORAL at 23:16

## 2022-07-13 RX ADMIN — PROPOFOL 170 MG: 10 INJECTION, EMULSION INTRAVENOUS at 09:18

## 2022-07-13 RX ADMIN — BENZOCAINE AND LEVOMENTHOL: 200; 5 SPRAY TOPICAL at 01:33

## 2022-07-13 RX ADMIN — IBUPROFEN 800 MG: 800 TABLET ORAL at 06:27

## 2022-07-13 RX ADMIN — HUMAN RHO(D) IMMUNE GLOBULIN 300 MCG: 1500 SOLUTION INTRAMUSCULAR; INTRAVENOUS at 05:44

## 2022-07-13 RX ADMIN — MIDAZOLAM 2 MG: 1 INJECTION INTRAMUSCULAR; INTRAVENOUS at 09:18

## 2022-07-13 RX ADMIN — DEXAMETHASONE SODIUM PHOSPHATE 10 MG: 10 INJECTION, SOLUTION INTRAMUSCULAR; INTRAVENOUS at 09:18

## 2022-07-13 RX ADMIN — LIDOCAINE HYDROCHLORIDE 20 MG: 10 INJECTION, SOLUTION INFILTRATION; PERINEURAL at 09:18

## 2022-07-13 ASSESSMENT — ACTIVITIES OF DAILY LIVING (ADL)
ADLS_ACUITY_SCORE: 18
ADLS_ACUITY_SCORE: 19
ADLS_ACUITY_SCORE: 18
ADLS_ACUITY_SCORE: 19
ADLS_ACUITY_SCORE: 18

## 2022-07-13 ASSESSMENT — ENCOUNTER SYMPTOMS: DYSRHYTHMIAS: 1

## 2022-07-13 NOTE — ANESTHESIA PREPROCEDURE EVALUATION
Anesthesia Pre-Procedure Evaluation    Patient: Margarita Roper   MRN: [id number] : 1990        Procedure : Procedure(s):  LIGATION, FALLOPIAN TUBE, POSTPARTUM          Past Medical History:   Diagnosis Date     Abnormal Pap smear of cervix      Allergic rhinitis      Cervical high risk HPV (human papillomavirus) test positive 2010     Depression      Depressive disorder      History of kidney stones      HPV (human papilloma virus) infection      Intermittent asthma      PVC's (premature ventricular contractions)       Past Surgical History:   Procedure Laterality Date     COLPOSCOPY      early 20's after an abnormal pap smear     EXTRACTION(S) DENTAL       ORTHOPEDIC SURGERY       WISDOM TOOTH EXTRACTION        Allergies   Allergen Reactions     Penicillins Rash and Hives     Ampicillin Sodium      Other [No Clinical Screening - See Comments]      Allergic to corn     Lactalbumin Other (See Comments)     Bloated, constipated      Social History     Tobacco Use     Smoking status: Former Smoker     Packs/day: 0.30     Years: 6.00     Pack years: 1.80     Types: Cigarettes     Smokeless tobacco: Never Used     Tobacco comment: 1-2 cigarettes per week   Substance Use Topics     Alcohol use: Not Currently     Comment: social      Wt Readings from Last 1 Encounters:   22 81.6 kg (180 lb)        Anesthesia Evaluation            ROS/MED HX  ENT/Pulmonary:     (+) asthma     Neurologic:  - neg neurologic ROS     Cardiovascular:     (+) -----dysrhythmias, PVCs,     METS/Exercise Tolerance: >4 METS    Hematologic:  - neg hematologic  ROS     Musculoskeletal:  - neg musculoskeletal ROS     GI/Hepatic:  - neg GI/hepatic ROS     Renal/Genitourinary:  - neg Renal ROS     Endo:  - neg endo ROS     Psychiatric/Substance Use:  - neg psychiatric ROS     Infectious Disease:  - neg infectious disease ROS     Malignancy:       Other:      (+) Possibly pregnant (delivered last night), ,         Physical  Exam    Airway        Mallampati: III   TM distance: > 3 FB   Neck ROM: full   Mouth opening: > 3 cm    Respiratory Devices and Support         Dental       (+) chipped      Cardiovascular   cardiovascular exam normal          Pulmonary   pulmonary exam normal                OUTSIDE LABS:  CBC:   Lab Results   Component Value Date    WBC 8.2 07/12/2022    WBC 7.3 03/25/2022    HGB 12.0 07/12/2022    HGB 12.7 06/22/2022    HCT 34.6 (L) 07/12/2022    HCT 33.3 (L) 03/25/2022     07/12/2022     03/25/2022     BMP:   Lab Results   Component Value Date     03/25/2022    POTASSIUM 3.7 03/25/2022    CHLORIDE 107 03/25/2022    CO2 22 03/25/2022    BUN 5 (L) 03/25/2022    CR 0.62 03/25/2022    GLC 96 03/25/2022     COAGS: No results found for: PTT, INR, FIBR  POC:   Lab Results   Component Value Date    HCG Negative 03/11/2019     HEPATIC:   Lab Results   Component Value Date    ALBUMIN 2.9 (L) 03/25/2022    PROTTOTAL 5.8 (L) 03/25/2022    ALT 13 03/25/2022    AST 13 03/25/2022    ALKPHOS 114 03/25/2022    BILITOTAL 0.2 03/25/2022     OTHER:   Lab Results   Component Value Date    A1C 5.0 01/14/2022    JT 8.8 03/25/2022    TSH 1.42 01/14/2022    T4 0.86 01/14/2022       Anesthesia Plan    ASA Status:  2   NPO Status:  NPO Appropriate    Anesthesia Type: General.     - Airway: ETT   Induction: Intravenous, RSI, Propofol.   Maintenance: Balanced.        Consents    Anesthesia Plan(s) and associated risks, benefits, and realistic alternatives discussed. Questions answered and patient/representative(s) expressed understanding.     - Discussed: Risks, Benefits and Alternatives for BOTH SEDATION and the PROCEDURE were discussed     - Discussed with:  Patient      - Extended Intubation/Ventilatory Support Discussed: No.      - Patient is DNR/DNI Status: No    Use of blood products discussed: No .     Postoperative Care    Pain management: IV analgesics, Multi-modal analgesia.   PONV prophylaxis: Ondansetron (or  other 5HT-3), Dexamethasone or Solumedrol     Comments:    Other Comments: GETA    RSI with succ    Ponv ppx    D/w patient best anesthetic and she agreed to GETA.            Fina Shah MD

## 2022-07-13 NOTE — ANESTHESIA POSTPROCEDURE EVALUATION
Patient: Margarita OCHOA Persons    Procedure: * No procedures listed *       Anesthesia Type:  Epidural    Note:  Disposition: Inpatient   Postop Pain Control: Uneventful            Sign Out: Well controlled pain   PONV:    Neuro/Psych: Uneventful            Sign Out: Acceptable/Baseline neuro status   Airway/Respiratory: Uneventful            Sign Out: Acceptable/Baseline resp. status   CV/Hemodynamics: Uneventful            Sign Out: Acceptable CV status; No obvious hypovolemia; No obvious fluid overload   Other NRE: NONE   DID A NON-ROUTINE EVENT OCCUR? No           Last vitals:  Vitals:    07/13/22 0119 07/13/22 0134 07/13/22 0555   BP: 123/58 126/55 117/62   Resp: 16 16 (!) 78   Temp:   36.6  C (97.9  F)   SpO2:   98%       Electronically Signed By: Nic Barrios MD  July 13, 2022  6:47 AM

## 2022-07-13 NOTE — ANESTHESIA CARE TRANSFER NOTE
Patient: Margarita Roper    Procedure: Procedure(s):  LIGATION, FALLOPIAN TUBE, POSTPARTUM       Diagnosis:  (normal spontaneous vaginal delivery) [O80]  Diagnosis Additional Information: No value filed.    Anesthesia Type:   General     Note:    Oropharynx: oropharynx clear of all foreign objects  Level of Consciousness: drowsy  Oxygen Supplementation: face mask  Level of Supplemental Oxygen (L/min / FiO2): 8  Independent Airway: airway patency satisfactory and stable  Dentition: dentition unchanged  Vital Signs Stable: post-procedure vital signs reviewed and stable  Report to RN Given: handoff report given  Patient transferred to: PACU    Handoff Report: Identifed the Patient, Identified the Reponsible Provider, Reviewed the pertinent medical history, Discussed the surgical course, Reviewed Intra-OP anesthesia mangement and issues during anesthesia, Set expectations for post-procedure period and Allowed opportunity for questions and acknowledgement of understanding      Vitals:  Vitals Value Taken Time   /68 22 0949   Temp 36.5  C (97.7  F) 22 0949   Pulse 80 22 0951   Resp 18 22 0951   SpO2 100 % 22 0951   Vitals shown include unvalidated device data.    Electronically Signed By: GAURAV Montoya CRNA  2022  9:52 AM

## 2022-07-13 NOTE — LACTATION NOTE
"This note was copied from a baby's chart.  Rounded on family for lactation support per provider request.  Baby Jacquelin is the second boy for his family.  Margarita stated that her first born breast fed \"easy peasy\".  Her concern with this baby is for him to get a deep enough latch.  Margarita had just returned from her PPTL surgery.  Her preferred position was cradle however the football hold was easier to do without surgical pain being increased.   LC assisted Margarita with a deep asymmetrical latch using the flipple technique in a football hold position.   Margarita verbalized that she noticed a difference in this latch with increased intensity and no pain.  Audible swallows were heard intermittently.  Baby self unlatched and Margarita was able to independently burp and latch baby on the other side comfortably.      Directed mom to hand expression video and breastfeeding support on [employer]. for home reference.  Educated/reviewed milk production of supply and demand.    Encouraged mom to breastfeed on demand with a goal of 8-12 feedings per day to help milk production. Reviewed expectation of full milk arriving by 3-5 days of life with the assessment on her AVS for assistance.   Educated/reviewed signs of milk transfer with gentle tug at the breast, audible swallows and wet and soiled diapers per the education folder I & O.   Reviewed use of education folder for self learning, lactation and community support, indicators to call MD and maternal/family well being.    Mom has a pump for home use and is familiar with it's use.     Verena Linares RNC, IBCLC      "

## 2022-07-13 NOTE — ANESTHESIA POSTPROCEDURE EVALUATION
Patient: Margarita Roper    Procedure: Procedure(s):  LIGATION, FALLOPIAN TUBE, POSTPARTUM       Anesthesia Type:  General    Note:  Disposition: Inpatient   Postop Pain Control: Uneventful            Sign Out: Well controlled pain   PONV: No   Neuro/Psych: Uneventful            Sign Out: Acceptable/Baseline neuro status   Airway/Respiratory: Uneventful            Sign Out: Acceptable/Baseline resp. status   CV/Hemodynamics: Uneventful            Sign Out: Acceptable CV status; No obvious hypovolemia; No obvious fluid overload   Other NRE: NONE   DID A NON-ROUTINE EVENT OCCUR? No    Event details/Postop Comments:  No issues.             Last vitals:  Vitals Value Taken Time   /68 07/13/22 1020   Temp 36.5  C (97.7  F) 07/13/22 0949   Pulse 78 07/13/22 1023   Resp 22 07/13/22 1023   SpO2 99 % 07/13/22 1023   Vitals shown include unvalidated device data.    Electronically Signed By: Fina Shah MD  July 13, 2022  10:24 AM

## 2022-07-13 NOTE — PROGRESS NOTES
Labor Progress Note:    Patient Name:  Maragrita Roper  :      1990  MRN:      [id number]        Subjective:  Margarita is coping well with contractions. She reports increased pelvic pressure and an intermittent urge to push. Adequate PO fluid intake.  and friend supportive at beside      Objective:  /59   Temp 98  F (36.7  C) (Oral)   Resp 16   LMP 10/09/2021   SpO2 (!) 87%     FHR: 135, moderate variability, isolated variable deceleration; previous prolonged deceleration witnessed by RN; CNM/SNM saw on monitor and arrived to room before being called by RN; RN had turned pitocin off  Contractions: q 2-3 minutes, moderate-firm to palpation.  Cervix: 9-10, 100%, 1, soft      Assessment:    - Active labor  - Category 2 FHTs  - AROM of forebag with clear fluid  - GBS Negative  - Rh Negative  - History of depression  - Epidural anesthesia        Plan:   - Pitocin off per RN; if contractions maintain adequacy plan to not restart  - Continuous fetal monitoring  - Routine CNM support & management. Encourage position changes, ambulation, rest as desired.  - Anticipate progress and NSVB.   - Plan Rhogam PP and cord blood collection for blood type O Negative  - Reevaluate progress in 1-2 hours or sooner with a change in status.   - Plan PPTL while inpatient    Total time spent with patient: 30 minutes, of which >50% time spent counseling and coordination of care.    Patient was seen with Stacey Schwartz, Student Nurse Midwife who was present for learning. I personally assessed, examined and made clinical decisions reflected in the documentation.     Provider:  GAURAV Mcmanus, HAWA        Date:  2022  Time:

## 2022-07-13 NOTE — ANESTHESIA PROCEDURE NOTES
Airway       Patient location during procedure: OR       Procedure Start/Stop Times: 7/13/2022 9:18 AM  Staff -        Other Anesthesia Staff: Lotus Aguiar       Performed By: EFE  Consent for Airway        Urgency: elective  Indications and Patient Condition       Indications for airway management: thanh-procedural       Induction type:intravenous       Mask difficulty assessment: 1 - vent by mask    Final Airway Details       Final airway type: endotracheal airway       Successful airway: ETT - single  Endotracheal Airway Details        ETT size (mm): 7.0       Cuffed: yes       Successful intubation technique: direct laryngoscopy       DL Blade Type: Vela 2       Grade View of Cords: 1       Adjucts: stylet and tooth guard       Position: Right       Measured from: gums/teeth       Secured at (cm): 20       Bite block used: None    Post intubation assessment        Placement verified by: capnometry, equal breath sounds and chest rise        Number of attempts at approach: 1       Number of other approaches attempted: 0       Secured with: silk tape       Ease of procedure: easy       Dentition: Intact and Unchanged    Medication(s) Administered   Medication Administration Time: 7/13/2022 9:18 AM    Additional Comments       RISHI Zamora CRNA, observed SRNA intubate

## 2022-07-13 NOTE — L&D DELIVERY NOTE
OB Vaginal Delivery Note    Margarita Roper MRN# [id number]   Age: 32 year old YOB: 1990       GA: 40w0d  GP:   Labor Complications:  PROM, Prolonged 2nd stage of labor  Delivery QBL: 50 mL  Delivery Type: Vaginal, Spontaneous   ROM to Delivery Time: (Delivered) Hours: 17 Minutes: 26   Weight:     1 Minute 5 Minute 10 Minute   Apgar Totals: 8   9        SHANIA MAJANO;BENITA CAI;ROCKY CORADO     Delivery Details:  Margarita Roper, a 32 year old  female delivered a viable infant with apgars of 8  and 9 . Patient was fully dilated and pushing after 3 hours and 5 minutes in active labor. Prolonged 2nd stage of labor lasting 2 hours and 36 minutes. Delivery was via vaginal, spontaneous  to a sterile field under epidural  anesthesia. Infant delivered in vertex  left  occiput  anterior  position. Anterior and posterior shoulders delivered without difficulty. The cord was clamped, cut twice and 3 vessels  were noted. Cord blood was obtained in routine fashion with the following disposition: lab .      Cord complications: nuchal   Placenta delivered at  . Placental disposition was Hospital disposal . Fundal massage performed and fundus found to be firm.     Episiotomy: none    Perineum, vagina, cervix were inspected, and the following lacerations were noted:   Perineal lacerations: 2nd                Any lacerations were repaired in the usual fashion using 3-0 Vicryl rapide    Excellent hemostasis was noted. Needle count correct. Infant and patient in delivery room in good and stable condition.        Persons, Male-Margarita [[id number]]    Labor Event Times    Labor onset date: 22 Onset time:  5:45 PM   Dilation complete date: 22 Complete time:  8:50 PM   Start pushing date/time: 2022      Labor Events     labor?: No   steroids: None  Labor Type: Spontaneous  Predominate monitoring during 1st stage: continuous electronic fetal monitoring      Antibiotics received during labor?: No     Rupture date/time: 22 0600   Rupture type: Spontaneous rupture of membranes occuring during spontaneous labor or augmentation  Fluid color: Clear  Fluid odor: Normal     Induction: Oxytocin  Induction date/time:     Cervical ripening date/time:     Indications for induction: Premature ROM     Augmentation: AROM  Indications for augmentation: Prolonged ROM  1:1 continuous labor support provided by?: provider       Delivery/Placenta Date and Time    Delivery Date: 22 Delivery Time: 11:26 PM   Oxytocin given at the time of delivery: after delivery of baby  Delivering clinician: Divya Lr CNM   Other personnel present at delivery:  Provider Role   Kalina Majano RN Riemer, Kaitlin, Stacey Lepe          Vaginal Counts     Initial count performed by 2 team members:  Two Team Members   Adeline Majano RN       Needles Suture Needles Sponges (RETIRED) Instruments   Initial counts       Added to count 2 1 5    Relief counts       Final counts             Placed during labor Accounted for at the end of labor   FSE NA NA   IUPC NA NA   Cervidil NA NA              Final count performed by 2 team members:  Two Team Members   Adeline Majano RN      Final count correct?: Yes     Apgars    Living status: Living   1 Minute 5 Minute 10 Minute 15 Minute 20 Minute   Skin color: 0  1       Heart rate: 2  2       Reflex irritability: 2  2       Muscle tone: 2  2       Respiratory effort: 2  2       Total: 8  9       Apgars assigned by: ADELAIDE MAJANO RN     Cord    Vessels: 3 Vessels    Cord Complications: Nuchal   Nuchal Intervention: reduced         Nuchal cord description: loose nuchal cord         Cord Blood Disposition: Lab    Gases Sent?: No    Delayed cord clamping?: Yes    Cord Clamping Delay (seconds): >120 seconds    Stem cell collection?: No        Resuscitation    Methods: None     Skin to Skin and Feeding Plan    Skin to skin initiation date/time:  1/7/1841    Skin to skin with: Mother  Skin to skin end date/time:     Breastfeeding initiated date/time: 7/13/2022 0010     Labor Events and Shoulder Dystocia    Fetal Tracing Prior to Delivery: Category 2  Fetal Tracing Comments: Moderate variability present throughout, intermittent variables during second stage  Shoulder dystocia present?: Neg     Delivery (Maternal) (Provider to Complete) ([id number])    Episiotomy: None  Perineal lacerations: 2nd Repaired?: Yes   Repair suture: 3-0 Vicryl  Number of repair packets: 1  Genital tract inspection done: Pos     Blood Loss  Mother: Margarita Roper #[id number]   Start of Mother's Information    Delivery Blood Loss  07/12/22 1745 - 07/13/22 0024    Delivery QBL (mL) Hospital Encounter 50 mL    Total  50 mL         End of Mother's Information  Mother: Margarita Roper #[id number]          Delivery - Provider to Complete ([id number])    Delivering clinician: Pehl, Divya L, CNM  CNM Care: Exclusive CNM care in labor  Attempted Delivery Types (Choose all that apply): Spontaneous Vaginal Delivery  Delivery Type (Choose the 1 that will go to the Birth History): Vaginal, Spontaneous                   Other personnel:  Provider Role   Kalina Acevedo, Kailey Powell RN Stave, [place]                 Placenta    Removal: Spontaneous  Disposition: Hospital disposal           Anesthesia    Method: Epidural                Presentation and Position    Presentation: Vertex    Position: Left Occiput Anterior               Patient was seen with Stacey Schwartz Student Nurse Midwife who was present for learning. I personally assessed, examined and made clinical decisions reflected in the documentation.     Provider: GAURAV Mcmanus CNM

## 2022-07-13 NOTE — PROGRESS NOTES
22 1315   Quick Adds   Type of Visit Initial PT Evaluation   Living Environment   People in Home spouse;child(tiny), dependent   Current Living Arrangements house   Home Accessibility stairs to enter home;stairs within home   Number of Stairs, Main Entrance 2   Stair Railings, Main Entrance railing on left side (ascending)   Number of Stairs, Within Home, Primary greater than 10 stairs   Stair Railings, Within Home, Primary railing on left side (ascending)   Living Environment Comments able to live on one level   Self-Care   Usual Activity Tolerance excellent   Current Activity Tolerance good   Equipment Currently Used at Home none   Fall history within last six months no   General Information   Onset of Illness/Injury or Date of Surgery 22   Referring Physician Ning Salas CNM   Patient/Family Therapy Goals Statement (PT) less pain   Pertinent History of Current Problem (include personal factors and/or comorbidities that impact the POC) s/p  and tubal ligation   Existing Precautions/Restrictions no known precautions/restrictions   Weight-Bearing Status - LLE full weight-bearing   Weight-Bearing Status - RLE full weight-bearing   Strength (Manual Muscle Testing)   Strength Comments WFL   Bed Mobility   Bed Mobility supine-sit;sit-supine   Supine-Sit Bosque (Bed Mobility) supervision   Sit-Supine [place] (Bed Mobility) supervision   Transfers   Transfers sit-stand transfer   Sit-Stand Transfer   Sit-Stand Bos[place] (Transfers) contact guard   Assistive Device (Sit-Stand Transfers) walker, front-wheeled   Gait/Stairs (Locomotion)   Bosque Level (Gait) contact guard   Assistive Device (Gait) walker, front-wheeled   Distance in Feet (Required for LE Total Joints) 10'   Pattern (Gait) step-through;step-to   Deviations/Abnormal Patterns (Gait) gait speed decreased;stride length decreased   Clinical Impression   Criteria for Skilled Therapeutic Intervention Yes, treatment  indicated   PT Diagnosis (PT) impaired functional mobility   Influenced by the following impairments weakness, pain   Functional limitations due to impairments gait, stairs, transfers   Clinical Presentation (PT Evaluation Complexity) Stable/Uncomplicated   Clinical Presentation Rationale pt presents as diagnosed   Clinical Decision Making (Complexity) low complexity   Planned Therapy Interventions (PT) balance training;bed mobility training;gait training;home exercise program;patient/family education;ROM (range of motion);stair training;strengthening;transfer training   Anticipated Equipment Needs at Discharge (PT) other (see comments)  (None)   Risk & Benefits of therapy have been explained care plan/treatment goals reviewed;patient   PT Discharge Planning   PT Discharge Recommendation (DC Rec) (S)  home with assist   PT Rationale for DC Rec home with assist from family as needed   Plan of Care Review   Plan of Care Reviewed With patient   Total Evaluation Time   Total Evaluation Time (Minutes) 10   Physical Therapy Goals   PT Frequency Daily   PT Predicted Duration/Target Date for Goal Attainment 07/16/22   PT Goals Transfers;Gait;Stairs   PT: Transfers Independent;Sit to/from stand   PT: Gait Independent;100 feet   PT: Stairs Rail on right;2 stairs  (CGA, HHA)

## 2022-07-13 NOTE — PROGRESS NOTES
"Vaginal Delivery Postpartum Day 1      Patient Name:  Margarita Roper  :      1990  MRN:      [id number]      Subjective:  Margarita, \"Xochitl,\" is doing well. She has just returned from her postpartum tubal ligation procedure. Integrating her birth experience. Pleased with her care. Notes that the pushing/birth was harder than with her first baby.  So grateful to have baby here and be done.  Voiding without difficulty since epidural wore off. Ambulation has been difficult. She reports pain in her left groin and pelvic area, which makes it difficult and painful to ambulate or move that leg. She states it has been progressively improving, but it is still painful and having a hard time lifting leg and moving it forward in a walking motion.  Open to further evaluation for this. Tolerating a normal diet. No BM yet.  Bleeding is WNL.  Pain is moderately controlled with current medications. She is having a lot of pain in her perineum and procedural incision as well.  Baby, \"Hemanth,\" is breastfeeding on cue.  Postpartum contraception plans include PPTL. Support at home identified as , Vanhtha and family.   Has a history of depression, but denies any depressive symptoms currently. \"I am just so excited right now.\"        Objective:  /62 (BP Location: Right arm)   Pulse 71   Temp 97.7  F (36.5  C) (Oral)   Resp 18   Ht 1.753 m (5' 9\")   Wt 81.6 kg (180 lb)   LMP 10/09/2021   SpO2 98%   Breastfeeding Unknown   BMI 26.58 kg/m      General:  Pleasant, articulate, well-nourished female.  Not in any apparent distress.  Breasts:  Breasts soft, non-tender, nipples intact and everted  Abdomen: Slightly distended and tender s/p tubal ligation.  Clean bandage present on umbilical area.  Fundus firm @ umbilicus +1 (difficult to assess due to post tubal, and states bladder is very full, midline.  Bleeding:  Minimal rubra lochia, without clots or odor.  Vulva: Laceration well approximated. Moderate edema on labia " "majora and perineum.  No bruising. No signs of hematoma.   Rectal:  No hemorrhoids.  Legs:  No edema.  No varicosities.  Negative caroline's sign. Nearly full ROM, though sluggish. Pubic symphysis tender.    Recent labs:  Hemoglobin 12.0 (22)    Immunization History   Administered Date(s) Administered     DTAP (<7y) 1988, 1988, 1988, 1990     FLU 6-35 months 2010     HPV Quadrivalent 2007, 2007, 2009     Historical DTP/aP 1988, 1988, 1988, 1990     Influenza (IIV3) PF 2010     MMR 1989     Meningococcal (Menactra ) 2006     Meningococcal (Menveo ) 2006     Meningococcal,unspecified 2006     Polio, Unspecified  1988, 1988, 1990     Poliovirus, inactivated (IPV) 1988, 1988, 1990, 2011     TDAP Vaccine (Adacel) 2010     Twinrix A/B 2010, 2011     Typhoid IM 2010         Assessment:   -Postpartum Day 1, NSVB.  -Breastfeeding  -S/p postpartum tubal ligation this morning  -Stable postpartum course.  -Pelvic pain - seems to be pubic bone and L groin in terms of muscular/skeletal   -Vulvar edema from long pushing and perineal laceration discomfort  -History of depression.   -Rh negative, rhogam given at 0544      Plan:    -Continue to monitor leg/pelvic pain. Inpatient PT consult put in.  -Ordered mag packs for vulvar edema and pain.  - Asked lactation consultant to see Xochitl for her questions about latch.  -Discussed thanh care, breast care, pain management, breastfeeding initiation, bowel changes, postpartum exercises, postpartum mood changes and adjustments, postpartum \"baby-blues\" vs.  mood and anxiety disorders, sleep changes, required support.   -Plan for today: encouraged rest, skin-to-skin, breastfeeding on cue, adequate pain control, tub soaks, and limiting visitors.   -Anticipate two day stay with plan for discharge tomorrow.     Patient was " seen with Stacey Schwartz, Student Nurse Midwife who was present for learning. I personally assessed, examined and made clinical decisions reflected in the documentation.       Provider:  GAURAV Lynne/HAWA      Date:  7/13/2022  Time:  11:58 AM

## 2022-07-13 NOTE — PLAN OF CARE
Problem: Plan of Care - These are the overarching goals to be used throughout the patient stay.    Goal: Optimal Comfort and Wellbeing  Outcome: Ongoing, Progressing    Pain managed with scheduled Toradol. Dressing CDI. Bonding well with baby.    Rukhsana Patel RN

## 2022-07-13 NOTE — OP NOTE
.PROCEDURE: Postpartum Tubal Ligation    DATE OF SERVICE: 7/13/2022    PREOPERATIVE DIAGNOSIS: Multiparity, desires sterilization.    POSTOPERATIVE DIAGNOSIS: Multiparity, desires sterilization.    PROCEDURE NOTE: Postpartum Tubal Ligation    SURGEON(S): Gen Duenas MD    ANESTHESIA: General    ESTIMATED BLOOD LOSS: minimal    SPECIMENS: portions of each tube    COMPLICATIONS: None.    HISTORY   This is a multiparous female who desires permanent sterilization. The risks, benefits, and alternatives were discussed including a failure rate of 1/200, irreversibility and increased risk of an ectopic with tubal ligation. She expressed understanding and wished to proceed.     DESCRIPTION OF PROCEDURE   The patient was brought to the operating room and after induction of anesthesia was prepped and draped in the supine position.  A time out was called and the patient and the procedure were verified.  A small incision was made periumbilically.  This was carried down to the fascia, which was divided bilaterally.  The peritoneum was entered sharply.  The left fallopian tube was brought up through the incision site and traced out to its fimbrial end.  A knuckle of the tube was double ligated with plain gut and resected.  Hemostasis was observed.  The same procedure was carried out on the contralateral side.  Once this was done, 0-vicryl was used to close the fascia in a running manner and monocryl was used to close the skin in a subcuticular manner. Sponge and instrument counts were correct.  The patient tolerated the procedure well and was taken to the recovery room in good condition.       Gen Duenas MD    CC1:

## 2022-07-13 NOTE — CONSULTS
"ACUPUNCTURIST TREATMENT NOTE    Name: Margarita Roper  :  1990  MRN:  [id number]    Acupuncture Treatment  Patient Type: Maternity  Intervention Reason: Lactation  Patient complaint:: Insufficient lactation, relaxation  Initial insertions: Lisbeth jacobs, (L) Dawkins men, Dandre 17, Sp 6, St 36, 37, Kidney Neversink     \"Risks and benefits of acupuncture were discussed with patient. Consent for treatment was given. We thank you for the referral.\"     David Vaca    Date:  2022  Time:  4:32 PM    "

## 2022-07-13 NOTE — PLAN OF CARE
Problem: Plan of Care - These are the overarching goals to be used throughout the patient stay.    Goal: Optimal Comfort and Wellbeing  Outcome: Ongoing, Progressing  Intervention: Monitor Pain and Promote Comfort  Recent Flowsheet Documentation  Taken 2022 0134 by Kalina Acevedo RN  Pain Management Interventions: medication (see MAR)  Taken 2022 1915 by Kalina Acevedo RN  Pain Management Interventions: medication (see MAR)  Intervention: Provide Person-Centered Care  Recent Flowsheet Documentation  Taken 2022 0134 by Kalina Acevedo RN  Trust Relationship/Rapport:   care explained   emotional support provided   empathic listening provided   questions answered   questions encouraged   reassurance provided   thoughts/feelings acknowledged  Taken 2022 1935 by Kalina Acevedo RN  Trust Relationship/Rapport:   care explained   choices provided   empathic listening provided   questions answered   questions encouraged   reassurance provided   thoughts/feelings acknowledged     Pt VSS and assessments WNL. Pt has been up and bathed; linen changed. Pitocin is infusing in L PIV at 100mL/hr. Pt has eaten and drank and tolerating PO intake. Fundus is firm and 2 below umbilicus; bleeding is minimal. Toradol was administered for pain x1. Ice and Benzocaine spray to perineum. Pt is breastfeeding  on demand and is attentive to baby. Spouse is at bedside and helpful. Pt was not able to void yet; was straight cathed at 2248; will attempt to void again in a couple hours.

## 2022-07-13 NOTE — PLAN OF CARE
Problem: Urinary Retention (Postpartum Vaginal Delivery)  Goal: Effective Urinary Elimination  2022 0645 by Kalina Acevedo, RN  Outcome: Ongoing, Progressing  2022 0221 by Kalina Acevedo, RN  Outcome: Ongoing, Progressing     Pt has voided several times. Pt has not eaten since 0200. Per Dr. Lin, pt to remain NPO; on schedule for tubal 09/0930. Pt had a sip of water at 0627 w/ ibuprofen. PIV is SL. Fundus is firm and 2 under umbilicus, minimal bleeding. Ibuprofen administered for pain management. Ice applied to perineum for pain and swelling. Currently pt rates pain 4/10. 300mcg IV Rhogam was administered due to 's blood type resulting as O+.

## 2022-07-14 VITALS
HEIGHT: 69 IN | TEMPERATURE: 98.1 F | SYSTOLIC BLOOD PRESSURE: 118 MMHG | OXYGEN SATURATION: 98 % | WEIGHT: 180 LBS | HEART RATE: 71 BPM | RESPIRATION RATE: 16 BRPM | BODY MASS INDEX: 26.66 KG/M2 | DIASTOLIC BLOOD PRESSURE: 66 MMHG

## 2022-07-14 LAB
PATH REPORT.COMMENTS IMP SPEC: NORMAL
PATH REPORT.COMMENTS IMP SPEC: NORMAL
PATH REPORT.FINAL DX SPEC: NORMAL
PATH REPORT.GROSS SPEC: NORMAL
PATH REPORT.MICROSCOPIC SPEC OTHER STN: NORMAL
PATH REPORT.RELEVANT HX SPEC: NORMAL
PHOTO IMAGE: NORMAL

## 2022-07-14 PROCEDURE — 250N000013 HC RX MED GY IP 250 OP 250 PS 637: Performed by: OBSTETRICS & GYNECOLOGY

## 2022-07-14 PROCEDURE — 250N000013 HC RX MED GY IP 250 OP 250 PS 637: Performed by: ADVANCED PRACTICE MIDWIFE

## 2022-07-14 RX ORDER — IBUPROFEN 600 MG/1
600 TABLET, FILM COATED ORAL EVERY 6 HOURS PRN
COMMUNITY
Start: 2022-07-14 | End: 2022-07-26

## 2022-07-14 RX ORDER — ACETAMINOPHEN 325 MG/1
650 TABLET ORAL EVERY 4 HOURS PRN
COMMUNITY
Start: 2022-07-14 | End: 2022-07-26

## 2022-07-14 RX ADMIN — SENNOSIDES AND DOCUSATE SODIUM 1 TABLET: 50; 8.6 TABLET ORAL at 08:12

## 2022-07-14 RX ADMIN — IBUPROFEN 800 MG: 800 TABLET ORAL at 05:00

## 2022-07-14 RX ADMIN — ACETAMINOPHEN 975 MG: 325 TABLET ORAL at 05:00

## 2022-07-14 RX ADMIN — DOCUSATE SODIUM 100 MG: 100 CAPSULE, LIQUID FILLED ORAL at 08:12

## 2022-07-14 ASSESSMENT — ACTIVITIES OF DAILY LIVING (ADL)
ADLS_ACUITY_SCORE: 18

## 2022-07-14 NOTE — PLAN OF CARE
Discharge paperwork and teaching with patient has been completed. RN gave adequate time to answer questions and patient verbally stated they understand information.    Rukhsana Patel RN

## 2022-07-14 NOTE — PROGRESS NOTES
Physical Therapy Discharge Summary    Reason for therapy discharge:    Discharged to home.    Progress towards therapy goal(s). See goals on Care Plan in [facility] electronic health record for goal details.  Goals partially met.  Barriers to achieving goals:   Eval only prior to discharge. Pt reports improved symptoms prior to going home.    Therapy recommendation(s):    No further therapy is recommended.

## 2022-07-14 NOTE — CONSULTS
Integrative Therapy Consult    Healing PresenceYes  Essential Oils: Topical (EO/Topical Oil)     Hemalatha -  HC, Lavender Massage Oil - HC       Healing Music:       Breathwork:       Guided Imagery:       Acupressure:       Oshibori:       Energy Therapy:       Healing Touch:       Reiki:       Qi Gong:     Massage: Foot      Targeted Massage:    Sleep Promotion:       Other Therapy:       Intervention Reason: Edema     Pre and Post Session Scores: Patient Desires Treatment: yes                             Delivery:         Referrals:      Susy Vyas

## 2022-07-14 NOTE — DISCHARGE SUMMARY
"Vaginal Delivery Postpartum Day 2    Patient Name:  Margarita Roper  :      1990  MRN:      [id number]    Assessment:   Day 1 postpartum, , 2nd degree laceration repaired  Breastfeeding going well  S/p PPTL  Stable postpartum course.    Plan:    -New mom handout information reviewed. Discussed thanh care, breast care, pain management, breastfeeding initiation, bowel changes, return of fertility, postpartum exercises, postpartum mood changes and adjustments, postpartum blues vs. postpartum depression, sleep changes, required support. Written information provided.    -Discharge to home today    -Advised follow-up at 2 and 6 weeks postpartum for routine clinic visits.    Subjective:  Margarita Roper is integrating birth experience.  Feeling muscular discomfort today.  Feels pain medications have been helpful.  Feady for discharge--today is Liams 4th birthday and they would like to discharge ASAP. She is up and active in the room independently, is voiding and ambulating without difficulty without dizziness or calf pain. Tolerating normal diet and passing flatus. Voiding without issue. Bleeding is light without clots. Pain is well controlled with current medications of ibuprofen and tylenol. Baby Hemanth is breastfeeding on cue. Postpartum contraception plans include PPTL--has already been performed.  Expresses some concern because she had discussed salpingectomy with Dr. Godinez but BTL was what was ultimately performed.  Worried about efficacy--reassured.  Wondering about any decrease in risk of ovarian cancer with BTL.  Would prefer to discuss further with Dr. Godinez--offered to set up virtual visit and pt accepts.  Message sent to schedulers.    Objective:  /66 (BP Location: Left arm, Patient Position: Semi-Lin's, Cuff Size: Adult Regular)   Pulse 71   Temp 98.1  F (36.7  C) (Oral)   Resp 16   Ht 1.753 m (5' 9\")   Wt 81.6 kg (180 lb)   LMP 10/09/2021   SpO2 98%   Breastfeeding Unknown   " BMI 26.58 kg/m    Patient Vitals for the past 24 hrs:   BP Temp Temp src Pulse Resp SpO2   07/14/22 0500 118/66 98.1  F (36.7  C) Oral 71 16 --   07/13/22 2300 132/74 98.5  F (36.9  C) Oral 82 16 98 %   07/13/22 1854 122/60 98.2  F (36.8  C) Oral 80 17 97 %   07/13/22 1345 131/61 97.4  F (36.3  C) Oral 66 17 97 %   07/13/22 1245 131/61 97.8  F (36.6  C) Oral 74 18 97 %   07/13/22 1150 127/62 97.7  F (36.5  C) Oral 71 18 98 %   07/13/22 1115 (!) 130/90 97  F (36.1  C) Oral 76 18 97 %   07/13/22 1045 139/68 97.4  F (36.3  C) Oral 86 18 99 %   07/13/22 1030 123/67 97  F (36.1  C) Temporal 85 17 98 %   07/13/22 1020 120/68 -- -- 77 14 99 %   07/13/22 1010 120/69 -- -- 78 25 99 %   07/13/22 1000 118/58 -- -- 87 19 100 %       General Appearance:    Alert, NAD   Breast Exam:   Breasts soft, filling, nipples everted and intact without bruising, creases or cracks noted.   Abdomen:   Soft, non-tender, fundus firm @ U - 1, midline,    non-tender.  PPTL incisions present   Perineum:   Tissues well- approximated, trace edema. Lochia mild, rubra, non-malodorous, without clots.  No hemorrhoids   Legs:     trace edema, denies calf tenderness     Hemoglobin   Date Value Ref Range Status   07/12/2022 12.0 11.7 - 15.7 g/dL Final   ]    Treponema neg x2 in pregnancy  Rh negative--baby was positive.  Has already received Rhogam  COVID-19 negative  Rubella immune    Provider:  GAURAV Mcmanus CNM    Date:  7/14/2022  Time:  9:55 AM

## 2022-07-14 NOTE — PLAN OF CARE
Problem: Plan of Care - These are the overarching goals to be used throughout the patient stay.    Goal: Plan of Care Review/Shift Note  Description: The Plan of Care Review/Shift note should be completed every shift.  The Outcome Evaluation is a brief statement about your assessment that the patient is improving, declining, or no change.  This information will be displayed automatically on your shift note.  Outcome: Ongoing, Progressing  Flowsheets (Taken 7/14/2022 4215)  Plan of Care Reviewed With: patient  Overall Patient Progress: improving  Goal: Optimal Comfort and Wellbeing  Outcome: Ongoing, Progressing  Intervention: Provide Person-Centered Care  Recent Flowsheet Documentation  Taken 7/13/2022 2336 by Leesa Glaser, RN  Trust Relationship/Rapport:   care explained   choices provided   emotional support provided     Problem: Bleeding (Postpartum Vaginal Delivery)  Goal: Hemostasis  Outcome: Ongoing, Progressing

## 2022-07-14 NOTE — PROGRESS NOTES
OBGYN progress note    POD#1 postpartum tubal ligation    Incision is clean, dry and intact  Patient doing well- not requesting narcotics  No follow up needed    Tasneem Barrios MD  [facility]  [phone]

## 2022-07-18 ENCOUNTER — TELEPHONE (OUTPATIENT)
Dept: MIDWIFE SERVICES | Facility: CLINIC | Age: 32
End: 2022-07-18

## 2022-07-18 DIAGNOSIS — N94.9 PAIN OF FEMALE SYMPHYSIS PUBIS: ICD-10-CM

## 2022-07-18 DIAGNOSIS — R52 POSTPARTUM PAIN: Primary | ICD-10-CM

## 2022-07-18 NOTE — TELEPHONE ENCOUNTER
"PHONE NOTE:   (with JEFF Schwartz)    Margarita Roper, \"Xochitl,\" is day 5 postpartum and is calling to report pelvic pain and left leg weakness, difficulty walking. She had an  on 22 with a PPTL before discharging. While she was inpatient, she was experiencing difficulty ambulating with pelvic/pubic symphysis pain radiating to her left groin, making it difficult to lift her left leg.while she was Inpatient, PT was consulted for evaluation and she experienced some relief. She now calls saying the pain has returned and she is having trouble walking. \"I have to take three tiny steps with my left leg to match my right leg.\" She also reports pain while lying on her left side. She denies arm weakness, facial droop, or any stroke-like symptoms. Otherwise she states she is doing well with baby Bhodi.     Outpatient PT referral placed for ASAP evaluation and treatment. Discussed potential need for walker/stabalization and anticipatory guidance. Patient agrees with plan.     Patient was seen with Stacey Schwartz, Student Nurse Midwife who was present for learning. I personally assessed, examined and made clinical decisions reflected in the documentation.     Provider: GAURAV Valentine, HAWA  "

## 2022-07-26 ENCOUNTER — OFFICE VISIT (OUTPATIENT)
Dept: MIDWIFE SERVICES | Facility: CLINIC | Age: 32
End: 2022-07-26
Payer: COMMERCIAL

## 2022-07-26 VITALS — HEIGHT: 69 IN | WEIGHT: 185 LBS | HEART RATE: 88 BPM | BODY MASS INDEX: 27.4 KG/M2

## 2022-07-26 DIAGNOSIS — M89.9 PUBIC BONE PAIN: Primary | ICD-10-CM

## 2022-07-26 DIAGNOSIS — M25.552 HIP PAIN, LEFT: ICD-10-CM

## 2022-07-26 PROCEDURE — 99024 POSTOP FOLLOW-UP VISIT: CPT | Performed by: ADVANCED PRACTICE MIDWIFE

## 2022-07-26 NOTE — PROGRESS NOTES
"Short-Term Postpartum Follow-Up Visit      Subjective:      Margarita \"Jeffrey" is a 32 year old year old female who presents to clinic today for a 2 week postpartum follow-up visit.  She is currently nearly 2 weeks postpartum of a vaginal birth.  The birth happened on 7/13/22.  The baby was 40 weeks gestation at the time of the birth.  She attends the visit today with her  and new baby.    Pain:  Has had some ongoing significant pubic bone and hip pain (had been evaluated by PT when was inpatient for this).  Was significant enough that called recently and talked with on-call CNM.  Was given PT referral.  That appointment is scheduled for a few weeks from now.  Seeing chiropractor in meantime.  Feeling better today with the chiropractic interventions of late.  Feeding:  Going really, really well.  Baby eating and gaining weight.  No problems.  Sleep:  Doing trading on/off so that both Xochitl and Yasmany get rest.  Able to nap.  Baby wake round the clock nearly every 2 hours to feed  Mood:  Feeling well mood wise. EPDS 6.  Bleeding:  Decreasing overall.  Has noticed some tiny (less than raisin) clots.      Concerns:  Was unhappy with her tubal ligation experience.  Consent had been done with Dr Godinez and she loved her, but then surgery with the [facility].  Has a follow up visit with Dr Godinez to discuss things on August 4.        Review of Systems  -A 12 point comprehensive review of systems was negative except as noted above.  -Prenatal history and intrapartum course were also reviewed today.        Objective:      Vitals:    07/26/22 1309   Pulse: 88   Weight: 83.9 kg (185 lb)   Height: 1.74 m (5' 8.5\")       Physical Exam:    General Appearance: Pleasant, articulate, well-groomed, well-nourished female.  Not in any apparent distress.    Abdomen: Soft, non-tender, no masses, tubal scar healing well.  Pelvic:  -External genitalia:  Normal hair distribution, no lesion.  Birth laceration site with approximated edges and " appears to be healing.    -Urethral opening: Without lesions, or tenderness.   -Bladder: Without masses, or tenderness.  -Vagina: Pink, rugated, red blood discharge, small-mod amount.   -Cervix: exam deferred today   -Bimanual: exam deferred today    Extremities: Extremities no edema, no varicosities.        Assessment/Plan:     1)  2 weeks postpartum of a vaginal birth.  Normal healing, mood, feeding, etc  2)  Persistent pelvic pain (pubic bone and hip) post birth.  Seeing chiropractor and has PT appointment in a few weeks.  3) Unhappy with tubal experience with [facility].  Had a visit to talk with Dr Godinez next week in follow up to this.  4) To return for 6 week postpartum visit, or sooner PRN.        Time spent:  Chart review/Pre-charting on 7/26/22: 2 minutes  Face-to-face visit: 18 minutes   Documentation:  10 minutes  Total time spent on day of service:  30-39

## 2022-07-28 ENCOUNTER — TELEPHONE (OUTPATIENT)
Dept: MIDWIFE SERVICES | Facility: CLINIC | Age: 32
End: 2022-07-28

## 2022-08-04 ENCOUNTER — VIRTUAL VISIT (OUTPATIENT)
Dept: OBGYN | Facility: CLINIC | Age: 32
End: 2022-08-04
Payer: COMMERCIAL

## 2022-08-04 DIAGNOSIS — Z09 FOLLOW-UP EXAMINATION FOLLOWING SURGERY: Primary | ICD-10-CM

## 2022-08-04 PROCEDURE — 99213 OFFICE O/P EST LOW 20 MIN: CPT | Mod: 95 | Performed by: OBSTETRICS & GYNECOLOGY

## 2022-08-04 NOTE — PROGRESS NOTES
Margarita is a 32 year old who is being evaluated via a billable telephone visit.      What phone number would you like to be contacted at? [phone]  How would you like to obtain your AVS? Tomasa CARRANZA: The patient is seen via a telephone visit in follow up of her recent delivery and PPTL.  She was not happy with the experience of the tubal ligation, both with needing general anesthesia and with a salpingectomy not being done, but instead a traditional tubal ligation.  She wants to know what the difference in failure rate is between the two procedures.    Outpatient Medications Prior to Visit   Medication Sig Dispense Refill     Prenatal Vit-Fe Fumarate-FA (PRENATAL MULTIVITAMIN W/IRON) 27-0.8 MG tablet Take 1 tablet by mouth daily       vitamin B complex with vitamin C (STRESS TAB) tablet Take 1 tablet by mouth daily        VITAMIN D, CHOLECALCIFEROL, PO Take by mouth daily        zinc gluconate 50 MG tablet Take 50 mg by mouth daily       Fenugreek 500 MG CAPS  (Patient not taking: Reported on 8/4/2022)       No facility-administered medications prior to visit.       Patient is allergic to penicillins, ampicillin sodium, other [no clinical screening - see comments], and lactalbumin.    O:  LMP 10/09/2021           There is no height or weight on file to calculate BMI.    Assessment: 32 year old MWF  with questions around her recent surgery.    Plan:  We discussed that the failure rate is 1/200 for a tubal ligation.  We discussed the surgery report and the Pathology.  We discussed why the surgery timing can get shifted like what happened for her.  Questions were answered to the best of my ability.    24 minutes spent on the date of the encounter doing chart review, review of test results, patient visit and documentation     Phone call duration: 16 minutes    .  ..

## 2022-08-09 ENCOUNTER — TELEPHONE (OUTPATIENT)
Dept: OBGYN | Facility: CLINIC | Age: 32
End: 2022-08-09

## 2022-08-09 NOTE — TELEPHONE ENCOUNTER
Margarita is a 32 yr old  who had  on 22 calls today with report of hemorrhoids. She noted blood in toilet after bowel movement. Has used Tucks pads but does not know what else to use for hemorrhoids. Had them after her first son as well. Reviewed OTC treatments for hemorrhoid relief, enc staying well hydrated and incorporating fiber in diet. Call back prn worsening symptoms or no relief with OTC methods.

## 2022-08-19 ENCOUNTER — PRENATAL OFFICE VISIT (OUTPATIENT)
Dept: MIDWIFE SERVICES | Facility: CLINIC | Age: 32
End: 2022-08-19
Payer: COMMERCIAL

## 2022-08-19 VITALS
WEIGHT: 181 LBS | OXYGEN SATURATION: 98 % | SYSTOLIC BLOOD PRESSURE: 108 MMHG | DIASTOLIC BLOOD PRESSURE: 64 MMHG | BODY MASS INDEX: 26.81 KG/M2 | HEIGHT: 69 IN | HEART RATE: 69 BPM

## 2022-08-19 DIAGNOSIS — Z12.4 CERVICAL CANCER SCREENING: ICD-10-CM

## 2022-08-19 PROCEDURE — 87624 HPV HI-RISK TYP POOLED RSLT: CPT | Performed by: ADVANCED PRACTICE MIDWIFE

## 2022-08-19 PROCEDURE — G0145 SCR C/V CYTO,THINLAYER,RESCR: HCPCS | Performed by: ADVANCED PRACTICE MIDWIFE

## 2022-08-19 PROCEDURE — 99207 PR POST PARTUM EXAM: CPT | Performed by: ADVANCED PRACTICE MIDWIFE

## 2022-08-19 ASSESSMENT — EDINBURGH POSTNATAL DEPRESSION SCALE (EPDS)
I HAVE FELT SAD OR MISERABLE: NOT VERY OFTEN
I HAVE BEEN ABLE TO LAUGH AND SEE THE FUNNY SIDE OF THINGS: AS MUCH AS I ALWAYS COULD
I HAVE BEEN SO UNHAPPY THAT I HAVE BEEN CRYING: NO, NEVER
I HAVE BLAMED MYSELF UNNECESSARILY WHEN THINGS WENT WRONG: NOT VERY OFTEN
I HAVE BEEN SO UNHAPPY THAT I HAVE HAD DIFFICULTY SLEEPING: NOT AT ALL
THE THOUGHT OF HARMING MYSELF HAS OCCURRED TO ME: NEVER
I HAVE BEEN ANXIOUS OR WORRIED FOR NO GOOD REASON: HARDLY EVER
THINGS HAVE BEEN GETTING ON TOP OF ME: NO, MOST OF THE TIME I HAVE COPED QUITE WELL
I HAVE FELT SCARED OR PANICKY FOR NO GOOD REASON: NO, NOT AT ALL
I HAVE LOOKED FORWARD WITH ENJOYMENT TO THINGS: AS MUCH AS I EVER DID
TOTAL SCORE: 4

## 2022-08-19 NOTE — PROGRESS NOTES
"POSTPARTUM EXAM NOTE    SUBJECTIVE:   Margarita Roper is here at 5 1/2 weeks for routine postpartum checkup. She is present today with her  and her  baby, Hemanth.     HPI: Her postpartum period has been complicated by severe pelvic pain (seen by chiropractor, now better), and mastitis (resolved with home measures and frequent nursing, did not require antibiotics). She also has hemorrhoids, which are extremely painful with bowel movements.     DELIVERY DATE: 2022  Provider at birth: Divya Lr CNM   of a viable boy, weight 7lb 15oz with no complications.  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 Normal cervix, adnexae, and uterus without masses or discharge. Pap collected.   PSYCH: mentation appears normal and affect normal/bright      ASSESSMENT:   Normal postpartum exam after .  Lactating mother.    PLAN:  Self-care, diet/nourishment, exercise, sleep reviewed. Continue prenatal vitamin while breastfeeding.  Lactation support as needed.     Pap and HPV today.     RTC in 1 year for routine annual health maintenance, sooner as needed.     Leslie MEANS" GAURAV Berman, CNM  [facility] Nurse-Midwifery  [facility]

## 2022-08-24 LAB
BKR LAB AP GYN ADEQUACY: NORMAL
BKR LAB AP GYN INTERPRETATION: NORMAL
BKR LAB AP HPV REFLEX: NORMAL
BKR LAB AP PREVIOUS ABNORMAL: NORMAL
PATH REPORT.COMMENTS IMP SPEC: NORMAL
PATH REPORT.COMMENTS IMP SPEC: NORMAL
PATH REPORT.RELEVANT HX SPEC: NORMAL

## 2022-08-26 LAB
HUMAN PAPILLOMA VIRUS 16 DNA: NEGATIVE
HUMAN PAPILLOMA VIRUS 18 DNA: NEGATIVE
HUMAN PAPILLOMA VIRUS FINAL DIAGNOSIS: NORMAL
HUMAN PAPILLOMA VIRUS OTHER HR: NEGATIVE

## 2022-08-29 PROBLEM — O26.899 RH NEGATIVE STATE IN ANTEPARTUM PERIOD: Status: RESOLVED | Noted: 2022-01-15 | Resolved: 2022-08-29

## 2022-08-29 PROBLEM — Z67.91 RH NEGATIVE STATE IN ANTEPARTUM PERIOD: Status: RESOLVED | Noted: 2022-01-15 | Resolved: 2022-08-29

## 2022-11-21 ENCOUNTER — HEALTH MAINTENANCE LETTER (OUTPATIENT)
Age: 32
End: 2022-11-21

## 2023-04-16 ENCOUNTER — HEALTH MAINTENANCE LETTER (OUTPATIENT)
Age: 33
End: 2023-04-16

## 2024-05-29 NOTE — PATIENT INSTRUCTIONS
POSTPARTUM INFORMATION AND RESOURCES:     Congratulations on the birth of your baby. We have gathered together some information on staying healthy in the postpartum time including information on exercise, nutrition and mental health. We have also listed some local and national resources for lactation support and mental health support.     If you have questions or concerns and need to speak with a midwife immediately, please call the on-call midwife at [phone].     If you have a non-emergent question or would like to schedule a follow up appointment, please call the clinic midwife at [phone].     Thank you for sharing your birth experience with the [facility].  Congratulations on the birth of your baby!     ---------------------------------------------------------------------------------------------------------  EXERCISE & NUTRITION:      Getting and Staying Active: A Healthy You!   -Why should I exercise? Exercise, being physically active, is very important for all women. Being active can help you:   Lose or maintain weight   Have more energy   Sleep better   Enjoy sex more   Relieve stress and think better   Lower the chance you will have heart disease, high blood pressure, and diabetes   Strengthen your bones and muscles   Have fewer hot flashes if you are older   -How active do I have to be to get the bene?ts of exercise?  Studies show that as little as 15 minutes of moderate exercise--like fast walking or dancing--3 times a week can improve the health of your heart. Ifyou want to get the best benefits from exercise, try to increase your physical activity to at least 30 minutes, 5 times a week. If you have a serious health problem,be sure to talk with your health care provider before starting an exercise program.   Https://[employer]/  Http://www.Virtual Solutions/     @PelvicGuru1  @MyPelvicFloorMuscles  @The.Vagina.Eb     Taking Care of your Health: Health Care Maintenance  Screening recommendations   In all the things women do, taking care of everything and everybody else, they sometimes forget to take care of themselves. This handout reviews the health screenings and vaccines that are recommended for women of all ages. Talk with yourhealth care provider about which tests and vaccines you need. The chart below lists the screening tests and vaccinations recommended for healthy women who do not have a high risk for most diseases.   The recommendations in thischart are guidelines only. For some tests and vaccines, the chart says you should talk to your health care provider.This is because the best recommendations for you depend on your personal health history. Your health care provider may suggest more frequent testing or additional tests ifyou havea higher chance of getting some diseases      Planning Your Family: Developing a Reproductive Life Plan   Planning ahead can help you avoid getting pregnant when you don t want to be pregnant and also be in good health if and when you do decide to become pregnant. Many women have at least one pregnancy in their lives, even if it was not planned. In fact, about half of all pregnancies are not planned. Getting pregnant when you did not plan it can be a problem, or it can turn into a happy event. Planning pregnancy leads to healthier pregnancies, healthier mothers, and healthier families.   Although many women want to have a family, not everyone wants to have children. More and more women are childless by choice (also known as childfree). Whether to have children is a personal choice that only you can make. It s okay not to want children! If you never want to get pregnant, it is important to make sure you always use very effective birth control, such as an intrauterine device, the birth control implant, female sterilization (having your tubes tied), or your partner having a vasectomy.      Reliable resources:   ?   American College of  "Nurse-Midwives (ACNM) http://www.midwife.org/; look at the informational handouts at http://www.midwife.org/Share-With-Women   ??   Women's Health.gov:   http://www.womenshealth.gov/a-z-topics/index.html   FDA - Nutrition ?www.mypyramid.gov? Under \"For Consumers,\" click on \"pregnant and breastfeeding women.\"   ?   Vaccines : Centers for Disease Control and Prevention (CDC) http://www.cdc.gov/vaccines/   ?   [facility] www.[place]Immigreat Now.OpenSpirit   ?   When researching information on the web, question the validity of websites.? The Hoverink .gov, Tax Alli andLeadhitorg tend to be more reliable information.? If there are a lot of advertisements, be cautious of the information provided. Stay away from blogs and chat rooms please!   ?   ?   Nutrition and supplements:   Daily multivitamin vitamin (with 400 - 1000 mcg folic acid).? Take with food as needed.   ?   4-5 servings of dairy or other calcium rich foods (fish, leafy greens, soy)?per day - if not, take 500-1000 mg additional calcium (Tums, pills, chews). Take with dairy.   ?   Vitamin D3 4000 IU geltab daily. Vitamin D rich foods: Cod Liver Oil (1Tbsp), [place], Mackerel, Tuna, fortified milk and yogurt, Beef and liver, sardines, egg, fortified cereals, swiss cheese.? Take supplements with fattiest meal.?   ?   2-3 (4) oz servings of fish, seafood, nuts (walnuts & almonds), oils, avocado per week - if not, take Omega 3 Fatty acids: DHA & COREY 8237-6881 mg per day. ?Other names: cod liver oil, fish oil. Take with fattiest meal.   ?   ?---------------------------------------------------------------------------------------------------------  POSTPARTUM & LACTATION RESOURCES :     Virtual Breastfeeding Support:     During this time of isolation, breastfeeding families need even more community!  Here are some area organizations offering virtual support groups for breastfeeding:     [facility] Cafe Support Group, Tuesdays at 10:30 am              Run by BRETT Khan of [employer] Baby [employer]isperer " "Lactation Consultants              Go to [employer] Baby Whis[employer] Lactation Consultants Facebook page and click on \"events\" for link              https://www.facebook.com/events/972295095108922/  [facility] Milk Hour,  at 2:30 pm               Run by BRETT Gillespie              Go to [facility] FB page and send message to get link              https://www.GoGarden.PushPage/healthfoundations/  [facility] holding virtual meetings the first Tuesday of each month, 8-9 pm, and the   Third Saturday, 10 - 11 am.  Go to [facility] and [place] FB page; message to get link [email or web address]/LLLofGoldJose Alejandro/?hc_location=[facility] offers a Lactation Lounge every Friday 12pm - 1pm, run by Tete Dawkins [facility] Leader              Sign up via link at Building Successful Teens/cbe-lactation              https://www.Building Successful Teens/cbe-lactation  [facility] is offering virtual support groups every Monday, 10:30 am - 12 pm, run by nurse IBCLC    Https://www.facebook.com/events/780684495204703/     Prenatal Breastfeeding Classes:       [employer] is offering virtual breastfeeding and  care classes:  https://www.Building Successful Teens/education-workshops  BirthEd childbirth and breastfeeding education offering virtual prenatal breastfeeding classes  https://www.birthedmn.com/workshops     Breastfeeding:   OUTPATIENT LACTATION RESOURCES   -Schedule an appointment with a [facility] who is also a Lactation Consultant by calling [phone]. Maria Guadalupe WEST, CNM at [facility] on Monday, Shruthi Howell CNM at [facility] on  and [facility] on .   [facility] Lactation Clinics located at [facility] and [facility]   Call: [phone].   Inpatient support   Outpatient appointments   Telephone consultation   Breast-feeding classes available through " [employer]      [facility]/WIC/[facility] health improvement partnership:                                [facility] Baby Café  Due to COVID-19, all Baby Café sessions are canceled until further notice. For lactation support, please contact one of our bilingual staff:  Mikayla (IBCLC) [phone]  Carmen (IBCLC/ Latvian) [phone]  Zogissel (Hmong) [phone]  Aiden (Zambian) [phone]  Baby Café is a free, drop-in service offering breastfeeding/chestfeeding support. Come share tips and socialize with other pregnant, breastfeeding/chestfeeding families. Babies and siblings are welcome (no  available).  We offer:  Professionally trained lactation staff.  Resource books for lending.  Relaxed and fun atmosphere.  Refreshments.  Locations  Baby Café is offered at several locations.  Please see below for the Baby Café closest to you.  CANCELED UNTIL FURTHER NOTICE  [facility]  [address] [address]  1st Wednesdays of the month   10 a.m. - Noon  CANCELED UNTIL FURTHER NOTICE  [facility] [address], [place], [address]  4th Wednesdays of the month   10 a.m. - 12:30 p.m.     CANCELED UNTIL FURTHER NOTICE  [facility]  [address], [place], [address]  4th Wednesdays of the month  4 - 6 p.m.  CANCELED UNTIL FURTHER NOTICE  [employer] [facility] [address], [place], [address]  2nd Thursdays of the month.  9:30-11:30 a.m.  Enter through the east end of the building, the blue Door C.  Ring the ECFE buzzer to be let in.   More information  Carmen Chaudhry  [phone]  [email or web address].      -[facility]:  www.[facility] HospitaleBioscience.Terma Software Labs   -Attend a baby weigh in at [facility]. Lactation consultants are available to answer questions   [facility]: Tuesdays 1:00 - 2:00   [facility]: Mondays 1:00 - 2:00   -Attend a New Mamma group or a Second Time Mama group at [place].      -Enlightened  "Mama: www.FindProzenedmama.TecMed   -Attend one of the New Mama groups at [facility] in [facility]. [facility] also offers one-on-one in home and in office lactation consults.      -Fabiola: www.beau.org/   -Attend a Beverly Alvarezague meeting. Multiple groups in several locations throughout the [facility]. The meetings are no-cost and always informative breastfeeding education session through [employer] La Leche League              Held at [facility] the second Thursday of each month at 7pm     - Information on medication use while breastfeeding: http://toxnet.nlm.nih.gov/newtoxnet/lactmed.htm      Other Online Breastfeeding Resources:   healtheast.org/baby sign up for free online weekly e-mail   healtheast.org/maternity   Breastfeedingmadesimple.com   Llli.org (La Leche League)   Normalfed.com   WomenKensing[facility] Hospitalth.gov/breastfeeding   Workandpump.com   [employer]  https://Searchspace/abcs/   Click on \"Learn More About Attachment\"     -New Parent Connection Drop-In:  In collaboration with [facility], Early Childhood Family Education (ECFE), a program of [address], offers ongoing classes for new parents in their infants.  The connection classes offer support and resources to parents during the exciting and challenging period of transition following the birth of her baby.  Parents and babies (birth to 6 months of age) may join the group at any time.  Baby's birth to 3 months meet Tuesdays, from 1230 to 1:30 PM  Babies 3-6 months meet Tuesdays, from 4 to 5 PM     -[facility] New Mama Group: www.Lumoid/free-new-mama-group  -Attend TonchidotPredictive Biosciencess Free New Mama. Groups located at three locations:  [facility] and [place]. Sign up online.         Additional Resources:?   -American College of Nurse-Midwives (ACNM) http://www.midwife.org/; look at the informational handouts at http://www.midwife.org/Share-With-Women  www.mymidwife.org   ?   -Women's Health.gov: "   http://www.womenshealth.gov/a-z-topics/index.html   ?   -Early Childhood and Family Education (ECFE):   ECFE offers parents hands-on learning experiences that will nourish a lifetime of teachable moments.   http://ecfe.info/ecfe-home/         -----------------------------------------------------------------------------------------------------------   (Before, during and after pregnancy)   MOOD DISORDER RESOURCES :  Are you feeling sad or depressed?   Do you feel more irritable or angry with those around you?   Are you having difficulty bonding with your baby?   Do you feel anxious or panicky?   Are you having problems with eating or sleeping?   Are you having upsetting thoughts that you can t get out of your mind?   Do you feel as if you are  out of control  or  going crazy ?   Do you feel like you never should have become a mother?   Are you worried that you might hurt your baby or yourself?     Any of these symptoms, and many more, could indicate that you have a form of  mood or anxiety disorder, such as postpartum depression. While many women experience some mild mood changes during or after the birth of a child, 15 to 20% of women experience more significant symptoms of depression or anxiety. Please know that with informed care you can prevent a worsening of these symptoms and can fully recover. There is no reason to continue to suffer.  Women of every culture, age, income level and race can develop  mood and anxiety disorders. Symptoms can appear any time during pregnancy and the first 12 months after childbirth. There are effective and well-researched treatment options to help you recover. Although the term  postpartum depression  is most often used, there are actually several forms of illness that women may experience.     Resources:     -Pregnancy and Postpartum Support [place]: www.[facility] Centerupportmn.org  Who We Are: We are a group of mental health &  practitioners, service  "organizations, and mother volunteers who provides services to those struggling with a pregnancy, loss, or postpartum mood disorder through the Helpline, professional training, our resource list and website.  What We Do: We provide support, advocacy, awareness, and training about  mental health in [place].      Community Resource List:   This is a list of  resources within our community.   http://ppsupportmn.org/communityresourcelist    PSYCHOTHERAPISTS/CONSULTANTS   This is a list of licensed mental health professionals who have advanced clinical skills in the treatment of postpartum mood and anxiety disorders (PMADs).   Http://Appdraupportmn.org/mentalhealthproviderresourcelist   INTEGRATIVE MEDICINE PRACTITIONERS:   This is a list of licensed providers who practice Integrative Medicine: a form of treatment that combines alternative medicine with evidence based medicine in an effort to treat the  whole person  (the person, not just the disease).   http://Appdraupportmn.org/integrativemedicineproviders    PSYCHIATRIC CARE   This is a list of licensed Psychiatrists who have advanced clinical skills in the treatment and medication management for PMADs and lactating mothers.   Http://Appdraupportmn.org/psychiatryproviderresroucelist   Click on the links below to find detailed profiles and contact information of providers who have been screened and approved as having advanced training in the area of PMADs. Please note: [facility] does not endorse a specific provider.   The list is in alphabetical order by city. You can also search for providers by city or zip code using the search box. Please click on the \"Show\" box to the upper right to advance to the next page. You may also call our Helpline at [phone] if you would like for our Helpline volunteer to find a provider for you.     -Postpartum Depression Support Group:   Weekly groups at no cost through [facility], , " 1:30-3:00 p.m., at [facility], [address] [facility], [address]. [place], , 1:30-3:00 p.m., at [facility], [address]. To register for the group or get more information, call [phone].   -Postpartum Depression Support Group:   Outpatient Intensive Treatment program for women with  mood disorders [facility] Mother/Baby Program     -[facility]  Resource Guide      Women s Mental Health at [facility]  This website provides a range of current information including discussion of new research findings in women s mental health and how such investigations inform day-to-day clinical practice. Despite the growing number of studies being conducted in women s health, the clinical implications of such work are frequently controversial, leaving patients with questions regarding the most appropriate path to follow. Providing these resources to patients and their doctors so that individual clinical decisions can be made in a thoughtful and collaborative fashion dovetails with the mission of our Center.     https://womensmentalhealth.org/        If you re having thoughts of harming yourself or your baby, it is vital to get support immediately. Call 911 or go to the nearest E.R.     TOLL-FREE / NATIONWIDE EMERGENCY ASSISTANCE   Immediate Emergency:  911   National Suicide Prevention Lifeline:   1-901.901.5023   Suicide Prevention Hotline:   2-590-JEIWQCT   National Postpartum Depression Hotline:   -PPD-MOMS   Postpartum Support International (PSI)   PPD Helpline: (not a 24-hour hotline)   1-472.210.2007      yes

## 2024-06-20 ENCOUNTER — OFFICE VISIT (OUTPATIENT)
Dept: MIDWIFE SERVICES | Facility: CLINIC | Age: 34
End: 2024-06-20
Payer: COMMERCIAL

## 2024-06-20 VITALS
BODY MASS INDEX: 25.77 KG/M2 | SYSTOLIC BLOOD PRESSURE: 110 MMHG | DIASTOLIC BLOOD PRESSURE: 62 MMHG | WEIGHT: 174 LBS | OXYGEN SATURATION: 99 % | HEIGHT: 69 IN | HEART RATE: 78 BPM

## 2024-06-20 DIAGNOSIS — Z30.8 ENCOUNTER FOR OTHER CONTRACEPTIVE MANAGEMENT: ICD-10-CM

## 2024-06-20 DIAGNOSIS — Z00.00 HEALTHCARE MAINTENANCE: ICD-10-CM

## 2024-06-20 DIAGNOSIS — K42.9 UMBILICAL HERNIA WITHOUT OBSTRUCTION AND WITHOUT GANGRENE: Primary | ICD-10-CM

## 2024-06-20 PROBLEM — Z67.91 RH NEGATIVE STATE IN ANTEPARTUM PERIOD: Status: RESOLVED | Noted: 2022-01-15 | Resolved: 2024-06-20

## 2024-06-20 PROBLEM — Z13.71 SCREENING FOR GENETIC DISEASE CARRIER STATUS: Status: RESOLVED | Noted: 2022-01-21 | Resolved: 2024-06-20

## 2024-06-20 PROBLEM — I49.3 PVC'S (PREMATURE VENTRICULAR CONTRACTIONS): Status: RESOLVED | Noted: 2022-03-25 | Resolved: 2024-06-20

## 2024-06-20 PROBLEM — O26.899 RH NEGATIVE STATE IN ANTEPARTUM PERIOD: Status: RESOLVED | Noted: 2022-01-15 | Resolved: 2024-06-20

## 2024-06-20 PROBLEM — Z34.80 SUPERVISION OF OTHER NORMAL PREGNANCY: Status: RESOLVED | Noted: 2022-02-26 | Resolved: 2024-06-20

## 2024-06-20 PROCEDURE — 99213 OFFICE O/P EST LOW 20 MIN: CPT | Performed by: ADVANCED PRACTICE MIDWIFE

## 2024-06-20 NOTE — PROGRESS NOTES
Assessment:   1.  Contraceptive management-status post bilateral tubal ligation  2.  Lactating mother, desires to wean  3.  Umbilical hernia  4.  Healthcare maintenance    Plan:      1. Discussed nutrition and exercise.  Advised MVI, Vitamin D3 4000IU geltab and an omega 3 supplement daily   2. Blood tests: Plan to RTC FASTING for blood work.  3. Breast self exam technique reviewed and patient encouraged to perform self-exam monthly.   4. Contraception: Status post bilateral tubal ligation  5.  Recommend breast binding, ibuprofen for pain, and expressing to comfort, Sudafed as directed.  6.  Referred to general surgery for evaluation of umbilical hernia.  7.  RTC for annual physical exam and FASTING labs: Lipids, hemoglobin A1c, CBC, CMP and TSH with reflex free T4, PRN    Subjective:   Margarita Roper is a 34 year old female who presents for a problem visit.  She requests a referral for umbilical hernia.  Family is complete.  Status post bilateral tubal ligation.  Breast-feeding toddler.  Desires to discontinue.        Immunization History   Administered Date(s) Administered    DTAP (<7y) 1988, 1988, 1988, 06/12/1990    HPV Quadrivalent 04/13/2007, 07/11/2007, 01/08/2009    Historical DTP/aP 1988, 1988, 1988, 06/12/1990    Influenza (IIV3) PF 09/21/2010    Influenza, seasonal, injectable, PF 09/21/2010    MMR 08/21/1989    Meningococcal ACWY (Menactra ) 03/31/2006    Meningococcal ACWY (Menveo ) 03/31/2006    Meningococcal,unspecified 03/31/2006    Polio, Unspecified 1988, 1988, 06/12/1990    Poliovirus, inactivated (IPV) 1988, 1988, 06/12/1990, 09/29/2011    TDAP Vaccine (Adacel) 09/21/2010    Twinrix A/B 09/21/2010, 09/29/2011    Typhoid IM 09/21/2010       Gynecologic History  Patient's last menstrual period was 06/15/2024 (exact date).  Contraception: tubal ligation    Cancer screening:   Last Pap: August 2022. Results were: Normal, negative  HPV    OB History    Para Term  AB Living   2 2 2 0 0 2   SAB IAB Ectopic Multiple Live Births   0 0 0 0 2      # Outcome Date GA Lbr Nasim/2nd Weight Sex Type Anes PTL Lv   2 Term 22 39w6d 03:05 / 02:36 3.6 kg (7 lb 15 oz) M Vag-Spont EPI N RUSH      Complications: Prolonged second stage      Name: Hemanth      Apgar1: 8  Apgar5: 9   1 Term 16 40w1d  3.572 kg (7 lb 14 oz) M Vag-Spont EPI  RUSH      Birth Comments: Long prodromal labor, spontaneous active labor, pushed x 1 hr, tear with repair, BFx 18 months      Name: Thomas      Apgar1: 9  Apgar5: 9       Current Outpatient Medications   Medication Sig Dispense Refill    Fenugreek 500 MG CAPS  (Patient not taking: Reported on 2022)      Prenatal Vit-Fe Fumarate-FA (PRENATAL MULTIVITAMIN W/IRON) 27-0.8 MG tablet Take 1 tablet by mouth daily (Patient not taking: Reported on 2024)      vitamin B complex with vitamin C (STRESS TAB) tablet Take 1 tablet by mouth daily  (Patient not taking: Reported on 2024)      VITAMIN D, CHOLECALCIFEROL, PO Take by mouth daily  (Patient not taking: Reported on 2024)      zinc gluconate 50 MG tablet Take 50 mg by mouth daily (Patient not taking: Reported on 2024)       Past Medical History:   Diagnosis Date    Abnormal Pap smear of cervix     Allergic rhinitis     Cervical high risk HPV (human papillomavirus) test positive 2010    Depression     Depressive disorder     History of kidney stones     HPV (human papilloma virus) infection     Intermittent asthma     PVC's (premature ventricular contractions)      Past Surgical History:   Procedure Laterality Date    COLPOSCOPY      early 20's after an abnormal pap smear    EXTRACTION(S) DENTAL      LAPAROTOMY MINI, TUBAL LIGATION (POST PARTUM), COMBINED Bilateral 2022    Procedure: LIGATION, FALLOPIAN TUBE, POSTPARTUM BILATERAL;  Surgeon: Gen Duenas MD;  Location: [facility] Main OR    ORTHOPEDIC SURGERY      WISDOM TOOTH  EXTRACTION       Penicillins, Ampicillin sodium, Other [no clinical screening - see comments], and Lactalbumin  Family History   Problem Relation Age of Onset    Cancer Mother 63        Salivary gland    Hypertension Father     Heart Disease Brother     No Known Problems Maternal Grandmother     Unknown/Adopted Maternal Grandfather     Kidney Disease Maternal Grandfather     Cerebrovascular Disease Paternal Grandmother     C.A.D. Paternal Grandfather         MI    Heart Disease Paternal Grandfather     No Known Problems Son     No Known Problems Maternal Aunt     No Known Problems Maternal Uncle     No Known Problems Paternal Aunt     No Known Problems Paternal Uncle     Diabetes No family hx of     Breast Cancer No family hx of     Cancer - colorectal No family hx of      Social History     Socioeconomic History    Marital status:      Spouse name: Yasmany Morejon    Number of children: 2    Years of education: Not on file    Highest education level: Master's degree (e.g., MA, MS, Kathleen, MEd, MSW, LARISA)   Occupational History    Occupation: Bank Teller     Employer: [employer]   Tobacco Use    Smoking status: Former     Current packs/day: 0.30     Average packs/day: 0.3 packs/day for 6.0 years (1.8 ttl pk-yrs)     Types: Cigarettes    Smokeless tobacco: Never    Tobacco comments:     1-2 cigarettes per week   Vaping Use    Vaping status: Never Used   Substance and Sexual Activity    Alcohol use: Not Currently     Comment: social    Drug use: No    Sexual activity: Yes     Partners: Male     Birth control/protection: Surgical   Other Topics Concern    Parent/sibling w/ CABG, MI or angioplasty before 65F 55M? No   Social History Narrative    Not on file     Social Determinants of Health     Financial Resource Strain: Not on file   Food Insecurity: Not on file   Transportation Needs: Not on file   Physical Activity: Not on file   Stress: Not on file   Social Connections: Not on file   Interpersonal Safety: Low  "Risk  (6/20/2024)    Interpersonal Safety     Do you feel physically and emotionally safe where you currently live?: Yes     Within the past 12 months, have you been hit, slapped, kicked or otherwise physically hurt by someone?: No     Within the past 12 months, have you been humiliated or emotionally abused in other ways by your partner or ex-partner?: No   Housing Stability: Not on file       Review of Systems  General:  Denies problem  Eyes: Denies problem  Ears/Nose/Throat: Denies problem  Cardiovascular: Denies problem  Respiratory:  Denies problem  Gastrointestinal:   Please see subjective  Genitourinary: denies problems  Musculoskeletal:  Denies problem  Skin: Denies problem  Neurologic:denies problems  Psychiatric: denies problems  Endocrine: denies problems        Objective:        Vitals:    06/20/24 1019   BP: 110/62   Pulse: 78   SpO2: 99%   Weight: 78.9 kg (174 lb)   Height: 1.74 m (5' 8.5\")       Physical Exam:  General Appearance: Alert, cooperative, no distress, appears stated age  Skin: Skin color, texture, turgor normal, no rashes or lesions  HEENT: grossly normal; otoscopic and opthalmic exam not performed.   Lungs: respirations unlabored  Abdomen: Visible umbilical hernia  Pelvic: Deferred  "

## 2024-06-23 ENCOUNTER — HEALTH MAINTENANCE LETTER (OUTPATIENT)
Age: 34
End: 2024-06-23

## 2024-06-27 NOTE — TELEPHONE ENCOUNTER
REFERRAL INFORMATION:  Referring Provider: Ning Salas CNM  Referring Clinic: [facility]  Reason for Visit/Diagnosis: Umbilical Hernia without obstruction and without gangrene        FUTURE VISIT INFORMATION:  Appointment Date: 7/11/2024  Appointment Time: 2 PM     NOTES RECORD STATUS  DETAILS   OFFICE NOTE from Referring Provider Internal [facility]:  6/20/24 - MIDWIFE OV with iNng Salas CNM   OFFICE NOTE from Other Specialists Internal [facility]:  6/23/22 - OBGYN OV with Dr. Godinez   HOSPITAL DISCHARGE SUMMARY/ ED VISITS  N/A    OPERATIVE REPORT Internal [facility]:  7/13/22 - OP Note for LIGATION, FALLOPIAN TUBE, POSTPARTUM BILATERAL with Dr. Duenas   ENDOSCOPY (EGD)  N/A    PERTINENT LABS Internal

## 2024-07-11 ENCOUNTER — OFFICE VISIT (OUTPATIENT)
Dept: SURGERY | Facility: CLINIC | Age: 34
End: 2024-07-11
Attending: ADVANCED PRACTICE MIDWIFE
Payer: COMMERCIAL

## 2024-07-11 ENCOUNTER — PRE VISIT (OUTPATIENT)
Dept: SURGERY | Facility: CLINIC | Age: 34
End: 2024-07-11

## 2024-07-11 VITALS
WEIGHT: 176.5 LBS | DIASTOLIC BLOOD PRESSURE: 70 MMHG | HEART RATE: 76 BPM | HEIGHT: 69 IN | SYSTOLIC BLOOD PRESSURE: 108 MMHG | OXYGEN SATURATION: 99 % | BODY MASS INDEX: 26.14 KG/M2

## 2024-07-11 DIAGNOSIS — K42.9 UMBILICAL HERNIA WITHOUT OBSTRUCTION AND WITHOUT GANGRENE: ICD-10-CM

## 2024-07-11 PROCEDURE — 99203 OFFICE O/P NEW LOW 30 MIN: CPT | Performed by: SURGERY

## 2024-07-11 ASSESSMENT — PAIN SCALES - GENERAL: PAINLEVEL: NO PAIN (0)

## 2024-07-11 NOTE — NURSING NOTE
"Chief Complaint   Patient presents with    New Patient     Umbilical hernia       Vitals:    07/11/24 0657   BP: 108/70   BP Location: Left arm   Patient Position: Sitting   Cuff Size: Adult Regular   Pulse: 76   SpO2: 99%   Weight: 80.1 kg (176 lb 8 oz)   Height: 1.74 m (5' 8.5\")       Body mass index is 26.45 kg/m .                          Rodriguez Sears EMT    "

## 2024-07-11 NOTE — PROGRESS NOTES
New General Surgery Consultation Note        Margarita Roper  [id number]  1990 July 11, 2024     Requesting Provider: Ning Salas        I had the pleasure of seeing your patient, Margarita Roper.  She is a 34 year old female who is being seen in consultation for the following concern(s).     CHIEF COMPLAINT:       No data to display                HISTORY OF PRESENT ILLNESS:  Margarita is a very nice young woman with lump at umbilicus.    Has had this for at least 8 yrs.    Had bilateral tubal ligation 2 yrs ago, but the hernia was present prior to that.    The hernia doesn't bother her very much, but she is wondering if it should be fixed.    Generally softens up with lying down.    No obstructions of intestine nor hospitalizations or ED visits for this.    ROS    PAST MEDICAL HISTORY:  Past Medical History:   Diagnosis Date    Abnormal Pap smear of cervix     Allergic rhinitis     Cervical high risk HPV (human papillomavirus) test positive 05/18/2010    Depression     Depressive disorder     History of kidney stones     HPV (human papilloma virus) infection     Intermittent asthma     PVC's (premature ventricular contractions)         PAST SURGICAL HISTORY:  Past Surgical History:   Procedure Laterality Date    COLPOSCOPY      early 20's after an abnormal pap smear    EXTRACTION(S) DENTAL      LAPAROTOMY MINI, TUBAL LIGATION (POST PARTUM), COMBINED Bilateral 7/13/2022    Procedure: LIGATION, FALLOPIAN TUBE, POSTPARTUM BILATERAL;  Surgeon: Gen Duenas MD;  Location: [facility] OR    ORTHOPEDIC SURGERY      WISDOM TOOTH EXTRACTION          MEDICATIONS:  No current outpatient medications on file.     No current facility-administered medications for this visit.        ALLERGIES:  Allergies   Allergen Reactions    Penicillins Rash and Hives    Ampicillin Sodium     Other Drug Allergy (See Comments)      Takes sublingual inhalant antigen for allergies    Other [No Clinical Screening -  See Comments]      Allergic to corn    Lactalbumin Other (See Comments)     Bloated, constipated        SOCIAL HISTORY:  Social History     Socioeconomic History    Marital status:      Spouse name: Yasmany Morejon    Number of children: 2    Years of education: None    Highest education level: Master's degree (e.g., MA, MS, Kathleen, MEd, MSW, LARISA)   Occupational History    Occupation: Bank Teller     Employer: [employer]   Tobacco Use    Smoking status: Former     Current packs/day: 0.30     Average packs/day: 0.3 packs/day for 6.0 years (1.8 ttl pk-yrs)     Types: Cigarettes    Smokeless tobacco: Never    Tobacco comments:     1-2 cigarettes per week   Vaping Use    Vaping status: Never Used   Substance and Sexual Activity    Alcohol use: Not Currently     Comment: social    Drug use: No    Sexual activity: Yes     Partners: Male     Birth control/protection: Surgical   Other Topics Concern    Parent/sibling w/ CABG, MI or angioplasty before 65F 55M? No     Social Determinants of Health     Interpersonal Safety: Low Risk  (6/20/2024)    Interpersonal Safety     Do you feel physically and emotionally safe where you currently live?: Yes     Within the past 12 months, have you been hit, slapped, kicked or otherwise physically hurt by someone?: No     Within the past 12 months, have you been humiliated or emotionally abused in other ways by your partner or ex-partner?: No       FAMILY HISTORY:  Family History   Problem Relation Age of Onset    Cancer Mother 63        Salivary gland    Hypertension Father     Heart Disease Brother     No Known Problems Maternal Grandmother     Unknown/Adopted Maternal Grandfather     Kidney Disease Maternal Grandfather     Cerebrovascular Disease Paternal Grandmother     C.A.D. Paternal Grandfather         MI    Heart Disease Paternal Grandfather     No Known Problems Son     No Known Problems Maternal Aunt     No Known Problems Maternal Uncle     No Known Problems Paternal Aunt  "    No Known Problems Paternal Uncle     Diabetes No family hx of     Breast Cancer No family hx of     Cancer - colorectal No family hx of         PHYSICAL EXAM:  Vital Signs: /70 (BP Location: Left arm, Patient Position: Sitting, Cuff Size: Adult Regular)   Pulse 76   Ht 1.74 m (5' 8.5\")   Wt 80.1 kg (176 lb 8 oz)   LMP 06/15/2024 (Exact Date)   SpO2 99%   BMI 26.45 kg/m    HEENT: NCAT; MMM;   Lungs: Breathing unlabored  Abdomen: there is small button-like umbilical hernia, reducible.  Probably fat-plugged.    Incision just below umbilicus, likely represents just bilateral tubal ligation.     PHYSICAL EXAM AREA OF INTEREST:  Umbilicus.       PERTINENT IMAGING/TESTING:  None relevant  LABORATORY TESTING:  N/a    ASSESSMENT:   Margarita Roper is a 34 year old female with small umbilical hernia and associated fascial opening at infraumbilical location.  Wants hernia repair.         DISCUSSION OF RISKS:  The risks of hernia repair are as follows (note this is a comprehensive risk and includes risks from very large hernias).    These risks combine the risks of abdominal surgery and the risks of hernia repair, including mesh implantation, and were described to the patient as follows:    Abdominal surgery risks:    These include, but are not limited to, death, myocardial infarction, pneumonia, urinary tract infection, deep venous thrombosis with or without pulmonary embolus, abdominal infection from bowel injury or abscess, bowel obstruction, wound infection, and bleeding.    Hernia repair risks:    Food and Drug Administration Comments on Hernia Repair Surgery and Mesh Implantation.    http://www.fda.gov/MedicalDevices/ProductsandMedicalProcedures/ImplantsandProsthetics/HerniaSurgicalMesh/default.htm      Hernia Repair Complications    Based on FDA s analysis of medical device adverse event reports and of peer-reviewed, scientific literature, the most common adverse events for all surgical repair of " hernias--with or without mesh--are pain, infection, hernia recurrence, scar-like tissue that sticks tissues together (adhesion), blockage of the large or small intestine (obstruction), bleeding, abnormal connection between organs, vessels, or intestines (fistula), fluid build-up at the surgical site (seroma), and a hole in neighboring tissues or organs (perforation).    The most common adverse events following hernia repair with mesh are pain, infection, hernia recurrence, adhesion, and bowel obstruction. Some other potential adverse events that can occur following hernia repair with mesh are mesh migration and mesh shrinkage (contraction).    Many complications related to hernia repair with surgical mesh that have been reported to the FDA have been associated with recalled mesh products that are no longer on the market. Pain, infection, recurrence, adhesion, obstruction, and perforation are the most common complications associated with recalled mesh. In the FDA s analysis of medical adverse event reports to the FDA, recalled mesh products were the main cause of bowel perforation and obstruction complications.    Please refer to the recall notices here and here for more information if you have recalled mesh. For more information on the recalled products, please visit the FDA Medical Device Recall website. Please visit the Medical & Radiation Emitting Device Database to search a specific type of surgical mesh.    If you are unsure about the specific mesh  and brand used in your surgery and have questions about your hernia repair, contact your surgeon or the facility where your surgery was performed to obtain the information from your medical record.         PLAN:     Dr. Jared Egan MD  Tier: n/a    Is this a multi-surgeon case?  No  Urgency of surgery:  Patient choice/next available  [facility]- 5th Floor, [address] [place] [address]  Can this case be scheduled on the [facility]?  No  Surgeon procedure time:  60 minutes  Length of stay:  Same Day Surgery  Procedure:  Laparoscopic umbilical hernia repair  Laterality:  N/A  Type of anesthesia:  General  H&P to be completed by:  PCP  PAC- Virtual  PAC- In Person   needed:  No  Post op appointment:  protocol    Additional Comments:   would like photograph of liver.  No robot.    Will do laparoscopic look to rule out additional hernias; perhaps just a cut-down at the primary site and stitch or symbotex 9cm [place] mesh.        The patient was told that our Surgery Scheduler would reach out to them in 1-2 business days.    No orders of the defined types were placed in this encounter.      Sincerely,     MD Henrique Oliveros Ronald Frederick, MD   Physician  OB/Gyn     Op Note     Signed     Date of Service: 7/13/2022  9:27 AM  Creation Time: 7/13/2022  9:41 AM  Case Time: Procedures: Surgeons:   7/13/2022  9:27 AM LIGATION, FALLOPIAN TUBE, POSTPARTUM BILATERAL    Gen Duenas MD                 Show:Clear all  [x]Written[x]Templated[]Copied    Added by:  [x]Gen Duenas MD    []Jeanette for details  .PROCEDURE: Postpartum Tubal Ligation     DATE OF SERVICE: 7/13/2022     PREOPERATIVE DIAGNOSIS: Multiparity, desires sterilization.     POSTOPERATIVE DIAGNOSIS: Multiparity, desires sterilization.     PROCEDURE NOTE: Postpartum Tubal Ligation     SURGEON(S): Gen Duenas MD     ANESTHESIA: General     ESTIMATED BLOOD LOSS: minimal     SPECIMENS: portions of each tube     COMPLICATIONS: None.     HISTORY   This is a multiparous female who desires permanent sterilization. The risks, benefits, and alternatives were discussed including a failure rate of 1/200, irreversibility and increased risk of an ectopic with tubal ligation. She expressed understanding and wished to proceed.      DESCRIPTION OF PROCEDURE   The patient was brought to the operating room and after induction of  anesthesia was prepped and draped in the supine position.  A time out was called and the patient and the procedure were verified.  A small incision was made periumbilically.  This was carried down to the fascia, which was divided bilaterally.  The peritoneum was entered sharply.  The left fallopian tube was brought up through the incision site and traced out to its fimbrial end.  A knuckle of the tube was double ligated with plain gut and resected.  Hemostasis was observed.  The same procedure was carried out on the contralateral side.  Once this was done, 0-vicryl was used to close the fascia in a running manner and monocryl was used to close the skin in a subcuticular manner. Sponge and instrument counts were correct.  The patient tolerated the procedure well and was taken to the recovery room in good condition.         Gen Duenas MD     CC1:

## 2024-07-11 NOTE — LETTER
7/11/2024       RE: Margarita Roper  [address]  [facility] [place] [address]     Dear Colleague,    Thank you for referring your patient, Margarita Roper, to the [facility]. Please see a copy of my visit note below.        New General Surgery Consultation Note        Margarita Roper  [id number]  1990 July 11, 2024     Requesting Provider: Ning Salas        I had the pleasure of seeing your patient, Margarita Roper.  She is a 34 year old female who is being seen in consultation for the following concern(s).     CHIEF COMPLAINT:       No data to display                HISTORY OF PRESENT ILLNESS:  Margarita is a very nice young woman with lump at umbilicus.    Has had this for at least 8 yrs.    Had bilateral tubal ligation 2 yrs ago, but the hernia was present prior to that.    The hernia doesn't bother her very much, but she is wondering if it should be fixed.    Generally softens up with lying down.    No obstructions of intestine nor hospitalizations or ED visits for this.    ROS    PAST MEDICAL HISTORY:  Past Medical History:   Diagnosis Date     Abnormal Pap smear of cervix      Allergic rhinitis      Cervical high risk HPV (human papillomavirus) test positive 05/18/2010     Depression      Depressive disorder      History of kidney stones      HPV (human papilloma virus) infection      Intermittent asthma      PVC's (premature ventricular contractions)         PAST SURGICAL HISTORY:  Past Surgical History:   Procedure Laterality Date     COLPOSCOPY      early 20's after an abnormal pap smear     EXTRACTION(S) DENTAL       LAPAROTOMY MINI, TUBAL LIGATION (POST PARTUM), COMBINED Bilateral 7/13/2022    Procedure: LIGATION, FALLOPIAN TUBE, POSTPARTUM BILATERAL;  Surgeon: Gen Duenas MD;  Location: [facility] OR     ORTHOPEDIC SURGERY       WISDOM TOOTH EXTRACTION           MEDICATIONS:  No current outpatient medications on file.     No current facility-administered medications for this visit.        ALLERGIES:  Allergies   Allergen Reactions     Penicillins Rash and Hives     Ampicillin Sodium      Other Drug Allergy (See Comments)      Takes sublingual inhalant antigen for allergies     Other [No Clinical Screening - See Comments]      Allergic to corn     Lactalbumin Other (See Comments)     Bloated, constipated        SOCIAL HISTORY:  Social History     Socioeconomic History     Marital status:      Spouse name: Yasmany Morejon     Number of children: 2     Years of education: None     Highest education level: Master's degree (e.g., MA, MS, Kathleen, MEd, MSW, LARISA)   Occupational History     Occupation: Bank Teller     Employer: [employer]   Tobacco Use     Smoking status: Former     Current packs/day: 0.30     Average packs/day: 0.3 packs/day for 6.0 years (1.8 ttl pk-yrs)     Types: Cigarettes     Smokeless tobacco: Never     Tobacco comments:     1-2 cigarettes per week   Vaping Use     Vaping status: Never Used   Substance and Sexual Activity     Alcohol use: Not Currently     Comment: social     Drug use: No     Sexual activity: Yes     Partners: Male     Birth control/protection: Surgical   Other Topics Concern     Parent/sibling w/ CABG, MI or angioplasty before 65F 55M? No     Social Determinants of Health     Interpersonal Safety: Low Risk  (6/20/2024)    Interpersonal Safety      Do you feel physically and emotionally safe where you currently live?: Yes      Within the past 12 months, have you been hit, slapped, kicked or otherwise physically hurt by someone?: No      Within the past 12 months, have you been humiliated or emotionally abused in other ways by your partner or ex-partner?: No       FAMILY HISTORY:  Family History   Problem Relation Age of Onset     Cancer Mother 63        Salivary gland     Hypertension Father      Heart Disease Brother      No Known  "Problems Maternal Grandmother      Unknown/Adopted Maternal Grandfather      Kidney Disease Maternal Grandfather      Cerebrovascular Disease Paternal Grandmother      C.A.D. Paternal Grandfather         MI     Heart Disease Paternal Grandfather      No Known Problems Son      No Known Problems Maternal Aunt      No Known Problems Maternal Uncle      No Known Problems Paternal Aunt      No Known Problems Paternal Uncle      Diabetes No family hx of      Breast Cancer No family hx of      Cancer - colorectal No family hx of         PHYSICAL EXAM:  Vital Signs: /70 (BP Location: Left arm, Patient Position: Sitting, Cuff Size: Adult Regular)   Pulse 76   Ht 1.74 m (5' 8.5\")   Wt 80.1 kg (176 lb 8 oz)   LMP 06/15/2024 (Exact Date)   SpO2 99%   BMI 26.45 kg/m    HEENT: NCAT; MMM;   Lungs: Breathing unlabored  Abdomen: there is small button-like umbilical hernia, reducible.  Probably fat-plugged.    Incision just below umbilicus, likely represents just bilateral tubal ligation.     PHYSICAL EXAM AREA OF INTEREST:  Umbilicus.       PERTINENT IMAGING/TESTING:  None relevant  LABORATORY TESTING:  N/a    ASSESSMENT:   Margarita Roper is a 34 year old female with small umbilical hernia and associated fascial opening at infraumbilical location.  Wants hernia repair.         DISCUSSION OF RISKS:  The risks of hernia repair are as follows (note this is a comprehensive risk and includes risks from very large hernias).    These risks combine the risks of abdominal surgery and the risks of hernia repair, including mesh implantation, and were described to the patient as follows:    Abdominal surgery risks:    These include, but are not limited to, death, myocardial infarction, pneumonia, urinary tract infection, deep venous thrombosis with or without pulmonary embolus, abdominal infection from bowel injury or abscess, bowel obstruction, wound infection, and bleeding.    Hernia repair risks:    Food and Drug " Administration Comments on Hernia Repair Surgery and Mesh Implantation.    http://www.fda.gov/MedicalDevices/ProductsandMedicalProcedures/ImplantsandProsthetics/HerniaSurgicalMesh/default.htm      Hernia Repair Complications    Based on FDA s analysis of medical device adverse event reports and of peer-reviewed, scientific literature, the most common adverse events for all surgical repair of hernias--with or without mesh--are pain, infection, hernia recurrence, scar-like tissue that sticks tissues together (adhesion), blockage of the large or small intestine (obstruction), bleeding, abnormal connection between organs, vessels, or intestines (fistula), fluid build-up at the surgical site (seroma), and a hole in neighboring tissues or organs (perforation).    The most common adverse events following hernia repair with mesh are pain, infection, hernia recurrence, adhesion, and bowel obstruction. Some other potential adverse events that can occur following hernia repair with mesh are mesh migration and mesh shrinkage (contraction).    Many complications related to hernia repair with surgical mesh that have been reported to the FDA have been associated with recalled mesh products that are no longer on the market. Pain, infection, recurrence, adhesion, obstruction, and perforation are the most common complications associated with recalled mesh. In the FDA s analysis of medical adverse event reports to the FDA, recalled mesh products were the main cause of bowel perforation and obstruction complications.    Please refer to the recall notices here and here for more information if you have recalled mesh. For more information on the recalled products, please visit the FDA Medical Device Recall website. Please visit the Medical & Radiation Emitting Device Database to search a specific type of surgical mesh.    If you are unsure about the specific mesh  and brand used in your surgery and have questions about your  hernia repair, contact your surgeon or the facility where your surgery was performed to obtain the information from your medical record.         PLAN:     Dr. Jared Egan MD  Tier: n/a    Is this a multi-surgeon case?  No  Urgency of surgery:  Patient choice/next available  [facility]- 5th Floor, [address]  Can this case be scheduled on the [facility]?  No  Surgeon procedure time:  60 minutes  Length of stay:  Same Day Surgery  Procedure:  Laparoscopic umbilical hernia repair  Laterality:  N/A  Type of anesthesia:  General  H&P to be completed by:  PCP  PAC- Virtual  PAC- In Person   needed:  No  Post op appointment:  protocol    Additional Comments:   would like photograph of liver.  No robot.    Will do laparoscopic look to rule out additional hernias; perhaps just a cut-down at the primary site and stitch or symbotex 9cm [place] mesh.        The patient was told that our Surgery Scheduler would reach out to them in 1-2 business days.    No orders of the defined types were placed in this encounter.      Sincerely,     MD Henrique Oliveros Ronald Frederick, MD   Physician  OB/Gyn     Op Note     Signed     Date of Service: 7/13/2022  9:27 AM  Creation Time: 7/13/2022  9:41 AM  Case Time: Procedures: Surgeons:   7/13/2022  9:27 AM LIGATION, FALLOPIAN TUBE, POSTPARTUM BILATERAL    Gen Duenas MD                 Show:Clear all  [x]Written[x]Templated[]Copied    Added by:  [x]Gen Duenas MD    []Ryanver for details  .PROCEDURE: Postpartum Tubal Ligation     DATE OF SERVICE: 7/13/2022     PREOPERATIVE DIAGNOSIS: Multiparity, desires sterilization.     POSTOPERATIVE DIAGNOSIS: Multiparity, desires sterilization.     PROCEDURE NOTE: Postpartum Tubal Ligation     SURGEON(S): Gen Duenas MD     ANESTHESIA: General     ESTIMATED BLOOD LOSS: minimal     SPECIMENS: portions of each tube     COMPLICATIONS:  None.     HISTORY   This is a multiparous female who desires permanent sterilization. The risks, benefits, and alternatives were discussed including a failure rate of 1/200, irreversibility and increased risk of an ectopic with tubal ligation. She expressed understanding and wished to proceed.      DESCRIPTION OF PROCEDURE   The patient was brought to the operating room and after induction of anesthesia was prepped and draped in the supine position.  A time out was called and the patient and the procedure were verified.  A small incision was made periumbilically.  This was carried down to the fascia, which was divided bilaterally.  The peritoneum was entered sharply.  The left fallopian tube was brought up through the incision site and traced out to its fimbrial end.  A knuckle of the tube was double ligated with plain gut and resected.  Hemostasis was observed.  The same procedure was carried out on the contralateral side.  Once this was done, 0-vicryl was used to close the fascia in a running manner and monocryl was used to close the skin in a subcuticular manner. Sponge and instrument counts were correct.  The patient tolerated the procedure well and was taken to the recovery room in good condition.         Gen Duenas MD     CC1:            Again, thank you for allowing me to participate in the care of your patient.      Sincerely,    Jared Egan MD

## 2024-07-12 DIAGNOSIS — K42.9 UMBILICAL HERNIA WITHOUT OBSTRUCTION AND WITHOUT GANGRENE: Primary | ICD-10-CM

## 2024-07-12 RX ORDER — CEFAZOLIN SODIUM 2 G/50ML
2 SOLUTION INTRAVENOUS SEE ADMIN INSTRUCTIONS
Status: CANCELLED | OUTPATIENT
Start: 2024-07-12

## 2024-07-12 RX ORDER — CEFAZOLIN SODIUM 2 G/50ML
2 SOLUTION INTRAVENOUS
Status: CANCELLED | OUTPATIENT
Start: 2024-07-12

## 2024-07-17 ENCOUNTER — HOSPITAL ENCOUNTER (OUTPATIENT)
Facility: AMBULATORY SURGERY CENTER | Age: 34
End: 2024-07-17
Attending: SURGERY
Payer: COMMERCIAL

## 2024-07-17 ENCOUNTER — TELEPHONE (OUTPATIENT)
Dept: SURGERY | Facility: CLINIC | Age: 34
End: 2024-07-17
Payer: COMMERCIAL

## 2024-07-17 NOTE — TELEPHONE ENCOUNTER
Patient is scheduled for surgery with Dr. Egan    Spoke with: Margarita    Date of Surgery: 8/23/2024    Location: [facility]    Informed patient they will need an adult : Yes    Pre op with Provider n/a    H&P: Scheduled with PAC on 7/29/2024 at 11:45am via video    Additional imaging/appointments: n/a    Surgery packet: To be sent via [employer]     Additional comments: n/a        Cassidy Vaca on 7/17/2024 at 12:48 PM

## 2024-07-18 NOTE — TELEPHONE ENCOUNTER
FUTURE VISIT INFORMATION      SURGERY INFORMATION:  Date: 8/23/24  [facility]  Surgeon:  Jared Egan MD   Anesthesia Type:  general  Procedure: HERNIORRHAPHY, UMBILICAL, LAPAROSCOPIC   Consult: ov 7/11/24    RECORDS REQUESTED FROM:       Primary Care Provider: [facility]    Most recent EKG+ Tracing: 3/31/22    Most recent ECHO: 6/28/22

## 2024-07-26 ENCOUNTER — OFFICE VISIT (OUTPATIENT)
Dept: MIDWIFE SERVICES | Facility: CLINIC | Age: 34
End: 2024-07-26
Payer: COMMERCIAL

## 2024-07-26 VITALS
HEART RATE: 81 BPM | OXYGEN SATURATION: 98 % | HEIGHT: 69 IN | SYSTOLIC BLOOD PRESSURE: 108 MMHG | DIASTOLIC BLOOD PRESSURE: 62 MMHG | WEIGHT: 174 LBS | BODY MASS INDEX: 25.77 KG/M2

## 2024-07-26 DIAGNOSIS — Z01.419 WELL WOMAN EXAM: Primary | ICD-10-CM

## 2024-07-26 DIAGNOSIS — K42.9 UMBILICAL HERNIA WITHOUT OBSTRUCTION AND WITHOUT GANGRENE: ICD-10-CM

## 2024-07-26 DIAGNOSIS — Z00.00 HEALTHCARE MAINTENANCE: ICD-10-CM

## 2024-07-26 LAB
ALBUMIN SERPL BCG-MCNC: 4.7 G/DL (ref 3.5–5.2)
ALP SERPL-CCNC: 74 U/L (ref 40–150)
ALT SERPL W P-5'-P-CCNC: 11 U/L (ref 0–50)
ANION GAP SERPL CALCULATED.3IONS-SCNC: 10 MMOL/L (ref 7–15)
AST SERPL W P-5'-P-CCNC: 17 U/L (ref 0–45)
BILIRUB SERPL-MCNC: 0.9 MG/DL
BUN SERPL-MCNC: 8.5 MG/DL (ref 6–20)
CALCIUM SERPL-MCNC: 9.3 MG/DL (ref 8.8–10.4)
CHLORIDE SERPL-SCNC: 102 MMOL/L (ref 98–107)
CHOLEST SERPL-MCNC: 201 MG/DL
CREAT SERPL-MCNC: 0.68 MG/DL (ref 0.51–0.95)
EGFRCR SERPLBLD CKD-EPI 2021: >90 ML/MIN/1.73M2
ERYTHROCYTE [DISTWIDTH] IN BLOOD BY AUTOMATED COUNT: 12.3 % (ref 10–15)
FASTING STATUS PATIENT QL REPORTED: ABNORMAL
FASTING STATUS PATIENT QL REPORTED: NORMAL
GLUCOSE SERPL-MCNC: 88 MG/DL (ref 70–99)
HBA1C MFR BLD: 4.8 % (ref 0–5.6)
HCO3 SERPL-SCNC: 27 MMOL/L (ref 22–29)
HCT VFR BLD AUTO: 42.3 % (ref 35–47)
HDLC SERPL-MCNC: 61 MG/DL
HGB BLD-MCNC: 14.6 G/DL (ref 11.7–15.7)
LDLC SERPL CALC-MCNC: 127 MG/DL
MCH RBC QN AUTO: 29.7 PG (ref 26.5–33)
MCHC RBC AUTO-ENTMCNC: 34.5 G/DL (ref 31.5–36.5)
MCV RBC AUTO: 86 FL (ref 78–100)
NONHDLC SERPL-MCNC: 140 MG/DL
PLATELET # BLD AUTO: 301 10E3/UL (ref 150–450)
POTASSIUM SERPL-SCNC: 4 MMOL/L (ref 3.4–5.3)
PROT SERPL-MCNC: 7.6 G/DL (ref 6.4–8.3)
RBC # BLD AUTO: 4.91 10E6/UL (ref 3.8–5.2)
SODIUM SERPL-SCNC: 139 MMOL/L (ref 135–145)
TRIGL SERPL-MCNC: 63 MG/DL
TSH SERPL DL<=0.005 MIU/L-ACNC: 1.68 UIU/ML (ref 0.3–4.2)
WBC # BLD AUTO: 5.2 10E3/UL (ref 4–11)

## 2024-07-26 PROCEDURE — 99395 PREV VISIT EST AGE 18-39: CPT | Performed by: ADVANCED PRACTICE MIDWIFE

## 2024-07-26 PROCEDURE — 83036 HEMOGLOBIN GLYCOSYLATED A1C: CPT | Performed by: ADVANCED PRACTICE MIDWIFE

## 2024-07-26 PROCEDURE — 80053 COMPREHEN METABOLIC PANEL: CPT | Performed by: ADVANCED PRACTICE MIDWIFE

## 2024-07-26 PROCEDURE — 99459 PELVIC EXAMINATION: CPT | Performed by: ADVANCED PRACTICE MIDWIFE

## 2024-07-26 PROCEDURE — 80061 LIPID PANEL: CPT | Performed by: ADVANCED PRACTICE MIDWIFE

## 2024-07-26 PROCEDURE — 84443 ASSAY THYROID STIM HORMONE: CPT | Performed by: ADVANCED PRACTICE MIDWIFE

## 2024-07-26 PROCEDURE — 36415 COLL VENOUS BLD VENIPUNCTURE: CPT | Performed by: ADVANCED PRACTICE MIDWIFE

## 2024-07-26 PROCEDURE — 85027 COMPLETE CBC AUTOMATED: CPT | Performed by: ADVANCED PRACTICE MIDWIFE

## 2024-07-26 ASSESSMENT — ANXIETY QUESTIONNAIRES
1. FEELING NERVOUS, ANXIOUS, OR ON EDGE: NOT AT ALL
GAD7 TOTAL SCORE: 1
IF YOU CHECKED OFF ANY PROBLEMS ON THIS QUESTIONNAIRE, HOW DIFFICULT HAVE THESE PROBLEMS MADE IT FOR YOU TO DO YOUR WORK, TAKE CARE OF THINGS AT HOME, OR GET ALONG WITH OTHER PEOPLE: NOT DIFFICULT AT ALL
7. FEELING AFRAID AS IF SOMETHING AWFUL MIGHT HAPPEN: NOT AT ALL
7. FEELING AFRAID AS IF SOMETHING AWFUL MIGHT HAPPEN: NOT AT ALL
3. WORRYING TOO MUCH ABOUT DIFFERENT THINGS: NOT AT ALL
2. NOT BEING ABLE TO STOP OR CONTROL WORRYING: NOT AT ALL
5. BEING SO RESTLESS THAT IT IS HARD TO SIT STILL: NOT AT ALL
4. TROUBLE RELAXING: SEVERAL DAYS
6. BECOMING EASILY ANNOYED OR IRRITABLE: NOT AT ALL
8. IF YOU CHECKED OFF ANY PROBLEMS, HOW DIFFICULT HAVE THESE MADE IT FOR YOU TO DO YOUR WORK, TAKE CARE OF THINGS AT HOME, OR GET ALONG WITH OTHER PEOPLE?: NOT DIFFICULT AT ALL
GAD7 TOTAL SCORE: 1

## 2024-07-26 NOTE — PROGRESS NOTES
Assessment:   1.  Well woman exam  2.  Healthcare maintenance  3.  Umbilical hernia scheduled for surgical repair     Plan:      1. Discussed nutrition and exercise.  Advised MVI, Vitamin D3 2000IU geltab and an omega 3 supplement daily   2. Blood tests: FASTING lipid panel, A1c, CBC, CMP and TSH with reflex free T4.    3. Breast self exam technique reviewed and patient encouraged to perform self-exam monthly.   4. Contraception: Status post bilateral tubal ligation  5.  Next pap due 2027. HPV co-testing discussed with that pap, then paps q 5 yrs if both negative.  6.  Scheduled for surgical repair of umbilical hernia August 2024.  7.  RTC 1 year for annual physical exam, PRN    Subjective:   Margarita Roper is a 34 year old female who presents for an annual exam.   Recently seen for surgical consultation for revision/repair of umbilical hernia.  Surgery is scheduled for the end of August.  Contraception: Status post bilateral tubal ligation.    Fasting today for blood work.  Recent cessation of lactation.  Denies breast pain or further leaking of breast milk.  Question regarding healing of perineal laceration after her last birth.  Wondering if anatomy appears normal.  Denies dyspareunia.    Healthy Habits:   Regular Exercise: Yes   Healthy Diet: Yes  Seat Belt: Yes  Sexually active: Yes  STI risk No  domestic violence No    Self Breast Exam Monthly:Yes  Colonoscopy: N/A  Lipid Profile: Yes  Glucose Screen: Yes  Prevention of Osteoporosis: No  Last Dexa: N/A      Immunization History   Administered Date(s) Administered    DTAP (<7y) 1988, 1988, 1988, 06/12/1990    HPV Quadrivalent 04/13/2007, 07/11/2007, 01/08/2009    Historical DTP/aP 1988, 1988, 1988, 06/12/1990    Influenza (IIV3) PF 09/21/2010    Influenza, seasonal, injectable, PF 09/21/2010    MMR 08/21/1989    Meningococcal ACWY (Menactra ) 03/31/2006    Meningococcal ACWY (Menveo ) 03/31/2006     Meningococcal,unspecified 2006    Polio, Unspecified 1988, 1988, 1990    Poliovirus, inactivated (IPV) 1988, 1988, 1990, 2011    TDAP Vaccine (Adacel) 2010    Twinrix A/B 2010, 2011    Typhoid IM 2010     Gynecologic History  Patient's last menstrual period was 2024 (exact date).  Contraception: tubal ligation    Cancer screening:   Last Pap: 2022. Results were: Normal/negative HPV.      OB History    Para Term  AB Living   2 2 2 0 0 2   SAB IAB Ectopic Multiple Live Births   0 0 0 0 2      # Outcome Date GA Lbr Nasim/2nd Weight Sex Type Anes PTL Lv   2 Term 20 39w6d 03:05  02:36 3.6 kg (7 lb 15 oz) M Vag-Spont EPI N RUSH      Complications: Prolonged second stage      Name: Hemanth      Apgar1: 8  Apgar5: 9   1 Term 14 40w1d  3.572 kg (7 lb 14 oz) M Vag-Spont EPI  RUSH      Birth Comments: Long prodromal labor, spontaneous active labor, pushed x 1 hr, tear with repair, BFx 18 months      Name: Thomas      Apgar1: 9  Apgar5: 9       Current Outpatient Medications   Medication Sig Dispense Refill    UNABLE TO FIND MEDICATION NAME: Allergy Choices  Allergy drops       Past Medical History:   Diagnosis Date    Abnormal Pap smear of cervix     Allergic rhinitis     Cervical high risk HPV (human papillomavirus) test positive 2010    Depression     Depressive disorder     History of kidney stones     HPV (human papilloma virus) infection     Intermittent asthma     PVC's (premature ventricular contractions)      Past Surgical History:   Procedure Laterality Date    COLPOSCOPY      early 20's after an abnormal pap smear    EXTRACTION(S) DENTAL      LAPAROTOMY MINI, TUBAL LIGATION (POST PARTUM), COMBINED Bilateral 2022    Procedure: LIGATION, FALLOPIAN TUBE, POSTPARTUM BILATERAL;  Surgeon: Gen Duenas MD;  Location: [facility] OR    ORTHOPEDIC SURGERY      WISDOM TOOTH EXTRACTION        Penicillins, Other drug allergy (see comments), Ampicillin sodium, Corn-containing products, and Lactalbumin  Family History   Problem Relation Age of Onset    Cancer Mother 63        Salivary gland    Hypertension Father     No Known Problems Maternal Grandmother     Unknown/Adopted Maternal Grandfather     Kidney Disease Maternal Grandfather     Cerebrovascular Disease Paternal Grandmother     C.A.D. Paternal Grandfather         MI    Heart Disease Paternal Grandfather     Heart Disease Brother     No Known Problems Son     No Known Problems Maternal Aunt     No Known Problems Maternal Uncle     No Known Problems Paternal Aunt     No Known Problems Paternal Uncle     Diabetes No family hx of     Breast Cancer No family hx of     Cancer - colorectal No family hx of      Social History     Socioeconomic History    Marital status:      Spouse name: Yasmany Morejon    Number of children: 2    Years of education: Not on file    Highest education level: Master's degree (e.g., MA, MS, Kathleen, MEd, MSW, LARISA)   Occupational History    Occupation: Bank Teller     Employer: [email or web address]   Tobacco Use    Smoking status: Former     Current packs/day: 0.30     Average packs/day: 0.3 packs/day for 6.0 years (1.8 ttl pk-yrs)     Types: Cigarettes     Passive exposure: Never    Smokeless tobacco: Never    Tobacco comments:     1-2 cigarettes per week   Vaping Use    Vaping status: Never Used   Substance and Sexual Activity    Alcohol use: Not Currently    Drug use: No    Sexual activity: Yes     Partners: Male     Birth control/protection: Surgical   Other Topics Concern    Parent/sibling w/ CABG, MI or angioplasty before 65F 55M? No   Social History Narrative    Not on file     Social Determinants of Health     Financial Resource Strain: Not on file   Food Insecurity: Not on file   Transportation Needs: Not on file   Physical Activity: Not on file   Stress: Not on file   Social Connections: Not on file   Interpersonal  "Safety: Low Risk  (6/20/2024)    Interpersonal Safety     Do you feel physically and emotionally safe where you currently live?: Yes     Within the past 12 months, have you been hit, slapped, kicked or otherwise physically hurt by someone?: No     Within the past 12 months, have you been humiliated or emotionally abused in other ways by your partner or ex-partner?: No   Housing Stability: Not on file       Review of Systems  General:  Denies problem  Eyes: Denies problem  Ears/Nose/Throat: Denies problem  Cardiovascular: Denies problem  Respiratory:  Denies problem  Gastrointestinal:   Umbilical hernia  Genitourinary: Reports concern about repair of perineal laceration.  Denies dyspareunia with use of coconut oil as lubricant  Musculoskeletal:  Denies problem  Skin: Denies problem  Neurologic:denies problems  Psychiatric: denies problems  Endocrine: denies problems        Objective:      Vitals:    07/26/24 1021   BP: 108/62   Pulse: 81   SpO2: 98%   Weight: 78.9 kg (174 lb)   Height: 1.74 m (5' 8.5\")       Physical Exam:  General Appearance: Alert, cooperative, no distress, appears stated age  Skin: Skin color, texture, turgor normal, no rashes or lesions  Throat: Lips, mucosa, and tongue normal; teeth and gums normal  HEENT: grossly normal; otoscopic and opthalmic exam not performed.   Neck: Supple, symmetrical, trachea midline, no adenopathy;  thyroid: not enlarged, symmetric, no tenderness/mass/nodules  Lungs: Clear to auscultation bilaterally, respirations unlabored  Breasts: Declines clinical breast exam   Heart: Regular rate and rhythm, S1 and S2 normal, no murmur  Abdomen: Soft, non-tender. No organomegaly or masses  Pelvic:External genitalia normal without lesions or irritation.  Perineal laceration appears well-healed with redundant perineal skin on maternal perineum, left side, minimal without buttonhole or granulation tissue.        Answers submitted by the patient for this visit:  ANOOP-7 (Submitted on " 7/26/2024)  ANOOP 7 TOTAL SCORE: 1

## 2024-07-29 ENCOUNTER — PRE VISIT (OUTPATIENT)
Dept: SURGERY | Facility: CLINIC | Age: 34
End: 2024-07-29

## 2024-07-29 ENCOUNTER — ANESTHESIA EVENT (OUTPATIENT)
Dept: SURGERY | Facility: CLINIC | Age: 34
End: 2024-07-29

## 2024-07-29 ENCOUNTER — VIRTUAL VISIT (OUTPATIENT)
Dept: SURGERY | Facility: CLINIC | Age: 34
End: 2024-07-29
Payer: COMMERCIAL

## 2024-07-29 VITALS — WEIGHT: 174 LBS | HEIGHT: 69 IN | BODY MASS INDEX: 25.77 KG/M2

## 2024-07-29 DIAGNOSIS — Z01.818 PRE-OP EVALUATION: Primary | ICD-10-CM

## 2024-07-29 DIAGNOSIS — K42.9 UMBILICAL HERNIA WITHOUT OBSTRUCTION AND WITHOUT GANGRENE: ICD-10-CM

## 2024-07-29 PROCEDURE — 99202 OFFICE O/P NEW SF 15 MIN: CPT | Mod: 95 | Performed by: PHYSICIAN ASSISTANT

## 2024-07-29 ASSESSMENT — PAIN SCALES - GENERAL: PAINLEVEL: NO PAIN (0)

## 2024-07-29 ASSESSMENT — ENCOUNTER SYMPTOMS: SEIZURES: 0

## 2024-07-29 ASSESSMENT — LIFESTYLE VARIABLES: TOBACCO_USE: 0

## 2024-07-29 NOTE — H&P
Pre-Operative H & P     CC:  Preoperative exam to assess for increased cardiopulmonary risk while undergoing surgery and anesthesia.    Date of Encounter: 7/29/2024  Primary Care Physician:  P[facility]s, [place]     Reason for visit:   Encounter Diagnoses   Name Primary?    Pre-op evaluation Yes    Umbilical hernia without obstruction and without gangrene        HPI  Margarita B Jacquelin is a 34 year old female who presents for pre-operative H & P in preparation for  Procedure Information       Case: [id number] Date/Time: 08/23/24 1150    Procedure: HERNIORRHAPHY, UMBILICAL, LAPAROSCOPIC (Abdomen)    Anesthesia type: General    Diagnosis: Umbilical hernia without obstruction and without gangrene [K42.9]    Pre-op diagnosis: Umbilical hernia without obstruction and without gangrene [K42.9]    Location: [place] [address] / [facility]    Providers: Jared Egan MD            Patient is being evaluated for comorbid conditions of intermittent asthma, depression, h/o kidney stones     Ms. Roper has an 8 year history of a lump at her umbilicus. She was recently evaluated by Dr. Egan and is now scheduled for surgery as above.     History is obtained from the patient and chart review    Hx of abnormal bleeding or anti-platelet use: denies    Menstrual history: Patient's last menstrual period was 07/16/2024 (exact date).     Past Medical History  Past Medical History:   Diagnosis Date    Abnormal Pap smear of cervix     Allergic rhinitis     Cervical high risk HPV (human papillomavirus) test positive 05/18/2010    Depression     Depressive disorder     History of kidney stones     HPV (human papilloma virus) infection     Intermittent asthma     PVC's (premature ventricular contractions)        Past Surgical History  Past Surgical History:   Procedure Laterality Date    COLPOSCOPY      early 20's after an abnormal pap smear    EXTRACTION(S) DENTAL      LAPAROTOMY MINI,  TUBAL LIGATION (POST PARTUM), COMBINED Bilateral 7/13/2022    Procedure: LIGATION, FALLOPIAN TUBE, POSTPARTUM BILATERAL;  Surgeon: Gen Duenas MD;  Location: [facility] Main OR    ORTHOPEDIC SURGERY      WISDOM TOOTH EXTRACTION         Prior to Admission Medications  Current Outpatient Medications   Medication Sig Dispense Refill    UNABLE TO FIND Take 2 drops by mouth 3 times daily MEDICATION NAME: Allergy Choices  Allergy drops         Allergies  Allergies   Allergen Reactions    Penicillins Rash and Hives    Other Drug Allergy (See Comments)      Takes sublingual inhalant antigen for allergies    Ampicillin Sodium Unknown    Corn-Containing Products GI Disturbance    Lactalbumin Other (See Comments)     Bloated, constipated       Social History  Social History     Socioeconomic History    Marital status:      Spouse name: Yasmany Morejon    Number of children: 2    Years of education: Not on file    Highest education level: Master's degree (e.g., MA, MS, Kathleen, MEd, MSW, LARISA)   Occupational History    Occupation: Bank Teller     Employer: [employer]   Tobacco Use    Smoking status: Former     Current packs/day: 0.30     Average packs/day: 0.3 packs/day for 6.0 years (1.8 ttl pk-yrs)     Types: Cigarettes     Passive exposure: Never    Smokeless tobacco: Never   Vaping Use    Vaping status: Never Used   Substance and Sexual Activity    Alcohol use: Not Currently    Drug use: No    Sexual activity: Yes     Partners: Male     Birth control/protection: Surgical   Other Topics Concern    Parent/sibling w/ CABG, MI or angioplasty before 65F 55M? No   Social History Narrative    Not on file     Social Determinants of Health     Financial Resource Strain: Not on file   Food Insecurity: Not on file   Transportation Needs: Not on file   Physical Activity: Not on file   Stress: Not on file   Social Connections: Not on file   Interpersonal Safety: Low Risk  (6/20/2024)    Interpersonal Safety     Do you  feel physically and emotionally safe where you currently live?: Yes     Within the past 12 months, have you been hit, slapped, kicked or otherwise physically hurt by someone?: No     Within the past 12 months, have you been humiliated or emotionally abused in other ways by your partner or ex-partner?: No   Housing Stability: Not on file       Family History  Family History   Problem Relation Age of Onset    Cancer Mother 63        Salivary gland    Hypertension Father     Heart Disease Brother     No Known Problems Maternal Grandmother     Unknown/Adopted Maternal Grandfather     Kidney Disease Maternal Grandfather     Cerebrovascular Disease Paternal Grandmother     C.A.D. Paternal Grandfather         MI    Heart Disease Paternal Grandfather     No Known Problems Son     No Known Problems Maternal Aunt     No Known Problems Maternal Uncle     No Known Problems Paternal Aunt     Hypotension Paternal Uncle     Diabetes No family hx of     Breast Cancer No family hx of     Cancer - colorectal No family hx of     Anesthesia Reaction No family hx of     Deep Vein Thrombosis (DVT) No family hx of        Review of Systems  The complete review of systems is negative other than noted in the HPI or here.   Anesthesia Evaluation   Pt has had prior anesthetic.     No history of anesthetic complications       ROS/MED HX  ENT/Pulmonary:     (+)     GET risk factors, snores loudly,     allergic rhinitis,          Intermittent, asthma               (-) tobacco use   Neurologic:  - neg neurologic ROS  (-) no seizures and no CVA   Cardiovascular: Comment: Palpitations     (+)  - -   -  - -                                 Previous cardiac testing   Echo: Date: 2022 Results:  Interpretation Summary     Technically difficult study. No contrast use. Patient is pregnant.     The left ventricle is normal in structure, function and size.  The right ventricle is normal in size and function.  No hemodynamically significant valvular  disease.  There is no pericardial effusion.  IVC not well visualized.    Stress Test:  Date: Results:    ECG Reviewed:  Date: 2022 Results:  NSR  Cath:  Date: Results:   (-) taking anticoagulants/antiplatelets   METS/Exercise Tolerance: >4 METS Comment: Yoga daily, walks around ehealthtracker, runner    Hematologic:  - neg hematologic  ROS  (-) history of blood clots and history of blood transfusion   Musculoskeletal:  - neg musculoskeletal ROS     GI/Hepatic:  - neg GI/hepatic ROS  (-) GERD   Renal/Genitourinary:  - neg Renal ROS     Endo:  - neg endo ROS  (-) chronic steroid usage   Psychiatric/Substance Use:     (+) psychiatric history depression       Infectious Disease:  - neg infectious disease ROS     Malignancy:  - neg malignancy ROS     Other:            Virtual visit -  No vitals were obtained    Physical Exam  Constitutional: Awake, alert, cooperative, no apparent distress, and appears stated age.  HENT: Normocephalic  Respiratory: non labored breathing   Neurologic: Awake, alert, oriented to name, place and time.   Neuropsychiatric: Calm, cooperative. Normal affect.      Prior Labs/Diagnostic Studies   All labs and imaging personally reviewed     EKG/ stress test - if available please see in ROS above   Echo result w/o MOPS: Interpretation Summary Technically difficult study. No contrast use. Patient is pregnant. The left ventricle is normal in structure, function and size.The right ventricle is normal in size and function.No hemodynamically significant valvular disease.There is no pericardial effusion.IVC not well visualized.           No data to display                  The patient's records and results personally reviewed by this provider.     Outside records reviewed from: Care Everywhere      Assessment    Margarita Roper is a 34 year old female seen as a PAC referral for risk assessment and optimization for anesthesia.    Plan/Recommendations  Pt will be optimized for the proposed procedure.  See below  "for details on the assessment, risk, and preoperative recommendations    NEUROLOGY  - No history of TIA, CVA or seizure  -Post Op delirium risk factors:  No risk identified    ENT  - No current airway concerns.  Will need to be reassessed day of surgery.  Mallampati: Unable to assess  TM: Unable to assess    CARDIAC  - No history of CAD, Hypertension, and Afib  -Denies cardiac history or symptoms  - METS (Metabolic Equivalents)  Patient performs 4 or more METS exercise without symptoms             Total Score: 0      RCRI-Very low risk: Class 1 0.4% complication rate             Total Score: 0        PULMONARY  GET Low Risk             Total Score: 1    GET: Snores loudly      - Asthma  Intermittent. Well controlled. No recent need for inhaler use   - Tobacco History    History   Smoking Status    Former    Types: Cigarettes   Smokeless Tobacco    Never       GI  PONV High Risk  Total Score: 3           1 AN PONV: Pt is Female    1 AN PONV: Patient is not a current smoker    1 AN PONV: Intended Post Op Opioids        /RENAL  - Baseline Creatinine WNL    ENDOCRINE    - BMI: Estimated body mass index is 26.07 kg/m  as calculated from the following:    Height as of this encounter: 1.74 m (5' 8.5\").    Weight as of this encounter: 78.9 kg (174 lb).  Overweight (BMI 25.0-29.9)  - No history of Diabetes Mellitus    HEME  VTE Low Risk 0.26%             Total Score: 0        MSK  Patient is NOT Frail             Total Score: 0      -PM&R referral not indicated     Different anesthesia methods/types have been discussed with the patient, but they are aware that the final plan will be decided by the assigned anesthesia provider on the date of service.    The patient is optimized for their procedure. AVS with information on surgery time/arrival time, meds and NPO status given by nursing staff. No further diagnostic testing indicated.    Please refer to the physical examination documented by the anesthesiologist in the " anesthesia record on the day of surgery.    Video-Visit Details    Type of service:  Video Visit    Provider received verbal consent for a Video Visit from the patient? Yes     Originating Location (pt. Location): Home    Distant Location (provider location):  Off-site  Mode of Communication:  Video Conference via proVITAL  On the day of service:     Prep time: 6 minutes  Visit time: 9 minutes  Documentation time: 3 minutes  ------------------------------------------  Total time: 18 minutes      Awilda Alvarado PA-C  Preoperative Assessment Center  [facility]  Clinic and Surgery Center  Phone: [phone]  Fax: [phone]

## 2024-07-29 NOTE — PROGRESS NOTES
Margarita is a 34 year old who is being evaluated via a billable video visit.    How would you like to obtain your AVS? MyChart  If the video visit is dropped, the invitation should be resent by: Text to cell phone: [phone]    Subjective   Margarita is a 34 year old, presenting for the following health issues:  Pre-Op Exam    HPI         Physical Exam

## 2024-07-29 NOTE — PATIENT INSTRUCTIONS
Preparing for Your Surgery      Name:  Margarita Roper   MRN:  [id number]   :  1990   Today's Date:  2024       Arriving for surgery:  Surgery date:  24  Arrival time:  10:20 am      Please come to:     [facility] [address] [address]     Parking is available in front of the Clinics and Surgery Center building from 5:30AM to 8:00PM.  -  Proceed to the 5th floor to check into the Ambulatory Surgery Center.              >> There will be patient concierges on the 1st and 5th floor, for assistance or an escort, if you would like.              >> Please call [phone] with any questions.    What can I eat or drink?  -  You may eat and drink normally up to 8 hours prior to arrival time.   -  You may have clear liquids until 2 hours prior to arrival time.     Examples of clear liquids:  Water  Clear broth  Juices (apple, white grape, white cranberry  and cider) without pulp  Noncarbonated, powder based beverages  (lemonade and Guerrero-Aid)  Sodas (Sprite, 7-Up, ginger ale and seltzer)  Coffee or tea (without milk or cream)  Gatorade    -  No Alcohol or cannabis products for at least 24 hours before surgery.     Which medicines can I take?    Hold Aspirin for 7 days before surgery.   Hold Multivitamins for 7 days before surgery.  Hold Supplements for 7 days before surgery.  Hold Ibuprofen (Advil, Motrin) for 1 day(s) before surgery--unless otherwise directed by surgeon.  Hold Naproxen (Aleve) for 4 days before surgery.    -  PLEASE TAKE these medications the day of surgery:  Tylenol if needed.    How do I prepare myself?  - Please take 2 showers (one the night prior to surgery and one the morning of surgery) using Scrubcare or Hibiclens soap.    Use this soap only from the neck to your toes.     Leave the soap on your skin for one minute--then rinse thoroughly.      You may use your own shampoo and conditioner. No other hair  products.   - Please remove all jewelry and body piercings.  - No lotions, deodorants or fragrance.  - No makeup or fingernail polish.   - Bring your ID and insurance card.    -If you use a CPAP machine, please bring the CPAP machine, tubing, and mask to hospital.    -If you have a Deep Brain Stimulator, Spinal Cord Stimulator, or any Neuro Stimulator device---you must bring the remote control to the hospital.      ALL PATIENTS GOING HOME THE SAME DAY OF SURGERY ARE REQUIRED TO HAVE A RESPONSIBLE ADULT TO DRIVE AND BE IN ATTENDANCE WITH THEM FOR 24 HOURS FOLLOWING SURGERY.    Covid testing policy as of 12/06/2022  Your surgeon will notify and schedule you for a COVID test if one is needed before surgery--please direct any questions or COVID symptoms to your surgeon      Questions or Concerns:    - For any questions regarding the day of surgery or your hospital stay, please contact the Pre Admission Nursing Office at [phone].       - If you have health changes between today and your surgery, please call your surgeon.       - For questions after surgery, please call your surgeons office.           Current Visitor Guidelines    You may have 2 visitors in the pre op area.    Visiting hours: 8 a.m. to 8:30 p.m.    Patients confirmed or suspected to have symptoms of COVID 19 or flu:     No visitors allowed for adult patients.   Children (under age 18) can have 1 named visitor.     People who are sick or showing symptoms of COVID 19 or flu:    Are not allowed to visit patients--we can only make exceptions in special situations.       Please follow these guidelines for your visit:          Please maintain social distance          Masking is optional--however at times you may be asked to wear a mask for the safety of yourself and others     Clean your hands with alcohol hand . Do this when you arrive at and leave the building and patient room,    And again after you touch your mask or anything in the room.      Go directly to and from the room you are visiting.     Stay in the patient s room during your visit. Limit going to other places in the hospital as much as possible     Leave bags and jackets at home or in the car.     For everyone s health, please don t come and go during your visit. That includes for smoking   during your visit.

## 2024-08-01 ENCOUNTER — TELEPHONE (OUTPATIENT)
Dept: SURGERY | Facility: CLINIC | Age: 34
End: 2024-08-01
Payer: COMMERCIAL

## 2024-08-01 NOTE — TELEPHONE ENCOUNTER
Patient called to cancel her umbilical hernia repair, will call back to reschedule at a later date.

## 2024-08-15 DIAGNOSIS — R42 DIZZY SPELLS: Primary | ICD-10-CM

## 2024-08-15 DIAGNOSIS — I95.9 HYPOTENSION, UNSPECIFIED HYPOTENSION TYPE: ICD-10-CM

## 2024-08-15 DIAGNOSIS — R00.0 TACHYCARDIA: ICD-10-CM

## 2024-08-22 NOTE — CONFIDENTIAL NOTE
Reason for visit: Dizzy spells, Tachycardia, Hypotension, unspecified hypotension type      Referring Provider: Nisha Keita CNM      Office Visit Notes: 07/26/2024         IMAGING   STATUS/LOCATION   DATE/TYPE     MRI N/A      CT N/A      XRAY       EEG       EMG       NEUOROPSYCH TEST:       NOTES:   STATUS/LOCATION   DATE/TYPE

## 2024-09-26 ENCOUNTER — PRE VISIT (OUTPATIENT)
Dept: NEUROLOGY | Facility: CLINIC | Age: 34
End: 2024-09-26

## 2024-09-26 ENCOUNTER — OFFICE VISIT (OUTPATIENT)
Dept: NEUROLOGY | Facility: CLINIC | Age: 34
End: 2024-09-26
Attending: MIDWIFE
Payer: COMMERCIAL

## 2024-09-26 VITALS
DIASTOLIC BLOOD PRESSURE: 73 MMHG | WEIGHT: 180.1 LBS | BODY MASS INDEX: 26.67 KG/M2 | SYSTOLIC BLOOD PRESSURE: 109 MMHG | HEART RATE: 85 BPM | HEIGHT: 69 IN | OXYGEN SATURATION: 99 %

## 2024-09-26 DIAGNOSIS — R42 DIZZY SPELLS: ICD-10-CM

## 2024-09-26 DIAGNOSIS — R00.0 TACHYCARDIA: ICD-10-CM

## 2024-09-26 DIAGNOSIS — I95.9 HYPOTENSION, UNSPECIFIED HYPOTENSION TYPE: ICD-10-CM

## 2024-09-26 PROCEDURE — 99205 OFFICE O/P NEW HI 60 MIN: CPT | Performed by: PSYCHIATRY & NEUROLOGY

## 2024-09-26 NOTE — LETTER
2024      Margarita Roper  [address] [facility] [address]      Dear Colleague,    Thank you for referring your patient, Margarita Roper, to the [facility]. Please see a copy of my visit note below.      Neurology Consultation    Patient Name:  Margarita Roper  MRN:  [id number]    :  1990  Date of Service:  2024  Primary care provider:  P[facility]s, [facility]        Chief Complaint: Dizziness     History of Present Illness:     Margarita Roper is a 34 year old year old female who presents for evaluation of dizziness.  She is concerned about POTs. Started having episodes of dizziness that started about 8 years ago 2 months after her son was born.  The dizziness is described as both lightheadedness and sense of internal and external spin. The symptoms are persistent during these episodes. She feels like she is going to pass out but has never had a syncopal event. Has associated nausea and maybe photophobia.  Denies headache during this time.  They can last up to 48 hours; less if she is able to rest.  Sleep improves symptoms.  Worsened with movement, no particular head movements that provoke it.  Does seem to be worse with positional changes.  She has had 3 episodes since last September.  Interestingly occurs typically on the second day of her menstrual cycle. Denies heavy menstrual cycles and does not have anemia.  She is asymptomatic in between episodes and denies having dizziness at other times. Might be provoked by stress. Denies Tullio's phenomenon.     Was having headaches but have largely resolved with making changes with posture/stretching.  They were located in the right occiput radiates into right side of neck. Denies migrainous features including photophobia, phonophobia, nausea, and vomiting.      She started working with someone who does Chinese medicine and thinks maybe it has helped.  She would like to do this for a year before  "trying something different.      Saw a cardiologist within the last 3 years     Past Medical History:  Past Medical History:   Diagnosis Date     Abnormal Pap smear of cervix      Allergic rhinitis      Cervical high risk HPV (human papillomavirus) test positive 05/18/2010     Depression      Depressive disorder      History of kidney stones      HPV (human papilloma virus) infection      Intermittent asthma      PVC's (premature ventricular contractions)        Social History:  -  Teacher, 2 children. Denies tobacco use, recreational drug use, or EtOH use     Allergies:  Allergies   Allergen Reactions     Penicillins Rash and Hives     Other Drug Allergy (See Comments)      Takes sublingual inhalant antigen for allergies     Ampicillin Sodium Unknown     Corn-Containing Products GI Disturbance     Lactalbumin Other (See Comments)     Bloated, constipated       Physical Exam:  /73   Pulse 85   Ht 1.74 m (5' 8.5\")   Wt 81.7 kg (180 lb 1.6 oz)   SpO2 99%   BMI 26.99 kg/m      General:  No acute distress  Cardiovascular: Normal rate.  Lung: Respirations are non-labored.  Extremities: Normal range of motion  Integumentary: Warm and dry    Neurologic:  Mental status : alert, fund of knowledge appropriate for visit    LANGUAGE: Speech fluent, no dysarthria     CN:  II- pupils equal and reactive, visual fields full  III, IV, VI- extraocular movements intact  V- sensation intact bilaterally  VII- face symmetric  VIII- hearing intact, no nystagmus  IX, X- palate elevates symmetrically  XI- sternocleidomastoid 5/5  XII- tongue midline    MOTOR : intact bulk and tone  5/5 strength in all ext    SENSORY : intact to pp, temp, and vibration in BUE and BLE     REFLEXES:       Right Left   Triceps 2+ 2+   Biceps 2+ 2+   Brachioradialis 2+ 2+   Knee jerk 2+ 2+   Ankle jerk 2+ 2+       CEREBELLUM: finger to nose, finger taps, and heel to shin intact bilaterally     GAIT : normal ; able to do tandem gait     Cognition and " Speech: Speech clear and coherent.    Psychiatric: Cooperative, Appropriate mood & affect.      Assessment/Plan:   Margarita Roper is a 34 year old year old female who presents for evaluation of dizziness.  Neurologic exam is largely normal.  Her presentation is less concerning for POTs and wonder more about vestibular migraines. The relationship to her menstrual cycle is suspicious for this being migraine related.  Also the fact that her symptoms do not occur in between episodes would be unusual for POTs. She has cardiologist and wants to discuss with them  possibility of POTs, which I think is fine.  She had only 3 episodes in a year and don't think she needs a preventive.  An abortive option is difficult in this situation as she does not actually have a headache associated but could consider using meclazine and Nurtec in a preventative way like what we do for menstrual migraines.  She would like to continue to work with her chinese medicine specialist.  I did not arrange for a follow-up appointment  but remain available for questions and concerns.    Possible vestibular migraines     Follow-up PRN    I spent 61 minutes providing care for this patient, including reviewing imaging, labs, and notes as well as providing counseling and coordination of care for the above issues.      Again, thank you for allowing me to participate in the care of your patient.        Sincerely,        Breanna Johnson, DO

## 2024-09-26 NOTE — PROGRESS NOTES
Neurology Consultation    Patient Name:  Margarita Roper  MRN:  [id number]    :  1990  Date of Service:  2024  Primary care provider:  Pediatrics, [facility]        Chief Complaint: Dizziness     History of Present Illness:     Margarita Roper is a 34 year old year old female who presents for evaluation of dizziness.  She is concerned about POTs. Started having episodes of dizziness that started about 8 years ago 2 months after her son was born.  The dizziness is described as both lightheadedness and sense of internal and external spin. The symptoms are persistent during these episodes. She feels like she is going to pass out but has never had a syncopal event. Has associated nausea and maybe photophobia.  Denies headache during this time.  They can last up to 48 hours; less if she is able to rest.  Sleep improves symptoms.  Worsened with movement, no particular head movements that provoke it.  Does seem to be worse with positional changes.  She has had 3 episodes since last September.  Interestingly occurs typically on the second day of her menstrual cycle. Denies heavy menstrual cycles and does not have anemia.  She is asymptomatic in between episodes and denies having dizziness at other times. Might be provoked by stress. Denies Tullio's phenomenon.     Was having headaches but have largely resolved with making changes with posture/stretching.  They were located in the right occiput radiates into right side of neck. Denies migrainous features including photophobia, phonophobia, nausea, and vomiting.      She started working with someone who does Chinese medicine and thinks maybe it has helped.  She would like to do this for a year before trying something different.      Saw a cardiologist within the last 3 years     Past Medical History:  Past Medical History:   Diagnosis Date    Abnormal Pap smear of cervix     Allergic rhinitis     Cervical high risk HPV (human papillomavirus) test  "positive 05/18/2010    Depression     Depressive disorder     History of kidney stones     HPV (human papilloma virus) infection     Intermittent asthma     PVC's (premature ventricular contractions)        Social History:  -  Teacher, 2 children. Denies tobacco use, recreational drug use, or EtOH use     Allergies:  Allergies   Allergen Reactions    Penicillins Rash and Hives    Other Drug Allergy (See Comments)      Takes sublingual inhalant antigen for allergies    Ampicillin Sodium Unknown    Corn-Containing Products GI Disturbance    Lactalbumin Other (See Comments)     Bloated, constipated       Physical Exam:  /73   Pulse 85   Ht 1.74 m (5' 8.5\")   Wt 81.7 kg (180 lb 1.6 oz)   SpO2 99%   BMI 26.99 kg/m      General:  No acute distress  Cardiovascular: Normal rate.  Lung: Respirations are non-labored.  Extremities: Normal range of motion  Integumentary: Warm and dry    Neurologic:  Mental status : alert, fund of knowledge appropriate for visit    LANGUAGE: Speech fluent, no dysarthria     CN:  II- pupils equal and reactive, visual fields full  III, IV, VI- extraocular movements intact  V- sensation intact bilaterally  VII- face symmetric  VIII- hearing intact, no nystagmus  IX, X- palate elevates symmetrically  XI- sternocleidomastoid 5/5  XII- tongue midline    MOTOR : intact bulk and tone  5/5 strength in all ext    SENSORY : intact to pp, temp, and vibration in BUE and BLE     REFLEXES:       Right Left   Triceps 2+ 2+   Biceps 2+ 2+   Brachioradialis 2+ 2+   Knee jerk 2+ 2+   Ankle jerk 2+ 2+       CEREBELLUM: finger to nose, finger taps, and heel to shin intact bilaterally     GAIT : normal ; able to do tandem gait     Cognition and Speech: Speech clear and coherent.    Psychiatric: Cooperative, Appropriate mood & affect.      Assessment/Plan:   Margarita Roper is a 34 year old year old female who presents for evaluation of dizziness.  Neurologic exam is largely normal.  Her presentation is " less concerning for POTs and wonder more about vestibular migraines. The relationship to her menstrual cycle is suspicious for this being migraine related.  Also the fact that her symptoms do not occur in between episodes would be unusual for POTs. She has cardiologist and wants to discuss with them  possibility of POTs, which I think is fine.  She had only 3 episodes in a year and don't think she needs a preventive.  An abortive option is difficult in this situation as she does not actually have a headache associated but could consider using meclazine and Nurtec in a preventative way like what we do for menstrual migraines.  She would like to continue to work with her chinese medicine specialist.  I did not arrange for a follow-up appointment  but remain available for questions and concerns.    Possible vestibular migraines     Follow-up PRN    I spent 61 minutes providing care for this patient, including reviewing imaging, labs, and notes as well as providing counseling and coordination of care for the above issues.

## 2024-10-18 DIAGNOSIS — K42.9 UMBILICAL HERNIA WITHOUT OBSTRUCTION AND WITHOUT GANGRENE: Primary | ICD-10-CM

## 2024-10-18 RX ORDER — CEFAZOLIN SODIUM 2 G/50ML
2 SOLUTION INTRAVENOUS
OUTPATIENT
Start: 2024-10-18

## 2024-10-18 RX ORDER — CEFAZOLIN SODIUM 2 G/50ML
2 SOLUTION INTRAVENOUS SEE ADMIN INSTRUCTIONS
OUTPATIENT
Start: 2024-10-18

## 2024-12-05 NOTE — TELEPHONE ENCOUNTER
FUTURE VISIT INFORMATION      SURGERY INFORMATION:  Date: 3/12/25  [facility]: [facility]  Surgeon:  Jared Egan MD   Anesthesia Type:  general  Procedure: HERNIORRHAPHY, UMBILICAL, LAPAROSCOPIC   Consult: ov 7/11/24     RECORDS REQUESTED FROM:         Primary Care Provider: [facility]     Most recent EKG+ Tracing: 3/31/22     Most recent ECHO: 6/28/22

## 2025-02-26 ENCOUNTER — PRE VISIT (OUTPATIENT)
Dept: SURGERY | Facility: CLINIC | Age: 35
End: 2025-02-26

## (undated) DEVICE — PLATE GROUNDING ADULT W/CORD 9165L

## (undated) DEVICE — SOL ADH LIQUID BENZOIN SWAB 0.6ML C1544

## (undated) DEVICE — ESU PENCIL SMOKE EVAC W/ROCKER SWITCH 0703-047-000

## (undated) DEVICE — SUTURE VICRYL+ 0 27IN CT-1 UND VCP260H

## (undated) DEVICE — SUTURE VICRYL+ 4-0 UNDYED PS-2 VCP496H

## (undated) DEVICE — DRSG GAUZE 4X4" 3033

## (undated) DEVICE — BLADE KNIFE SURG 15 371115

## (undated) DEVICE — SU VICRYL+ 0 27IN CT-2 VLT VCP334H

## (undated) DEVICE — DRSG PRIMAPORE 02X3" 7133

## (undated) DEVICE — CUSTOM PACK GEN MAJOR SBA5BGMHEA

## (undated) DEVICE — DRSG STERI STRIP 1/2X4" R1547

## (undated) DEVICE — DRAPE OR LAPAROTOMY KC 89228*

## (undated) DEVICE — SU PLAIN 0 TIE 54" S104H

## (undated) DEVICE — GLOVE BIOGEL PI ULTRATOUCH G SZ 8.0 42180

## (undated) DEVICE — SUCTION MANIFOLD NEPTUNE 2 SYS 1 PORT 702-025-000

## (undated) DEVICE — PREP CHLORAPREP 26ML TINTED HI-LITE ORANGE 930815

## (undated) DEVICE — SOL NACL 0.9% IRRIG 1000ML BOTTLE 2F7124

## (undated) RX ORDER — FENTANYL CITRATE 50 UG/ML
INJECTION, SOLUTION INTRAMUSCULAR; INTRAVENOUS
Status: DISPENSED
Start: 2022-07-13